# Patient Record
Sex: FEMALE | Race: WHITE | NOT HISPANIC OR LATINO | Employment: UNEMPLOYED | URBAN - METROPOLITAN AREA
[De-identification: names, ages, dates, MRNs, and addresses within clinical notes are randomized per-mention and may not be internally consistent; named-entity substitution may affect disease eponyms.]

---

## 2024-01-01 ENCOUNTER — APPOINTMENT (OUTPATIENT)
Dept: RADIOLOGY | Facility: HOSPITAL | Age: 44
DRG: 871 | End: 2024-01-01
Payer: COMMERCIAL

## 2024-01-01 ENCOUNTER — APPOINTMENT (OUTPATIENT)
Dept: PRIMARY CARE | Facility: CLINIC | Age: 44
End: 2024-01-01
Payer: COMMERCIAL

## 2024-01-01 ENCOUNTER — APPOINTMENT (OUTPATIENT)
Dept: CARDIOLOGY | Facility: HOSPITAL | Age: 44
DRG: 871 | End: 2024-01-01
Payer: COMMERCIAL

## 2024-01-01 ENCOUNTER — HOSPITAL ENCOUNTER (INPATIENT)
Facility: HOSPITAL | Age: 44
LOS: 4 days | DRG: 871 | End: 2024-04-13
Attending: EMERGENCY MEDICINE | Admitting: INTERNAL MEDICINE
Payer: COMMERCIAL

## 2024-01-01 VITALS
OXYGEN SATURATION: 97 % | WEIGHT: 170.42 LBS | DIASTOLIC BLOOD PRESSURE: 66 MMHG | HEIGHT: 65 IN | SYSTOLIC BLOOD PRESSURE: 129 MMHG | TEMPERATURE: 95.9 F | BODY MASS INDEX: 28.39 KG/M2

## 2024-01-01 DIAGNOSIS — E16.2 HYPOGLYCEMIA: Primary | ICD-10-CM

## 2024-01-01 DIAGNOSIS — K92.2 GASTROINTESTINAL HEMORRHAGE, UNSPECIFIED GASTROINTESTINAL HEMORRHAGE TYPE: ICD-10-CM

## 2024-01-01 DIAGNOSIS — J96.01 ACUTE HYPOXIC RESPIRATORY FAILURE (MULTI): ICD-10-CM

## 2024-01-01 DIAGNOSIS — K72.90 DECOMPENSATION OF CIRRHOSIS OF LIVER (MULTI): ICD-10-CM

## 2024-01-01 DIAGNOSIS — K74.60 DECOMPENSATION OF CIRRHOSIS OF LIVER (MULTI): ICD-10-CM

## 2024-01-01 DIAGNOSIS — E03.9 HYPOTHYROIDISM, UNSPECIFIED TYPE: ICD-10-CM

## 2024-01-01 DIAGNOSIS — N17.9 ACUTE KIDNEY INJURY (CMS-HCC): ICD-10-CM

## 2024-01-01 LAB
ABO GROUP (TYPE) IN BLOOD: NORMAL
ABO GROUP (TYPE) IN BLOOD: NORMAL
ACANTHOCYTES BLD QL SMEAR: ABNORMAL
ALBUMIN FLD-MCNC: <0.5 G/DL
ALBUMIN SERPL BCP-MCNC: 1.5 G/DL (ref 3.4–5)
ALBUMIN SERPL BCP-MCNC: 1.7 G/DL (ref 3.4–5)
ALBUMIN SERPL BCP-MCNC: 2.2 G/DL (ref 3.4–5)
ALBUMIN SERPL BCP-MCNC: 2.4 G/DL (ref 3.4–5)
ALBUMIN SERPL BCP-MCNC: 2.4 G/DL (ref 3.4–5)
ALBUMIN SERPL BCP-MCNC: 2.5 G/DL (ref 3.4–5)
ALBUMIN SERPL BCP-MCNC: 2.6 G/DL (ref 3.4–5)
ALBUMIN SERPL BCP-MCNC: 2.7 G/DL (ref 3.4–5)
ALBUMIN SERPL BCP-MCNC: 2.7 G/DL (ref 3.4–5)
ALBUMIN SERPL BCP-MCNC: 3.1 G/DL (ref 3.4–5)
ALP SERPL-CCNC: 124 U/L (ref 33–110)
ALP SERPL-CCNC: 130 U/L (ref 33–110)
ALP SERPL-CCNC: 72 U/L (ref 33–110)
ALP SERPL-CCNC: 81 U/L (ref 33–110)
ALP SERPL-CCNC: 87 U/L (ref 33–110)
ALT SERPL W P-5'-P-CCNC: 13 U/L (ref 7–45)
ALT SERPL W P-5'-P-CCNC: 14 U/L (ref 7–45)
ALT SERPL W P-5'-P-CCNC: 16 U/L (ref 7–45)
ALT SERPL W P-5'-P-CCNC: 19 U/L (ref 7–45)
ALT SERPL W P-5'-P-CCNC: 21 U/L (ref 7–45)
AMMONIA PLAS-SCNC: 115 UMOL/L (ref 16–53)
AMPHETAMINES UR QL SCN: ABNORMAL
ANION GAP BLDA CALCULATED.4IONS-SCNC: 14 MMO/L (ref 10–25)
ANION GAP BLDA CALCULATED.4IONS-SCNC: 14 MMO/L (ref 10–25)
ANION GAP BLDA CALCULATED.4IONS-SCNC: 15 MMO/L (ref 10–25)
ANION GAP BLDA CALCULATED.4IONS-SCNC: 16 MMO/L (ref 10–25)
ANION GAP BLDA CALCULATED.4IONS-SCNC: 16 MMO/L (ref 10–25)
ANION GAP BLDA CALCULATED.4IONS-SCNC: 17 MMO/L (ref 10–25)
ANION GAP BLDA CALCULATED.4IONS-SCNC: 17 MMO/L (ref 10–25)
ANION GAP BLDA CALCULATED.4IONS-SCNC: 18 MMO/L (ref 10–25)
ANION GAP BLDA CALCULATED.4IONS-SCNC: 18 MMO/L (ref 10–25)
ANION GAP BLDA CALCULATED.4IONS-SCNC: 19 MMO/L (ref 10–25)
ANION GAP BLDA CALCULATED.4IONS-SCNC: 19 MMO/L (ref 10–25)
ANION GAP BLDA CALCULATED.4IONS-SCNC: 20 MMO/L (ref 10–25)
ANION GAP BLDA CALCULATED.4IONS-SCNC: ABNORMAL MMOL/L
ANION GAP BLDV CALCULATED.4IONS-SCNC: 18 MMOL/L (ref 10–25)
ANION GAP BLDV CALCULATED.4IONS-SCNC: 19 MMOL/L (ref 10–25)
ANION GAP SERPL CALC-SCNC: 15 MMOL/L (ref 10–20)
ANION GAP SERPL CALC-SCNC: 15 MMOL/L (ref 10–20)
ANION GAP SERPL CALC-SCNC: 16 MMOL/L (ref 10–20)
ANION GAP SERPL CALC-SCNC: 17 MMOL/L (ref 10–20)
ANION GAP SERPL CALC-SCNC: 18 MMOL/L (ref 10–20)
ANION GAP SERPL CALC-SCNC: 20 MMOL/L (ref 10–20)
ANION GAP SERPL CALC-SCNC: 20 MMOL/L (ref 10–20)
ANTIBODY SCREEN: NORMAL
APAP SERPL-MCNC: <10 UG/ML
APPEARANCE UR: ABNORMAL
APTT PPP: 101 SECONDS (ref 27–38)
APTT PPP: 43 SECONDS (ref 27–38)
APTT PPP: 61 SECONDS (ref 27–38)
AST SERPL W P-5'-P-CCNC: 44 U/L (ref 9–39)
AST SERPL W P-5'-P-CCNC: 48 U/L (ref 9–39)
AST SERPL W P-5'-P-CCNC: 51 U/L (ref 9–39)
AST SERPL W P-5'-P-CCNC: 57 U/L (ref 9–39)
AST SERPL W P-5'-P-CCNC: 65 U/L (ref 9–39)
ATRIAL RATE: 104 BPM
ATRIAL RATE: 85 BPM
BACTERIA BLD CULT: NORMAL
BACTERIA BLD CULT: NORMAL
BACTERIA FLD CULT: NORMAL
BACTERIA SPEC RESP CULT: ABNORMAL
BACTERIA SPEC RESP CULT: ABNORMAL
BACTERIA UR CULT: NO GROWTH
BARBITURATES UR QL SCN: ABNORMAL
BASE EXCESS BLDA CALC-SCNC: -11.8 MMOL/L (ref -2–3)
BASE EXCESS BLDA CALC-SCNC: -13.7 MMOL/L (ref -2–3)
BASE EXCESS BLDA CALC-SCNC: -13.7 MMOL/L (ref -2–3)
BASE EXCESS BLDA CALC-SCNC: -13.9 MMOL/L (ref -2–3)
BASE EXCESS BLDA CALC-SCNC: -14 MMOL/L (ref -2–3)
BASE EXCESS BLDA CALC-SCNC: -14.2 MMOL/L (ref -2–3)
BASE EXCESS BLDA CALC-SCNC: -15.2 MMOL/L (ref -2–3)
BASE EXCESS BLDA CALC-SCNC: -16.4 MMOL/L (ref -2–3)
BASE EXCESS BLDA CALC-SCNC: -6.7 MMOL/L (ref -2–3)
BASE EXCESS BLDA CALC-SCNC: -7.6 MMOL/L (ref -2–3)
BASE EXCESS BLDA CALC-SCNC: -7.8 MMOL/L (ref -2–3)
BASE EXCESS BLDA CALC-SCNC: -8.4 MMOL/L (ref -2–3)
BASE EXCESS BLDA CALC-SCNC: -8.8 MMOL/L (ref -2–3)
BASE EXCESS BLDA CALC-SCNC: -9.6 MMOL/L (ref -2–3)
BASE EXCESS BLDV CALC-SCNC: -12.4 MMOL/L (ref -2–3)
BASE EXCESS BLDV CALC-SCNC: -13.9 MMOL/L (ref -2–3)
BASOPHILS # BLD AUTO: 0.01 X10*3/UL (ref 0–0.1)
BASOPHILS # BLD MANUAL: 0 X10*3/UL (ref 0–0.1)
BASOPHILS NFR BLD AUTO: 0.1 %
BASOPHILS NFR BLD MANUAL: 0 %
BASOPHILS NFR FLD MANUAL: 0 %
BENZODIAZ UR QL SCN: ABNORMAL
BILIRUB DIRECT SERPL-MCNC: 0.8 MG/DL (ref 0–0.3)
BILIRUB DIRECT SERPL-MCNC: 1.3 MG/DL (ref 0–0.3)
BILIRUB DIRECT SERPL-MCNC: 1.4 MG/DL (ref 0–0.3)
BILIRUB DIRECT SERPL-MCNC: 2.3 MG/DL (ref 0–0.3)
BILIRUB SERPL-MCNC: 1.4 MG/DL (ref 0–1.2)
BILIRUB SERPL-MCNC: 2.2 MG/DL (ref 0–1.2)
BILIRUB SERPL-MCNC: 2.4 MG/DL (ref 0–1.2)
BILIRUB SERPL-MCNC: 2.6 MG/DL (ref 0–1.2)
BILIRUB SERPL-MCNC: 3.6 MG/DL (ref 0–1.2)
BILIRUB UR STRIP.AUTO-MCNC: NEGATIVE MG/DL
BLASTS NFR FLD MANUAL: 0 %
BLOOD EXPIRATION DATE: NORMAL
BNP SERPL-MCNC: 476 PG/ML (ref 0–99)
BODY TEMPERATURE: 37 DEGREES CELSIUS
BUN SERPL-MCNC: 12 MG/DL (ref 6–23)
BUN SERPL-MCNC: 12 MG/DL (ref 6–23)
BUN SERPL-MCNC: 15 MG/DL (ref 6–23)
BUN SERPL-MCNC: 17 MG/DL (ref 6–23)
BUN SERPL-MCNC: 18 MG/DL (ref 6–23)
BUN SERPL-MCNC: 5 MG/DL (ref 6–23)
BUN SERPL-MCNC: 5 MG/DL (ref 6–23)
BUN SERPL-MCNC: 7 MG/DL (ref 6–23)
BUN SERPL-MCNC: 8 MG/DL (ref 6–23)
BUN SERPL-MCNC: 9 MG/DL (ref 6–23)
BUN SERPL-MCNC: 9 MG/DL (ref 6–23)
BURR CELLS BLD QL SMEAR: ABNORMAL
BURR CELLS BLD QL SMEAR: NORMAL
BZE UR QL SCN: ABNORMAL
CA-I BLD-SCNC: 0.96 MMOL/L (ref 1.1–1.33)
CA-I BLD-SCNC: 1 MMOL/L (ref 1.1–1.33)
CA-I BLD-SCNC: 1.05 MMOL/L (ref 1.1–1.33)
CA-I BLD-SCNC: 1.05 MMOL/L (ref 1.1–1.33)
CA-I BLDA-SCNC: 1.01 MMOL/L (ref 1.1–1.33)
CA-I BLDA-SCNC: 1.03 MMOL/L (ref 1.1–1.33)
CA-I BLDA-SCNC: 1.05 MMOL/L (ref 1.1–1.33)
CA-I BLDA-SCNC: 1.06 MMOL/L (ref 1.1–1.33)
CA-I BLDA-SCNC: 1.06 MMOL/L (ref 1.1–1.33)
CA-I BLDA-SCNC: 1.07 MMOL/L (ref 1.1–1.33)
CA-I BLDA-SCNC: 1.08 MMOL/L (ref 1.1–1.33)
CA-I BLDA-SCNC: 1.09 MMOL/L (ref 1.1–1.33)
CA-I BLDA-SCNC: 1.09 MMOL/L (ref 1.1–1.33)
CA-I BLDA-SCNC: 1.11 MMOL/L (ref 1.1–1.33)
CA-I BLDA-SCNC: 1.14 MMOL/L (ref 1.1–1.33)
CA-I BLDA-SCNC: 1.17 MMOL/L (ref 1.1–1.33)
CA-I BLDV-SCNC: 1.18 MMOL/L (ref 1.1–1.33)
CA-I BLDV-SCNC: 1.21 MMOL/L (ref 1.1–1.33)
CA-I DIAL FLD-SCNC: 1.02 MMOL/L
CALCIUM SERPL-MCNC: 7.2 MG/DL (ref 8.6–10.6)
CALCIUM SERPL-MCNC: 7.3 MG/DL (ref 8.6–10.6)
CALCIUM SERPL-MCNC: 7.3 MG/DL (ref 8.6–10.6)
CALCIUM SERPL-MCNC: 7.4 MG/DL (ref 8.6–10.6)
CALCIUM SERPL-MCNC: 7.5 MG/DL (ref 8.6–10.6)
CALCIUM SERPL-MCNC: 7.6 MG/DL (ref 8.6–10.6)
CALCIUM SERPL-MCNC: 7.8 MG/DL (ref 8.6–10.6)
CALCIUM SERPL-MCNC: 7.8 MG/DL (ref 8.6–10.6)
CALCIUM SERPL-MCNC: 8 MG/DL (ref 8.6–10.6)
CANNABINOIDS UR QL SCN: ABNORMAL
CAOX CRY #/AREA UR COMP ASSIST: ABNORMAL /HPF
CHLORIDE BLDA-SCNC: 100 MMOL/L (ref 98–107)
CHLORIDE BLDA-SCNC: 102 MMOL/L (ref 98–107)
CHLORIDE BLDA-SCNC: 97 MMOL/L (ref 98–107)
CHLORIDE BLDA-SCNC: 97 MMOL/L (ref 98–107)
CHLORIDE BLDA-SCNC: 98 MMOL/L (ref 98–107)
CHLORIDE BLDA-SCNC: 99 MMOL/L (ref 98–107)
CHLORIDE BLDA-SCNC: ABNORMAL MMOL/L
CHLORIDE BLDV-SCNC: 101 MMOL/L (ref 98–107)
CHLORIDE BLDV-SCNC: 101 MMOL/L (ref 98–107)
CHLORIDE SERPL-SCNC: 101 MMOL/L (ref 98–107)
CHLORIDE SERPL-SCNC: 96 MMOL/L (ref 98–107)
CHLORIDE SERPL-SCNC: 96 MMOL/L (ref 98–107)
CHLORIDE SERPL-SCNC: 97 MMOL/L (ref 98–107)
CHLORIDE SERPL-SCNC: 98 MMOL/L (ref 98–107)
CHLORIDE SERPL-SCNC: 98 MMOL/L (ref 98–107)
CHLORIDE SERPL-SCNC: 99 MMOL/L (ref 98–107)
CHLORIDE UR-SCNC: 21 MMOL/L
CHLORIDE/CREATININE (MMOL/G) IN URINE: 12 MMOL/G CREAT (ref 38–318)
CK SERPL-CCNC: 86 U/L (ref 0–215)
CLARITY FLD: ABNORMAL
CO2 SERPL-SCNC: 11 MMOL/L (ref 21–32)
CO2 SERPL-SCNC: 14 MMOL/L (ref 21–32)
CO2 SERPL-SCNC: 16 MMOL/L (ref 21–32)
CO2 SERPL-SCNC: 17 MMOL/L (ref 21–32)
CO2 SERPL-SCNC: 18 MMOL/L (ref 21–32)
CO2 SERPL-SCNC: 18 MMOL/L (ref 21–32)
CO2 SERPL-SCNC: 19 MMOL/L (ref 21–32)
CO2 SERPL-SCNC: 21 MMOL/L (ref 21–32)
CO2 SERPL-SCNC: 22 MMOL/L (ref 21–32)
COLOR FLD: ABNORMAL
COLOR UR: YELLOW
CREAT SERPL-MCNC: 1.28 MG/DL (ref 0.5–1.05)
CREAT SERPL-MCNC: 1.42 MG/DL (ref 0.5–1.05)
CREAT SERPL-MCNC: 1.64 MG/DL (ref 0.5–1.05)
CREAT SERPL-MCNC: 2.15 MG/DL (ref 0.5–1.05)
CREAT SERPL-MCNC: 2.23 MG/DL (ref 0.5–1.05)
CREAT SERPL-MCNC: 2.26 MG/DL (ref 0.5–1.05)
CREAT SERPL-MCNC: 2.8 MG/DL (ref 0.5–1.05)
CREAT SERPL-MCNC: 2.87 MG/DL (ref 0.5–1.05)
CREAT SERPL-MCNC: 3.62 MG/DL (ref 0.5–1.05)
CREAT SERPL-MCNC: 4.61 MG/DL (ref 0.5–1.05)
CREAT SERPL-MCNC: 4.8 MG/DL (ref 0.5–1.05)
CREAT UR-MCNC: 178.7 MG/DL (ref 20–320)
CREAT UR-MCNC: 178.7 MG/DL (ref 20–320)
DACRYOCYTES BLD QL SMEAR: ABNORMAL
DACRYOCYTES BLD QL SMEAR: ABNORMAL
DISPENSE STATUS: NORMAL
EGFRCR SERPLBLD CKD-EPI 2021: 11 ML/MIN/1.73M*2
EGFRCR SERPLBLD CKD-EPI 2021: 11 ML/MIN/1.73M*2
EGFRCR SERPLBLD CKD-EPI 2021: 15 ML/MIN/1.73M*2
EGFRCR SERPLBLD CKD-EPI 2021: 20 ML/MIN/1.73M*2
EGFRCR SERPLBLD CKD-EPI 2021: 21 ML/MIN/1.73M*2
EGFRCR SERPLBLD CKD-EPI 2021: 27 ML/MIN/1.73M*2
EGFRCR SERPLBLD CKD-EPI 2021: 27 ML/MIN/1.73M*2
EGFRCR SERPLBLD CKD-EPI 2021: 28 ML/MIN/1.73M*2
EGFRCR SERPLBLD CKD-EPI 2021: 39 ML/MIN/1.73M*2
EGFRCR SERPLBLD CKD-EPI 2021: 47 ML/MIN/1.73M*2
EGFRCR SERPLBLD CKD-EPI 2021: 53 ML/MIN/1.73M*2
EOSINOPHIL # BLD AUTO: 0.03 X10*3/UL (ref 0–0.7)
EOSINOPHIL # BLD MANUAL: 0 X10*3/UL (ref 0–0.7)
EOSINOPHIL # BLD MANUAL: 0.22 X10*3/UL (ref 0–0.7)
EOSINOPHIL NFR BLD AUTO: 0.3 %
EOSINOPHIL NFR BLD MANUAL: 0 %
EOSINOPHIL NFR BLD MANUAL: 1 %
EOSINOPHIL NFR FLD MANUAL: 0 %
ERYTHROCYTE [DISTWIDTH] IN BLOOD BY AUTOMATED COUNT: 18.7 % (ref 11.5–14.5)
ERYTHROCYTE [DISTWIDTH] IN BLOOD BY AUTOMATED COUNT: 18.8 % (ref 11.5–14.5)
ERYTHROCYTE [DISTWIDTH] IN BLOOD BY AUTOMATED COUNT: 19.1 % (ref 11.5–14.5)
ERYTHROCYTE [DISTWIDTH] IN BLOOD BY AUTOMATED COUNT: 19.2 % (ref 11.5–14.5)
ERYTHROCYTE [DISTWIDTH] IN BLOOD BY AUTOMATED COUNT: 19.5 % (ref 11.5–14.5)
ERYTHROCYTE [DISTWIDTH] IN BLOOD BY AUTOMATED COUNT: 19.5 % (ref 11.5–14.5)
ERYTHROCYTE [DISTWIDTH] IN BLOOD BY AUTOMATED COUNT: 19.9 % (ref 11.5–14.5)
ERYTHROCYTE [DISTWIDTH] IN BLOOD BY AUTOMATED COUNT: 20.2 % (ref 11.5–14.5)
ERYTHROCYTE [DISTWIDTH] IN BLOOD BY AUTOMATED COUNT: 20.9 % (ref 11.5–14.5)
ERYTHROCYTE [DISTWIDTH] IN BLOOD BY AUTOMATED COUNT: 21.1 % (ref 11.5–14.5)
ERYTHROCYTE [DISTWIDTH] IN BLOOD BY AUTOMATED COUNT: 21.1 % (ref 11.5–14.5)
ERYTHROCYTE [DISTWIDTH] IN BLOOD BY AUTOMATED COUNT: 22.3 % (ref 11.5–14.5)
EST. AVERAGE GLUCOSE BLD GHB EST-MCNC: 91 MG/DL
ETHANOL SERPL-MCNC: <10 MG/DL
FACT VIII ACT/NOR PPP: 178 % (ref 55–180)
FENTANYL+NORFENTANYL UR QL SCN: ABNORMAL
FERRITIN SERPL-MCNC: 2016 NG/ML (ref 8–150)
FIBRINOGEN PPP-MCNC: 55 MG/DL (ref 200–400)
FIBRINOGEN PPP-MCNC: 56 MG/DL (ref 200–400)
FIBRINOGEN PPP-MCNC: 67 MG/DL (ref 200–400)
FOLATE SERPL-MCNC: 15.7 NG/ML
GLUCOSE BLD MANUAL STRIP-MCNC: 126 MG/DL (ref 74–99)
GLUCOSE BLD MANUAL STRIP-MCNC: 141 MG/DL (ref 74–99)
GLUCOSE BLD MANUAL STRIP-MCNC: 155 MG/DL (ref 74–99)
GLUCOSE BLD MANUAL STRIP-MCNC: 157 MG/DL (ref 74–99)
GLUCOSE BLD MANUAL STRIP-MCNC: 159 MG/DL (ref 74–99)
GLUCOSE BLD MANUAL STRIP-MCNC: 164 MG/DL (ref 74–99)
GLUCOSE BLD MANUAL STRIP-MCNC: 164 MG/DL (ref 74–99)
GLUCOSE BLD MANUAL STRIP-MCNC: 165 MG/DL (ref 74–99)
GLUCOSE BLD MANUAL STRIP-MCNC: 166 MG/DL (ref 74–99)
GLUCOSE BLD MANUAL STRIP-MCNC: 167 MG/DL (ref 74–99)
GLUCOSE BLD MANUAL STRIP-MCNC: 170 MG/DL (ref 74–99)
GLUCOSE BLD MANUAL STRIP-MCNC: 182 MG/DL (ref 74–99)
GLUCOSE BLD MANUAL STRIP-MCNC: 186 MG/DL (ref 74–99)
GLUCOSE BLD MANUAL STRIP-MCNC: 189 MG/DL (ref 74–99)
GLUCOSE BLD MANUAL STRIP-MCNC: 193 MG/DL (ref 74–99)
GLUCOSE BLD MANUAL STRIP-MCNC: 193 MG/DL (ref 74–99)
GLUCOSE BLD MANUAL STRIP-MCNC: 195 MG/DL (ref 74–99)
GLUCOSE BLD MANUAL STRIP-MCNC: 210 MG/DL (ref 74–99)
GLUCOSE BLD MANUAL STRIP-MCNC: 215 MG/DL (ref 74–99)
GLUCOSE BLD MANUAL STRIP-MCNC: 222 MG/DL (ref 74–99)
GLUCOSE BLD MANUAL STRIP-MCNC: 229 MG/DL (ref 74–99)
GLUCOSE BLD MANUAL STRIP-MCNC: 253 MG/DL (ref 74–99)
GLUCOSE BLD MANUAL STRIP-MCNC: 258 MG/DL (ref 74–99)
GLUCOSE BLD MANUAL STRIP-MCNC: 261 MG/DL (ref 74–99)
GLUCOSE BLD MANUAL STRIP-MCNC: 269 MG/DL (ref 74–99)
GLUCOSE BLD MANUAL STRIP-MCNC: 293 MG/DL (ref 74–99)
GLUCOSE BLD MANUAL STRIP-MCNC: 30 MG/DL (ref 74–99)
GLUCOSE BLD MANUAL STRIP-MCNC: 303 MG/DL (ref 74–99)
GLUCOSE BLD MANUAL STRIP-MCNC: 303 MG/DL (ref 74–99)
GLUCOSE BLD MANUAL STRIP-MCNC: 333 MG/DL (ref 74–99)
GLUCOSE BLD MANUAL STRIP-MCNC: 86 MG/DL (ref 74–99)
GLUCOSE BLDA-MCNC: 112 MG/DL (ref 74–99)
GLUCOSE BLDA-MCNC: 137 MG/DL (ref 74–99)
GLUCOSE BLDA-MCNC: 149 MG/DL (ref 74–99)
GLUCOSE BLDA-MCNC: 149 MG/DL (ref 74–99)
GLUCOSE BLDA-MCNC: 153 MG/DL (ref 74–99)
GLUCOSE BLDA-MCNC: 162 MG/DL (ref 74–99)
GLUCOSE BLDA-MCNC: 174 MG/DL (ref 74–99)
GLUCOSE BLDA-MCNC: 195 MG/DL (ref 74–99)
GLUCOSE BLDA-MCNC: 196 MG/DL (ref 74–99)
GLUCOSE BLDA-MCNC: 239 MG/DL (ref 74–99)
GLUCOSE BLDA-MCNC: 250 MG/DL (ref 74–99)
GLUCOSE BLDA-MCNC: 268 MG/DL (ref 74–99)
GLUCOSE BLDA-MCNC: 271 MG/DL (ref 74–99)
GLUCOSE BLDA-MCNC: 286 MG/DL (ref 74–99)
GLUCOSE BLDA-MCNC: 289 MG/DL (ref 74–99)
GLUCOSE BLDA-MCNC: 296 MG/DL (ref 74–99)
GLUCOSE BLDV-MCNC: 170 MG/DL (ref 74–99)
GLUCOSE BLDV-MCNC: 40 MG/DL (ref 74–99)
GLUCOSE FLD-MCNC: 125 MG/DL
GLUCOSE SERPL-MCNC: 106 MG/DL (ref 74–99)
GLUCOSE SERPL-MCNC: 108 MG/DL (ref 74–99)
GLUCOSE SERPL-MCNC: 158 MG/DL (ref 74–99)
GLUCOSE SERPL-MCNC: 163 MG/DL (ref 74–99)
GLUCOSE SERPL-MCNC: 175 MG/DL (ref 74–99)
GLUCOSE SERPL-MCNC: 194 MG/DL (ref 74–99)
GLUCOSE SERPL-MCNC: 238 MG/DL (ref 74–99)
GLUCOSE SERPL-MCNC: 240 MG/DL (ref 74–99)
GLUCOSE SERPL-MCNC: 39 MG/DL (ref 74–99)
GLUCOSE UR STRIP.AUTO-MCNC: NORMAL MG/DL
GRAM STN SPEC: ABNORMAL
GRAM STN SPEC: NORMAL
GRAM STN SPEC: NORMAL
HAPTOGLOB SERPL-MCNC: <10 MG/DL (ref 37–246)
HBA1C MFR BLD: 4.8 %
HCO3 BLDA-SCNC: 10.2 MMOL/L (ref 22–26)
HCO3 BLDA-SCNC: 10.9 MMOL/L (ref 22–26)
HCO3 BLDA-SCNC: 12.1 MMOL/L (ref 22–26)
HCO3 BLDA-SCNC: 12.4 MMOL/L (ref 22–26)
HCO3 BLDA-SCNC: 12.4 MMOL/L (ref 22–26)
HCO3 BLDA-SCNC: 12.8 MMOL/L (ref 22–26)
HCO3 BLDA-SCNC: 12.9 MMOL/L (ref 22–26)
HCO3 BLDA-SCNC: 14.7 MMOL/L (ref 22–26)
HCO3 BLDA-SCNC: 15.5 MMOL/L (ref 22–26)
HCO3 BLDA-SCNC: 16.5 MMOL/L (ref 22–26)
HCO3 BLDA-SCNC: 16.8 MMOL/L (ref 22–26)
HCO3 BLDA-SCNC: 17.4 MMOL/L (ref 22–26)
HCO3 BLDA-SCNC: 17.4 MMOL/L (ref 22–26)
HCO3 BLDA-SCNC: 18.1 MMOL/L (ref 22–26)
HCO3 BLDA-SCNC: 19.5 MMOL/L (ref 22–26)
HCO3 BLDA-SCNC: 19.9 MMOL/L (ref 22–26)
HCO3 BLDV-SCNC: 12.8 MMOL/L (ref 22–26)
HCO3 BLDV-SCNC: 12.8 MMOL/L (ref 22–26)
HCT VFR BLD AUTO: 17.2 % (ref 36–46)
HCT VFR BLD AUTO: 18.7 % (ref 36–46)
HCT VFR BLD AUTO: 19.1 % (ref 36–46)
HCT VFR BLD AUTO: 19.7 % (ref 36–46)
HCT VFR BLD AUTO: 20 % (ref 36–46)
HCT VFR BLD AUTO: 20.2 % (ref 36–46)
HCT VFR BLD AUTO: 21.4 % (ref 36–46)
HCT VFR BLD AUTO: 22 % (ref 36–46)
HCT VFR BLD AUTO: 23.2 % (ref 36–46)
HCT VFR BLD AUTO: 23.5 % (ref 36–46)
HCT VFR BLD AUTO: 23.5 % (ref 36–46)
HCT VFR BLD AUTO: 25.4 % (ref 36–46)
HCT VFR BLD EST: 20 % (ref 36–46)
HCT VFR BLD EST: 21 % (ref 36–46)
HCT VFR BLD EST: 22 % (ref 36–46)
HCT VFR BLD EST: 23 % (ref 36–46)
HCT VFR BLD EST: 24 % (ref 36–46)
HCT VFR BLD EST: 26 % (ref 36–46)
HCT VFR BLD EST: 28 % (ref 36–46)
HCT VFR BLD EST: 28 % (ref 36–46)
HCT VFR BLD EST: 31 % (ref 36–46)
HGB BLD-MCNC: 6.3 G/DL (ref 12–16)
HGB BLD-MCNC: 6.7 G/DL (ref 12–16)
HGB BLD-MCNC: 6.8 G/DL (ref 12–16)
HGB BLD-MCNC: 7.1 G/DL (ref 12–16)
HGB BLD-MCNC: 7.2 G/DL (ref 12–16)
HGB BLD-MCNC: 7.5 G/DL (ref 12–16)
HGB BLD-MCNC: 7.6 G/DL (ref 12–16)
HGB BLD-MCNC: 7.6 G/DL (ref 12–16)
HGB BLD-MCNC: 7.7 G/DL (ref 12–16)
HGB BLD-MCNC: 7.8 G/DL (ref 12–16)
HGB BLD-MCNC: 8.4 G/DL (ref 12–16)
HGB BLD-MCNC: 9.3 G/DL (ref 12–16)
HGB BLDA-MCNC: 10.2 G/DL (ref 12–16)
HGB BLDA-MCNC: 7 G/DL (ref 12–16)
HGB BLDA-MCNC: 7.1 G/DL (ref 12–16)
HGB BLDA-MCNC: 7.4 G/DL (ref 12–16)
HGB BLDA-MCNC: 7.5 G/DL (ref 12–16)
HGB BLDA-MCNC: 7.6 G/DL (ref 12–16)
HGB BLDA-MCNC: 7.6 G/DL (ref 12–16)
HGB BLDA-MCNC: 7.7 G/DL (ref 12–16)
HGB BLDA-MCNC: 8.1 G/DL (ref 12–16)
HGB BLDA-MCNC: 8.8 G/DL (ref 12–16)
HGB BLDA-MCNC: 9.2 G/DL (ref 12–16)
HGB BLDA-MCNC: 9.2 G/DL (ref 12–16)
HGB BLDV-MCNC: 6.6 G/DL (ref 12–16)
HGB BLDV-MCNC: 7 G/DL (ref 12–16)
HOLD SPECIMEN: NORMAL
HYALINE CASTS #/AREA URNS AUTO: ABNORMAL /LPF
HYPOCHROMIA BLD QL SMEAR: ABNORMAL
IMM GRANULOCYTES # BLD AUTO: 0.05 X10*3/UL (ref 0–0.7)
IMM GRANULOCYTES # BLD AUTO: 0.08 X10*3/UL (ref 0–0.7)
IMM GRANULOCYTES # BLD AUTO: 0.13 X10*3/UL (ref 0–0.7)
IMM GRANULOCYTES # BLD AUTO: 0.16 X10*3/UL (ref 0–0.7)
IMM GRANULOCYTES # BLD AUTO: 0.16 X10*3/UL (ref 0–0.7)
IMM GRANULOCYTES # BLD AUTO: 0.17 X10*3/UL (ref 0–0.7)
IMM GRANULOCYTES # BLD AUTO: 0.21 X10*3/UL (ref 0–0.7)
IMM GRANULOCYTES NFR BLD AUTO: 0.3 % (ref 0–0.9)
IMM GRANULOCYTES NFR BLD AUTO: 0.6 % (ref 0–0.9)
IMM GRANULOCYTES NFR BLD AUTO: 0.7 % (ref 0–0.9)
IMM GRANULOCYTES NFR BLD AUTO: 0.7 % (ref 0–0.9)
IMM GRANULOCYTES NFR BLD AUTO: 0.8 % (ref 0–0.9)
IMM GRANULOCYTES NFR BLD AUTO: 0.9 % (ref 0–0.9)
IMM GRANULOCYTES NFR BLD AUTO: 1.3 % (ref 0–0.9)
IMMATURE GRANULOCYTES IN FLUID: 0 %
INHALED O2 CONCENTRATION: 100 %
INHALED O2 CONCENTRATION: 21 %
INHALED O2 CONCENTRATION: 60 %
INHALED O2 CONCENTRATION: 80 %
INR PPP: 1.7 (ref 0.9–1.1)
INR PPP: 1.9 (ref 0.9–1.1)
INR PPP: 2 (ref 0.9–1.1)
INR PPP: 3.3 (ref 0.9–1.1)
IRON SATN MFR SERPL: ABNORMAL %
IRON SERPL-MCNC: 58 UG/DL (ref 35–150)
KETONES UR STRIP.AUTO-MCNC: NEGATIVE MG/DL
LACTATE BLDA-SCNC: 2.2 MMOL/L (ref 0.4–2)
LACTATE BLDA-SCNC: 3.5 MMOL/L (ref 0.4–2)
LACTATE BLDA-SCNC: 4 MMOL/L (ref 0.4–2)
LACTATE BLDA-SCNC: 4.2 MMOL/L (ref 0.4–2)
LACTATE BLDA-SCNC: 4.2 MMOL/L (ref 0.4–2)
LACTATE BLDA-SCNC: 4.3 MMOL/L (ref 0.4–2)
LACTATE BLDA-SCNC: 4.3 MMOL/L (ref 0.4–2)
LACTATE BLDA-SCNC: 4.4 MMOL/L (ref 0.4–2)
LACTATE BLDA-SCNC: 4.4 MMOL/L (ref 0.4–2)
LACTATE BLDA-SCNC: 4.6 MMOL/L (ref 0.4–2)
LACTATE BLDA-SCNC: 4.8 MMOL/L (ref 0.4–2)
LACTATE BLDA-SCNC: 5.5 MMOL/L (ref 0.4–2)
LACTATE BLDA-SCNC: 5.6 MMOL/L (ref 0.4–2)
LACTATE BLDA-SCNC: 5.6 MMOL/L (ref 0.4–2)
LACTATE BLDA-SCNC: 6.4 MMOL/L (ref 0.4–2)
LACTATE BLDA-SCNC: 6.7 MMOL/L (ref 0.4–2)
LACTATE BLDV-SCNC: 1.8 MMOL/L (ref 0.4–2)
LACTATE BLDV-SCNC: 2 MMOL/L (ref 0.4–2)
LACTATE SERPL-SCNC: 2 MMOL/L (ref 0.4–2)
LACTATE SERPL-SCNC: 5.5 MMOL/L (ref 0.4–2)
LACTATE SERPL-SCNC: 5.5 MMOL/L (ref 0.4–2)
LDH SERPL L TO P-CCNC: 387 U/L (ref 84–246)
LEGIONELLA AG UR QL: NEGATIVE
LEUKOCYTE ESTERASE UR QL STRIP.AUTO: ABNORMAL
LIPASE SERPL-CCNC: 18 U/L (ref 9–82)
LYMPHOCYTES # BLD AUTO: 1.38 X10*3/UL (ref 1.2–4.8)
LYMPHOCYTES # BLD MANUAL: 0 X10*3/UL (ref 1.2–4.8)
LYMPHOCYTES # BLD MANUAL: 0.21 X10*3/UL (ref 1.2–4.8)
LYMPHOCYTES # BLD MANUAL: 0.41 X10*3/UL (ref 1.2–4.8)
LYMPHOCYTES # BLD MANUAL: 0.63 X10*3/UL (ref 1.2–4.8)
LYMPHOCYTES # BLD MANUAL: 2.75 X10*3/UL (ref 1.2–4.8)
LYMPHOCYTES # BLD MANUAL: 3.23 X10*3/UL (ref 1.2–4.8)
LYMPHOCYTES NFR BLD AUTO: 12.8 %
LYMPHOCYTES NFR BLD MANUAL: 0 %
LYMPHOCYTES NFR BLD MANUAL: 1.6 %
LYMPHOCYTES NFR BLD MANUAL: 1.6 %
LYMPHOCYTES NFR BLD MANUAL: 15 %
LYMPHOCYTES NFR BLD MANUAL: 17 %
LYMPHOCYTES NFR BLD MANUAL: 4.2 %
LYMPHOCYTES NFR FLD MANUAL: 7 %
MAGNESIUM SERPL-MCNC: 1.8 MG/DL (ref 1.6–2.4)
MAGNESIUM SERPL-MCNC: 1.82 MG/DL (ref 1.6–2.4)
MAGNESIUM SERPL-MCNC: 1.85 MG/DL (ref 1.6–2.4)
MAGNESIUM SERPL-MCNC: 2.01 MG/DL (ref 1.6–2.4)
MAGNESIUM SERPL-MCNC: 2.29 MG/DL (ref 1.6–2.4)
MAGNESIUM SERPL-MCNC: 2.32 MG/DL (ref 1.6–2.4)
MAGNESIUM SERPL-MCNC: 2.38 MG/DL (ref 1.6–2.4)
MAGNESIUM SERPL-MCNC: 2.4 MG/DL (ref 1.6–2.4)
MAGNESIUM SERPL-MCNC: 2.47 MG/DL (ref 1.6–2.4)
MCH RBC QN AUTO: 29.2 PG (ref 26–34)
MCH RBC QN AUTO: 30.2 PG (ref 26–34)
MCH RBC QN AUTO: 31 PG (ref 26–34)
MCH RBC QN AUTO: 31 PG (ref 26–34)
MCH RBC QN AUTO: 31.4 PG (ref 26–34)
MCH RBC QN AUTO: 31.5 PG (ref 26–34)
MCH RBC QN AUTO: 32.1 PG (ref 26–34)
MCH RBC QN AUTO: 32.1 PG (ref 26–34)
MCH RBC QN AUTO: 32.3 PG (ref 26–34)
MCH RBC QN AUTO: 33 PG (ref 26–34)
MCHC RBC AUTO-ENTMCNC: 32.3 G/DL (ref 32–36)
MCHC RBC AUTO-ENTMCNC: 32.3 G/DL (ref 32–36)
MCHC RBC AUTO-ENTMCNC: 35 G/DL (ref 32–36)
MCHC RBC AUTO-ENTMCNC: 35.6 G/DL (ref 32–36)
MCHC RBC AUTO-ENTMCNC: 35.8 G/DL (ref 32–36)
MCHC RBC AUTO-ENTMCNC: 36 G/DL (ref 32–36)
MCHC RBC AUTO-ENTMCNC: 36 G/DL (ref 32–36)
MCHC RBC AUTO-ENTMCNC: 36.2 G/DL (ref 32–36)
MCHC RBC AUTO-ENTMCNC: 36.4 G/DL (ref 32–36)
MCHC RBC AUTO-ENTMCNC: 36.6 G/DL (ref 32–36)
MCHC RBC AUTO-ENTMCNC: 36.6 G/DL (ref 32–36)
MCHC RBC AUTO-ENTMCNC: 37.1 G/DL (ref 32–36)
MCV RBC AUTO: 83 FL (ref 80–100)
MCV RBC AUTO: 84 FL (ref 80–100)
MCV RBC AUTO: 86 FL (ref 80–100)
MCV RBC AUTO: 86 FL (ref 80–100)
MCV RBC AUTO: 87 FL (ref 80–100)
MCV RBC AUTO: 88 FL (ref 80–100)
MCV RBC AUTO: 90 FL (ref 80–100)
MCV RBC AUTO: 90 FL (ref 80–100)
MCV RBC AUTO: 98 FL (ref 80–100)
MCV RBC AUTO: 98 FL (ref 80–100)
METAMYELOCYTES # BLD MANUAL: 0.43 X10*3/UL
METAMYELOCYTES # BLD MANUAL: 0.97 X10*3/UL
METAMYELOCYTES NFR BLD MANUAL: 2 %
METAMYELOCYTES NFR BLD MANUAL: 6 %
METHADONE UR QL SCN: ABNORMAL
MONOCYTES # BLD AUTO: 0.98 X10*3/UL (ref 0.1–1)
MONOCYTES # BLD MANUAL: 0.2 X10*3/UL (ref 0.1–1)
MONOCYTES # BLD MANUAL: 0.63 X10*3/UL (ref 0.1–1)
MONOCYTES # BLD MANUAL: 0.72 X10*3/UL (ref 0.1–1)
MONOCYTES # BLD MANUAL: 0.86 X10*3/UL (ref 0.1–1)
MONOCYTES # BLD MANUAL: 1.78 X10*3/UL (ref 0.1–1)
MONOCYTES # BLD MANUAL: 11.18 X10*3/UL (ref 0.1–1)
MONOCYTES NFR BLD AUTO: 9.1 %
MONOCYTES NFR BLD MANUAL: 0.8 %
MONOCYTES NFR BLD MANUAL: 11 %
MONOCYTES NFR BLD MANUAL: 2.5 %
MONOCYTES NFR BLD MANUAL: 4.2 %
MONOCYTES NFR BLD MANUAL: 52 %
MONOCYTES NFR BLD MANUAL: 6.6 %
MONOS+MACROS NFR FLD MANUAL: 17 %
MRSA DNA SPEC QL NAA+PROBE: DETECTED
MUCOUS THREADS #/AREA URNS AUTO: ABNORMAL /LPF
MYELOCYTES # BLD MANUAL: 0.32 X10*3/UL
MYELOCYTES # BLD MANUAL: 0.79 X10*3/UL
MYELOCYTES NFR BLD MANUAL: 2 %
MYELOCYTES NFR BLD MANUAL: 3.1 %
NEUTROPHILS # BLD AUTO: 8.26 X10*3/UL (ref 1.2–7.7)
NEUTROPHILS # BLD MANUAL: 10.36 X10*3/UL (ref 1.2–7.7)
NEUTROPHILS # BLD MANUAL: 24.19 X10*3/UL (ref 1.2–7.7)
NEUTROPHILS NFR BLD AUTO: 77 %
NEUTROPHILS NFR FLD MANUAL: 76 %
NEUTS BAND # BLD MANUAL: 5.25 X10*3/UL (ref 0–0.7)
NEUTS BAND # BLD MANUAL: 7.61 X10*3/UL (ref 0–0.7)
NEUTS BAND NFR BLD MANUAL: 20.5 %
NEUTS BAND NFR BLD MANUAL: 47 %
NEUTS SEG # BLD MANUAL: 11.93 X10*3/UL (ref 1.2–7)
NEUTS SEG # BLD MANUAL: 13.74 X10*3/UL (ref 1.2–7)
NEUTS SEG # BLD MANUAL: 18.94 X10*3/UL (ref 1.2–7)
NEUTS SEG # BLD MANUAL: 2.75 X10*3/UL (ref 1.2–7)
NEUTS SEG # BLD MANUAL: 27.89 X10*3/UL (ref 1.2–7)
NEUTS SEG # BLD MANUAL: 6.45 X10*3/UL (ref 1.2–7)
NEUTS SEG NFR BLD MANUAL: 17 %
NEUTS SEG NFR BLD MANUAL: 30 %
NEUTS SEG NFR BLD MANUAL: 74 %
NEUTS SEG NFR BLD MANUAL: 91.6 %
NEUTS SEG NFR BLD MANUAL: 91.8 %
NEUTS SEG NFR BLD MANUAL: 97.5 %
NITRITE UR QL STRIP.AUTO: NEGATIVE
NRBC BLD-RTO: 0 /100 WBCS (ref 0–0)
NRBC BLD-RTO: 0.1 /100 WBCS (ref 0–0)
NRBC BLD-RTO: 0.2 /100 WBCS (ref 0–0)
NRBC BLD-RTO: 0.3 /100 WBCS (ref 0–0)
OPIATES UR QL SCN: ABNORMAL
OTHER CELLS NFR FLD MANUAL: 0 %
OXYCODONE+OXYMORPHONE UR QL SCN: ABNORMAL
OXYHGB MFR BLDA: 80.1 % (ref 94–98)
OXYHGB MFR BLDA: 87.9 % (ref 94–98)
OXYHGB MFR BLDA: 90.3 % (ref 94–98)
OXYHGB MFR BLDA: 90.3 % (ref 94–98)
OXYHGB MFR BLDA: 90.8 % (ref 94–98)
OXYHGB MFR BLDA: 93.3 % (ref 94–98)
OXYHGB MFR BLDA: 94.4 % (ref 94–98)
OXYHGB MFR BLDA: 94.6 % (ref 94–98)
OXYHGB MFR BLDA: 96 % (ref 94–98)
OXYHGB MFR BLDA: 96.4 % (ref 94–98)
OXYHGB MFR BLDA: 97.2 % (ref 94–98)
OXYHGB MFR BLDA: 97.5 % (ref 94–98)
OXYHGB MFR BLDA: 97.8 % (ref 94–98)
OXYHGB MFR BLDA: 97.9 % (ref 94–98)
OXYHGB MFR BLDA: 98.2 % (ref 94–98)
OXYHGB MFR BLDA: 98.8 % (ref 94–98)
OXYHGB MFR BLDV: 68.5 % (ref 45–75)
OXYHGB MFR BLDV: 85.2 % (ref 45–75)
P AXIS: 52 DEGREES
P AXIS: 66 DEGREES
P OFFSET: 205 MS
P OFFSET: 209 MS
P ONSET: 159 MS
P ONSET: 161 MS
PATH REVIEW-CBC DIFFERENTIAL: NORMAL
PATH REVIEW-CELL CT,FLUID: NORMAL
PCO2 BLDA: 26 MM HG (ref 38–42)
PCO2 BLDA: 26 MM HG (ref 38–42)
PCO2 BLDA: 27 MM HG (ref 38–42)
PCO2 BLDA: 29 MM HG (ref 38–42)
PCO2 BLDA: 29 MM HG (ref 38–42)
PCO2 BLDA: 30 MM HG (ref 38–42)
PCO2 BLDA: 31 MM HG (ref 38–42)
PCO2 BLDA: 32 MM HG (ref 38–42)
PCO2 BLDA: 33 MM HG (ref 38–42)
PCO2 BLDA: 35 MM HG (ref 38–42)
PCO2 BLDA: 50 MM HG (ref 38–42)
PCO2 BLDA: 52 MM HG (ref 38–42)
PCO2 BLDV: 26 MM HG (ref 41–51)
PCO2 BLDV: 32 MM HG (ref 41–51)
PCP UR QL SCN: ABNORMAL
PH BLDA: 7.19 PH (ref 7.38–7.42)
PH BLDA: 7.2 PH (ref 7.38–7.42)
PH BLDA: 7.21 PH (ref 7.38–7.42)
PH BLDA: 7.21 PH (ref 7.38–7.42)
PH BLDA: 7.23 PH (ref 7.38–7.42)
PH BLDA: 7.23 PH (ref 7.38–7.42)
PH BLDA: 7.24 PH (ref 7.38–7.42)
PH BLDA: 7.26 PH (ref 7.38–7.42)
PH BLDA: 7.32 PH (ref 7.38–7.42)
PH BLDA: 7.32 PH (ref 7.38–7.42)
PH BLDA: 7.33 PH (ref 7.38–7.42)
PH BLDA: 7.33 PH (ref 7.38–7.42)
PH BLDA: 7.36 PH (ref 7.38–7.42)
PH BLDA: 7.37 PH (ref 7.38–7.42)
PH BLDV: 7.21 PH (ref 7.33–7.43)
PH BLDV: 7.3 PH (ref 7.33–7.43)
PH UR STRIP.AUTO: 5.5 [PH]
PHOSPHATE SERPL-MCNC: 2.7 MG/DL (ref 2.5–4.9)
PHOSPHATE SERPL-MCNC: 2.8 MG/DL (ref 2.5–4.9)
PHOSPHATE SERPL-MCNC: 2.9 MG/DL (ref 2.5–4.9)
PHOSPHATE SERPL-MCNC: 3 MG/DL (ref 2.5–4.9)
PHOSPHATE SERPL-MCNC: 3.2 MG/DL (ref 2.5–4.9)
PHOSPHATE SERPL-MCNC: 3.3 MG/DL (ref 2.5–4.9)
PHOSPHATE SERPL-MCNC: 3.3 MG/DL (ref 2.5–4.9)
PHOSPHATE SERPL-MCNC: 3.4 MG/DL (ref 2.5–4.9)
PHOSPHATE SERPL-MCNC: 5.1 MG/DL (ref 2.5–4.9)
PHOSPHATE SERPL-MCNC: 6.8 MG/DL (ref 2.5–4.9)
PHOSPHATE SERPL-MCNC: 7.2 MG/DL (ref 2.5–4.9)
PLASMA CELLS NFR FLD MANUAL: 0 %
PLATELET # BLD AUTO: 20 X10*3/UL (ref 150–450)
PLATELET # BLD AUTO: 28 X10*3/UL (ref 150–450)
PLATELET # BLD AUTO: 37 X10*3/UL (ref 150–450)
PLATELET # BLD AUTO: 47 X10*3/UL (ref 150–450)
PLATELET # BLD AUTO: 59 X10*3/UL (ref 150–450)
PLATELET # BLD AUTO: 71 X10*3/UL (ref 150–450)
PLATELET # BLD AUTO: 74 X10*3/UL (ref 150–450)
PLATELET # BLD AUTO: 77 X10*3/UL (ref 150–450)
PLATELET # BLD AUTO: 77 X10*3/UL (ref 150–450)
PLATELET # BLD AUTO: 89 X10*3/UL (ref 150–450)
PLATELET # BLD AUTO: 90 X10*3/UL (ref 150–450)
PLATELET # BLD AUTO: 90 X10*3/UL (ref 150–450)
PO2 BLDA: 100 MM HG (ref 85–95)
PO2 BLDA: 118 MM HG (ref 85–95)
PO2 BLDA: 128 MM HG (ref 85–95)
PO2 BLDA: 134 MM HG (ref 85–95)
PO2 BLDA: 189 MM HG (ref 85–95)
PO2 BLDA: 54 MM HG (ref 85–95)
PO2 BLDA: 63 MM HG (ref 85–95)
PO2 BLDA: 66 MM HG (ref 85–95)
PO2 BLDA: 67 MM HG (ref 85–95)
PO2 BLDA: 69 MM HG (ref 85–95)
PO2 BLDA: 73 MM HG (ref 85–95)
PO2 BLDA: 76 MM HG (ref 85–95)
PO2 BLDA: 78 MM HG (ref 85–95)
PO2 BLDA: 84 MM HG (ref 85–95)
PO2 BLDA: 90 MM HG (ref 85–95)
PO2 BLDA: 91 MM HG (ref 85–95)
PO2 BLDV: 47 MM HG (ref 35–45)
PO2 BLDV: 63 MM HG (ref 35–45)
POLYCHROMASIA BLD QL SMEAR: ABNORMAL
POLYCHROMASIA BLD QL SMEAR: ABNORMAL
POTASSIUM BLDA-SCNC: 3.6 MMOL/L (ref 3.5–5.3)
POTASSIUM BLDA-SCNC: 3.8 MMOL/L (ref 3.5–5.3)
POTASSIUM BLDA-SCNC: 3.9 MMOL/L (ref 3.5–5.3)
POTASSIUM BLDA-SCNC: 4.1 MMOL/L (ref 3.5–5.3)
POTASSIUM BLDA-SCNC: 4.2 MMOL/L (ref 3.5–5.3)
POTASSIUM BLDA-SCNC: 4.2 MMOL/L (ref 3.5–5.3)
POTASSIUM BLDA-SCNC: 4.3 MMOL/L (ref 3.5–5.3)
POTASSIUM BLDA-SCNC: 4.4 MMOL/L (ref 3.5–5.3)
POTASSIUM BLDA-SCNC: 4.4 MMOL/L (ref 3.5–5.3)
POTASSIUM BLDA-SCNC: 4.5 MMOL/L (ref 3.5–5.3)
POTASSIUM BLDA-SCNC: 4.7 MMOL/L (ref 3.5–5.3)
POTASSIUM BLDA-SCNC: 5.2 MMOL/L (ref 3.5–5.3)
POTASSIUM BLDA-SCNC: 5.4 MMOL/L (ref 3.5–5.3)
POTASSIUM BLDV-SCNC: 4.3 MMOL/L (ref 3.5–5.3)
POTASSIUM BLDV-SCNC: 4.9 MMOL/L (ref 3.5–5.3)
POTASSIUM SERPL-SCNC: 3.7 MMOL/L (ref 3.5–5.3)
POTASSIUM SERPL-SCNC: 3.7 MMOL/L (ref 3.5–5.3)
POTASSIUM SERPL-SCNC: 4 MMOL/L (ref 3.5–5.3)
POTASSIUM SERPL-SCNC: 4.2 MMOL/L (ref 3.5–5.3)
POTASSIUM SERPL-SCNC: 4.2 MMOL/L (ref 3.5–5.3)
POTASSIUM SERPL-SCNC: 4.3 MMOL/L (ref 3.5–5.3)
POTASSIUM SERPL-SCNC: 4.3 MMOL/L (ref 3.5–5.3)
POTASSIUM SERPL-SCNC: 4.5 MMOL/L (ref 3.5–5.3)
POTASSIUM SERPL-SCNC: 4.6 MMOL/L (ref 3.5–5.3)
POTASSIUM SERPL-SCNC: 4.7 MMOL/L (ref 3.5–5.3)
POTASSIUM SERPL-SCNC: 5 MMOL/L (ref 3.5–5.3)
POTASSIUM UR-SCNC: 65 MMOL/L
POTASSIUM/CREAT UR-RTO: 36 MMOL/G CREAT
PR INTERVAL: 124 MS
PR INTERVAL: 130 MS
PROCALCITONIN SERPL-MCNC: 1.36 NG/ML
PRODUCT BLOOD TYPE: 5100
PRODUCT BLOOD TYPE: 5100
PRODUCT BLOOD TYPE: 6200
PRODUCT CODE: NORMAL
PROT FLD-MCNC: 0.6 G/DL
PROT SERPL-MCNC: 5.4 G/DL (ref 6.4–8.2)
PROT SERPL-MCNC: 5.4 G/DL (ref 6.4–8.2)
PROT SERPL-MCNC: 5.5 G/DL (ref 6.4–8.2)
PROT SERPL-MCNC: 5.8 G/DL (ref 6.4–8.2)
PROT SERPL-MCNC: 5.8 G/DL (ref 6.4–8.2)
PROT UR STRIP.AUTO-MCNC: ABNORMAL MG/DL
PROTHROMBIN TIME: 19.7 SECONDS (ref 9.8–12.8)
PROTHROMBIN TIME: 21.2 SECONDS (ref 9.8–12.8)
PROTHROMBIN TIME: 22.5 SECONDS (ref 9.8–12.8)
PROTHROMBIN TIME: 38.1 SECONDS (ref 9.8–12.8)
Q ONSET: 223 MS
Q ONSET: 224 MS
QRS COUNT: 14 BEATS
QRS COUNT: 17 BEATS
QRS DURATION: 74 MS
QRS DURATION: 82 MS
QT INTERVAL: 314 MS
QT INTERVAL: 416 MS
QTC CALCULATION(BAZETT): 412 MS
QTC CALCULATION(BAZETT): 495 MS
QTC FREDERICIA: 377 MS
QTC FREDERICIA: 467 MS
R AXIS: -16 DEGREES
R AXIS: -17 DEGREES
RBC # BLD AUTO: 1.91 X10*6/UL (ref 4–5.2)
RBC # BLD AUTO: 2.12 X10*6/UL (ref 4–5.2)
RBC # BLD AUTO: 2.16 X10*6/UL (ref 4–5.2)
RBC # BLD AUTO: 2.32 X10*6/UL (ref 4–5.2)
RBC # BLD AUTO: 2.32 X10*6/UL (ref 4–5.2)
RBC # BLD AUTO: 2.35 X10*6/UL (ref 4–5.2)
RBC # BLD AUTO: 2.41 X10*6/UL (ref 4–5.2)
RBC # BLD AUTO: 2.41 X10*6/UL (ref 4–5.2)
RBC # BLD AUTO: 2.43 X10*6/UL (ref 4–5.2)
RBC # BLD AUTO: 2.64 X10*6/UL (ref 4–5.2)
RBC # BLD AUTO: 2.67 X10*6/UL (ref 4–5.2)
RBC # BLD AUTO: 2.96 X10*6/UL (ref 4–5.2)
RBC # FLD AUTO: ABNORMAL /UL
RBC # UR STRIP.AUTO: ABNORMAL /UL
RBC #/AREA URNS AUTO: ABNORMAL /HPF
RBC MORPH BLD: ABNORMAL
RBC MORPH BLD: NORMAL
RH FACTOR (ANTIGEN D): NORMAL
RH FACTOR (ANTIGEN D): NORMAL
S PNEUM AG UR QL: NEGATIVE
SALICYLATES SERPL-MCNC: <3 MG/DL
SAO2 % BLDA: 100 % (ref 94–100)
SAO2 % BLDA: 100 % (ref 94–100)
SAO2 % BLDA: 81 % (ref 94–100)
SAO2 % BLDA: 89 % (ref 94–100)
SAO2 % BLDA: 92 % (ref 94–100)
SAO2 % BLDA: 92 % (ref 94–100)
SAO2 % BLDA: 93 % (ref 94–100)
SAO2 % BLDA: 95 % (ref 94–100)
SAO2 % BLDA: 96 % (ref 94–100)
SAO2 % BLDA: 97 % (ref 94–100)
SAO2 % BLDA: 97 % (ref 94–100)
SAO2 % BLDA: 98 % (ref 94–100)
SAO2 % BLDA: 99 % (ref 94–100)
SAO2 % BLDV: 70 % (ref 45–75)
SAO2 % BLDV: 86 % (ref 45–75)
SODIUM BLDA-SCNC: 123 MMOL/L (ref 136–145)
SODIUM BLDA-SCNC: 124 MMOL/L (ref 136–145)
SODIUM BLDA-SCNC: 125 MMOL/L (ref 136–145)
SODIUM BLDA-SCNC: 126 MMOL/L (ref 136–145)
SODIUM BLDA-SCNC: 127 MMOL/L (ref 136–145)
SODIUM BLDA-SCNC: 127 MMOL/L (ref 136–145)
SODIUM BLDA-SCNC: 128 MMOL/L (ref 136–145)
SODIUM BLDA-SCNC: 129 MMOL/L (ref 136–145)
SODIUM BLDA-SCNC: 129 MMOL/L (ref 136–145)
SODIUM BLDV-SCNC: 127 MMOL/L (ref 136–145)
SODIUM BLDV-SCNC: 128 MMOL/L (ref 136–145)
SODIUM SERPL-SCNC: 125 MMOL/L (ref 136–145)
SODIUM SERPL-SCNC: 127 MMOL/L (ref 136–145)
SODIUM SERPL-SCNC: 128 MMOL/L (ref 136–145)
SODIUM SERPL-SCNC: 129 MMOL/L (ref 136–145)
SODIUM SERPL-SCNC: 129 MMOL/L (ref 136–145)
SODIUM SERPL-SCNC: 130 MMOL/L (ref 136–145)
SODIUM SERPL-SCNC: 132 MMOL/L (ref 136–145)
SODIUM UR-SCNC: 18 MMOL/L
SODIUM/CREAT UR-RTO: 10 MMOL/G CREAT
SP GR UR STRIP.AUTO: 1.02
SQUAMOUS #/AREA URNS AUTO: ABNORMAL /HPF
T AXIS: -15 DEGREES
T AXIS: 203 DEGREES
T OFFSET: 381 MS
T OFFSET: 431 MS
T3FREE SERPL-MCNC: 0.3 PG/ML (ref 2.3–4.2)
T4 FREE SERPL-MCNC: 0.32 NG/DL (ref 0.78–1.48)
T4 FREE SERPL-MCNC: 1.31 NG/DL (ref 0.78–1.48)
TARGETS BLD QL SMEAR: ABNORMAL
TARGETS BLD QL SMEAR: NORMAL
THYROPEROXIDASE AB SERPL-ACNC: <28 IU/ML
TIBC SERPL-MCNC: ABNORMAL UG/DL
TOBRAMYCIN SERPL-MCNC: 1.4 UG/ML
TOTAL CELLS COUNTED BLD: 100
TOTAL CELLS COUNTED BLD: 100
TOTAL CELLS COUNTED BLD: 119
TOTAL CELLS COUNTED BLD: 122
TOTAL CELLS COUNTED BLD: 122
TOTAL CELLS COUNTED BLD: 127
TOTAL CELLS COUNTED FLD: 100
TSH SERPL-ACNC: 17.27 MIU/L (ref 0.44–3.98)
TSH SERPL-ACNC: 6.31 MIU/L (ref 0.44–3.98)
UIBC SERPL-MCNC: <55 UG/DL (ref 110–370)
UNIT ABO: NORMAL
UNIT NUMBER: NORMAL
UNIT RH: NORMAL
UNIT VOLUME: 350
UNIT VOLUME: 350
UNIT VOLUME: 90
UREA/CREAT UR-SRTO: 0.5 G/G CREAT
UROBILINOGEN UR STRIP.AUTO-MCNC: NORMAL MG/DL
UUN UR-MCNC: 86 MG/DL
VANCOMYCIN SERPL-MCNC: 9.9 UG/ML (ref 5–20)
VANCOMYCIN TROUGH SERPL-MCNC: 20.1 UG/ML (ref 5–20)
VENTRICULAR RATE: 104 BPM
VENTRICULAR RATE: 85 BPM
VIT B12 SERPL-MCNC: 1370 PG/ML (ref 211–911)
WBC # BLD AUTO: 10.7 X10*3/UL (ref 4.4–11.3)
WBC # BLD AUTO: 13 X10*3/UL (ref 4.4–11.3)
WBC # BLD AUTO: 15 X10*3/UL (ref 4.4–11.3)
WBC # BLD AUTO: 16.1 X10*3/UL (ref 4.4–11.3)
WBC # BLD AUTO: 16.2 X10*3/UL (ref 4.4–11.3)
WBC # BLD AUTO: 16.2 X10*3/UL (ref 4.4–11.3)
WBC # BLD AUTO: 17.5 X10*3/UL (ref 4.4–11.3)
WBC # BLD AUTO: 21.5 X10*3/UL (ref 4.4–11.3)
WBC # BLD AUTO: 25.4 X10*3/UL (ref 4.4–11.3)
WBC # BLD AUTO: 25.6 X10*3/UL (ref 4.4–11.3)
WBC # BLD AUTO: 28.6 X10*3/UL (ref 4.4–11.3)
WBC # BLD AUTO: 32.1 X10*3/UL (ref 4.4–11.3)
WBC # FLD AUTO: ABNORMAL /UL
WBC #/AREA URNS AUTO: ABNORMAL /HPF
WBC CLUMPS #/AREA URNS AUTO: ABNORMAL /HPF
XM INTEP: NORMAL
XM INTEP: NORMAL

## 2024-01-01 PROCEDURE — 80069 RENAL FUNCTION PANEL: CPT | Mod: CCI | Performed by: STUDENT IN AN ORGANIZED HEALTH CARE EDUCATION/TRAINING PROGRAM

## 2024-01-01 PROCEDURE — 82042 OTHER SOURCE ALBUMIN QUAN EA: CPT

## 2024-01-01 PROCEDURE — 84132 ASSAY OF SERUM POTASSIUM: CPT | Performed by: STUDENT IN AN ORGANIZED HEALTH CARE EDUCATION/TRAINING PROGRAM

## 2024-01-01 PROCEDURE — 2500000001 HC RX 250 WO HCPCS SELF ADMINISTERED DRUGS (ALT 637 FOR MEDICARE OP): Performed by: STUDENT IN AN ORGANIZED HEALTH CARE EDUCATION/TRAINING PROGRAM

## 2024-01-01 PROCEDURE — 83010 ASSAY OF HAPTOGLOBIN QUANT: CPT

## 2024-01-01 PROCEDURE — 3E0G76Z INTRODUCTION OF NUTRITIONAL SUBSTANCE INTO UPPER GI, VIA NATURAL OR ARTIFICIAL OPENING: ICD-10-PCS | Performed by: INTERNAL MEDICINE

## 2024-01-01 PROCEDURE — 2500000002 HC RX 250 W HCPCS SELF ADMINISTERED DRUGS (ALT 637 FOR MEDICARE OP, ALT 636 FOR OP/ED): Performed by: STUDENT IN AN ORGANIZED HEALTH CARE EDUCATION/TRAINING PROGRAM

## 2024-01-01 PROCEDURE — 83540 ASSAY OF IRON: CPT | Performed by: STUDENT IN AN ORGANIZED HEALTH CARE EDUCATION/TRAINING PROGRAM

## 2024-01-01 PROCEDURE — 37799 UNLISTED PX VASCULAR SURGERY: CPT | Performed by: STUDENT IN AN ORGANIZED HEALTH CARE EDUCATION/TRAINING PROGRAM

## 2024-01-01 PROCEDURE — 71045 X-RAY EXAM CHEST 1 VIEW: CPT | Performed by: RADIOLOGY

## 2024-01-01 PROCEDURE — 99291 CRITICAL CARE FIRST HOUR: CPT | Mod: 25 | Performed by: EMERGENCY MEDICINE

## 2024-01-01 PROCEDURE — 99291 CRITICAL CARE FIRST HOUR: CPT

## 2024-01-01 PROCEDURE — 2500000004 HC RX 250 GENERAL PHARMACY W/ HCPCS (ALT 636 FOR OP/ED): Performed by: STUDENT IN AN ORGANIZED HEALTH CARE EDUCATION/TRAINING PROGRAM

## 2024-01-01 PROCEDURE — 80053 COMPREHEN METABOLIC PANEL: CPT | Performed by: STUDENT IN AN ORGANIZED HEALTH CARE EDUCATION/TRAINING PROGRAM

## 2024-01-01 PROCEDURE — 85027 COMPLETE CBC AUTOMATED: CPT | Performed by: STUDENT IN AN ORGANIZED HEALTH CARE EDUCATION/TRAINING PROGRAM

## 2024-01-01 PROCEDURE — 36556 INSERT NON-TUNNEL CV CATH: CPT | Performed by: INTERNAL MEDICINE

## 2024-01-01 PROCEDURE — 2500000004 HC RX 250 GENERAL PHARMACY W/ HCPCS (ALT 636 FOR OP/ED): Mod: SE | Performed by: STUDENT IN AN ORGANIZED HEALTH CARE EDUCATION/TRAINING PROGRAM

## 2024-01-01 PROCEDURE — 74018 RADEX ABDOMEN 1 VIEW: CPT | Performed by: RADIOLOGY

## 2024-01-01 PROCEDURE — 82248 BILIRUBIN DIRECT: CPT | Performed by: STUDENT IN AN ORGANIZED HEALTH CARE EDUCATION/TRAINING PROGRAM

## 2024-01-01 PROCEDURE — 74018 RADEX ABDOMEN 1 VIEW: CPT

## 2024-01-01 PROCEDURE — 82947 ASSAY GLUCOSE BLOOD QUANT: CPT

## 2024-01-01 PROCEDURE — 85027 COMPLETE CBC AUTOMATED: CPT

## 2024-01-01 PROCEDURE — C9113 INJ PANTOPRAZOLE SODIUM, VIA: HCPCS

## 2024-01-01 PROCEDURE — 71045 X-RAY EXAM CHEST 1 VIEW: CPT

## 2024-01-01 PROCEDURE — 82330 ASSAY OF CALCIUM: CPT | Performed by: STUDENT IN AN ORGANIZED HEALTH CARE EDUCATION/TRAINING PROGRAM

## 2024-01-01 PROCEDURE — 5A1D90Z PERFORMANCE OF URINARY FILTRATION, CONTINUOUS, GREATER THAN 18 HOURS PER DAY: ICD-10-PCS | Performed by: INTERNAL MEDICINE

## 2024-01-01 PROCEDURE — 49083 ABD PARACENTESIS W/IMAGING: CPT

## 2024-01-01 PROCEDURE — 94003 VENT MGMT INPAT SUBQ DAY: CPT

## 2024-01-01 PROCEDURE — 3E033XZ INTRODUCTION OF VASOPRESSOR INTO PERIPHERAL VEIN, PERCUTANEOUS APPROACH: ICD-10-PCS | Performed by: INTERNAL MEDICINE

## 2024-01-01 PROCEDURE — 0W9G3ZZ DRAINAGE OF PERITONEAL CAVITY, PERCUTANEOUS APPROACH: ICD-10-PCS

## 2024-01-01 PROCEDURE — 86376 MICROSOMAL ANTIBODY EACH: CPT | Performed by: STUDENT IN AN ORGANIZED HEALTH CARE EDUCATION/TRAINING PROGRAM

## 2024-01-01 PROCEDURE — P9047 ALBUMIN (HUMAN), 25%, 50ML: HCPCS | Mod: JZ

## 2024-01-01 PROCEDURE — 84100 ASSAY OF PHOSPHORUS: CPT | Performed by: STUDENT IN AN ORGANIZED HEALTH CARE EDUCATION/TRAINING PROGRAM

## 2024-01-01 PROCEDURE — 90937 HEMODIALYSIS REPEATED EVAL: CPT

## 2024-01-01 PROCEDURE — 80143 DRUG ASSAY ACETAMINOPHEN: CPT | Performed by: STUDENT IN AN ORGANIZED HEALTH CARE EDUCATION/TRAINING PROGRAM

## 2024-01-01 PROCEDURE — 2020000001 HC ICU ROOM DAILY

## 2024-01-01 PROCEDURE — 31500 INSERT EMERGENCY AIRWAY: CPT | Performed by: EMERGENCY MEDICINE

## 2024-01-01 PROCEDURE — 2500000004 HC RX 250 GENERAL PHARMACY W/ HCPCS (ALT 636 FOR OP/ED)

## 2024-01-01 PROCEDURE — 70450 CT HEAD/BRAIN W/O DYE: CPT | Performed by: RADIOLOGY

## 2024-01-01 PROCEDURE — P9047 ALBUMIN (HUMAN), 25%, 50ML: HCPCS | Mod: JZ,SE | Performed by: EMERGENCY MEDICINE

## 2024-01-01 PROCEDURE — 2500000004 HC RX 250 GENERAL PHARMACY W/ HCPCS (ALT 636 FOR OP/ED): Performed by: INTERNAL MEDICINE

## 2024-01-01 PROCEDURE — 99233 SBSQ HOSP IP/OBS HIGH 50: CPT

## 2024-01-01 PROCEDURE — 85007 BL SMEAR W/DIFF WBC COUNT: CPT

## 2024-01-01 PROCEDURE — 36556 INSERT NON-TUNNEL CV CATH: CPT | Performed by: EMERGENCY MEDICINE

## 2024-01-01 PROCEDURE — 84439 ASSAY OF FREE THYROXINE: CPT | Performed by: STUDENT IN AN ORGANIZED HEALTH CARE EDUCATION/TRAINING PROGRAM

## 2024-01-01 PROCEDURE — 74176 CT ABD & PELVIS W/O CONTRAST: CPT | Performed by: RADIOLOGY

## 2024-01-01 PROCEDURE — 99223 1ST HOSP IP/OBS HIGH 75: CPT

## 2024-01-01 PROCEDURE — C9113 INJ PANTOPRAZOLE SODIUM, VIA: HCPCS | Mod: SE | Performed by: STUDENT IN AN ORGANIZED HEALTH CARE EDUCATION/TRAINING PROGRAM

## 2024-01-01 PROCEDURE — 49083 ABD PARACENTESIS W/IMAGING: CPT | Performed by: EMERGENCY MEDICINE

## 2024-01-01 PROCEDURE — 83880 ASSAY OF NATRIURETIC PEPTIDE: CPT | Performed by: STUDENT IN AN ORGANIZED HEALTH CARE EDUCATION/TRAINING PROGRAM

## 2024-01-01 PROCEDURE — 83735 ASSAY OF MAGNESIUM: CPT | Performed by: STUDENT IN AN ORGANIZED HEALTH CARE EDUCATION/TRAINING PROGRAM

## 2024-01-01 PROCEDURE — 84132 ASSAY OF SERUM POTASSIUM: CPT

## 2024-01-01 PROCEDURE — 85007 BL SMEAR W/DIFF WBC COUNT: CPT | Performed by: STUDENT IN AN ORGANIZED HEALTH CARE EDUCATION/TRAINING PROGRAM

## 2024-01-01 PROCEDURE — 2500000005 HC RX 250 GENERAL PHARMACY W/O HCPCS: Performed by: STUDENT IN AN ORGANIZED HEALTH CARE EDUCATION/TRAINING PROGRAM

## 2024-01-01 PROCEDURE — 2500000005 HC RX 250 GENERAL PHARMACY W/O HCPCS: Mod: SE

## 2024-01-01 PROCEDURE — 87449 NOS EACH ORGANISM AG IA: CPT | Performed by: STUDENT IN AN ORGANIZED HEALTH CARE EDUCATION/TRAINING PROGRAM

## 2024-01-01 PROCEDURE — 36415 COLL VENOUS BLD VENIPUNCTURE: CPT | Performed by: STUDENT IN AN ORGANIZED HEALTH CARE EDUCATION/TRAINING PROGRAM

## 2024-01-01 PROCEDURE — 82436 ASSAY OF URINE CHLORIDE: CPT | Performed by: STUDENT IN AN ORGANIZED HEALTH CARE EDUCATION/TRAINING PROGRAM

## 2024-01-01 PROCEDURE — 74176 CT ABD & PELVIS W/O CONTRAST: CPT

## 2024-01-01 PROCEDURE — 99285 EMERGENCY DEPT VISIT HI MDM: CPT | Mod: 25

## 2024-01-01 PROCEDURE — 82565 ASSAY OF CREATININE: CPT | Performed by: STUDENT IN AN ORGANIZED HEALTH CARE EDUCATION/TRAINING PROGRAM

## 2024-01-01 PROCEDURE — 80047 BASIC METABLC PNL IONIZED CA: CPT | Performed by: STUDENT IN AN ORGANIZED HEALTH CARE EDUCATION/TRAINING PROGRAM

## 2024-01-01 PROCEDURE — 83690 ASSAY OF LIPASE: CPT | Performed by: STUDENT IN AN ORGANIZED HEALTH CARE EDUCATION/TRAINING PROGRAM

## 2024-01-01 PROCEDURE — 0BH17EZ INSERTION OF ENDOTRACHEAL AIRWAY INTO TRACHEA, VIA NATURAL OR ARTIFICIAL OPENING: ICD-10-PCS | Performed by: STUDENT IN AN ORGANIZED HEALTH CARE EDUCATION/TRAINING PROGRAM

## 2024-01-01 PROCEDURE — 02HV33Z INSERTION OF INFUSION DEVICE INTO SUPERIOR VENA CAVA, PERCUTANEOUS APPROACH: ICD-10-PCS | Performed by: RADIOLOGY

## 2024-01-01 PROCEDURE — 36556 INSERT NON-TUNNEL CV CATH: CPT | Mod: GC | Performed by: INTERNAL MEDICINE

## 2024-01-01 PROCEDURE — 36430 TRANSFUSION BLD/BLD COMPNT: CPT

## 2024-01-01 PROCEDURE — 85240 CLOT FACTOR VIII AHG 1 STAGE: CPT

## 2024-01-01 PROCEDURE — 84443 ASSAY THYROID STIM HORMONE: CPT | Performed by: STUDENT IN AN ORGANIZED HEALTH CARE EDUCATION/TRAINING PROGRAM

## 2024-01-01 PROCEDURE — 93010 ELECTROCARDIOGRAM REPORT: CPT | Performed by: INTERNAL MEDICINE

## 2024-01-01 PROCEDURE — 99254 IP/OBS CNSLTJ NEW/EST MOD 60: CPT | Performed by: INTERNAL MEDICINE

## 2024-01-01 PROCEDURE — 96374 THER/PROPH/DIAG INJ IV PUSH: CPT | Mod: 59

## 2024-01-01 PROCEDURE — 80202 ASSAY OF VANCOMYCIN: CPT

## 2024-01-01 PROCEDURE — 2500000001 HC RX 250 WO HCPCS SELF ADMINISTERED DRUGS (ALT 637 FOR MEDICARE OP)

## 2024-01-01 PROCEDURE — 87641 MR-STAPH DNA AMP PROBE: CPT | Performed by: STUDENT IN AN ORGANIZED HEALTH CARE EDUCATION/TRAINING PROGRAM

## 2024-01-01 PROCEDURE — 94640 AIRWAY INHALATION TREATMENT: CPT

## 2024-01-01 PROCEDURE — 51702 INSERT TEMP BLADDER CATH: CPT

## 2024-01-01 PROCEDURE — 94645 CONT INHLJ TX EACH ADDL HOUR: CPT

## 2024-01-01 PROCEDURE — 87040 BLOOD CULTURE FOR BACTERIA: CPT | Performed by: STUDENT IN AN ORGANIZED HEALTH CARE EDUCATION/TRAINING PROGRAM

## 2024-01-01 PROCEDURE — 81001 URINALYSIS AUTO W/SCOPE: CPT | Performed by: STUDENT IN AN ORGANIZED HEALTH CARE EDUCATION/TRAINING PROGRAM

## 2024-01-01 PROCEDURE — 85384 FIBRINOGEN ACTIVITY: CPT | Performed by: STUDENT IN AN ORGANIZED HEALTH CARE EDUCATION/TRAINING PROGRAM

## 2024-01-01 PROCEDURE — 84520 ASSAY OF UREA NITROGEN: CPT | Performed by: STUDENT IN AN ORGANIZED HEALTH CARE EDUCATION/TRAINING PROGRAM

## 2024-01-01 PROCEDURE — P9016 RBC LEUKOCYTES REDUCED: HCPCS

## 2024-01-01 PROCEDURE — 2500000005 HC RX 250 GENERAL PHARMACY W/O HCPCS: Mod: SE | Performed by: EMERGENCY MEDICINE

## 2024-01-01 PROCEDURE — 83036 HEMOGLOBIN GLYCOSYLATED A1C: CPT | Performed by: STUDENT IN AN ORGANIZED HEALTH CARE EDUCATION/TRAINING PROGRAM

## 2024-01-01 PROCEDURE — 36620 INSERTION CATHETER ARTERY: CPT

## 2024-01-01 PROCEDURE — 84481 FREE ASSAY (FT-3): CPT | Performed by: STUDENT IN AN ORGANIZED HEALTH CARE EDUCATION/TRAINING PROGRAM

## 2024-01-01 PROCEDURE — 93975 VASCULAR STUDY: CPT

## 2024-01-01 PROCEDURE — 85610 PROTHROMBIN TIME: CPT | Performed by: STUDENT IN AN ORGANIZED HEALTH CARE EDUCATION/TRAINING PROGRAM

## 2024-01-01 PROCEDURE — 84157 ASSAY OF PROTEIN OTHER: CPT | Performed by: STUDENT IN AN ORGANIZED HEALTH CARE EDUCATION/TRAINING PROGRAM

## 2024-01-01 PROCEDURE — 94002 VENT MGMT INPAT INIT DAY: CPT | Mod: CCI

## 2024-01-01 PROCEDURE — 85610 PROTHROMBIN TIME: CPT

## 2024-01-01 PROCEDURE — 82607 VITAMIN B-12: CPT | Performed by: STUDENT IN AN ORGANIZED HEALTH CARE EDUCATION/TRAINING PROGRAM

## 2024-01-01 PROCEDURE — 88104 CYTOPATH FL NONGYN SMEARS: CPT | Performed by: PATHOLOGY

## 2024-01-01 PROCEDURE — 31500 INSERT EMERGENCY AIRWAY: CPT | Performed by: STUDENT IN AN ORGANIZED HEALTH CARE EDUCATION/TRAINING PROGRAM

## 2024-01-01 PROCEDURE — 85384 FIBRINOGEN ACTIVITY: CPT

## 2024-01-01 PROCEDURE — 93005 ELECTROCARDIOGRAM TRACING: CPT

## 2024-01-01 PROCEDURE — 82945 GLUCOSE OTHER FLUID: CPT | Performed by: STUDENT IN AN ORGANIZED HEALTH CARE EDUCATION/TRAINING PROGRAM

## 2024-01-01 PROCEDURE — 82746 ASSAY OF FOLIC ACID SERUM: CPT | Performed by: STUDENT IN AN ORGANIZED HEALTH CARE EDUCATION/TRAINING PROGRAM

## 2024-01-01 PROCEDURE — 83605 ASSAY OF LACTIC ACID: CPT | Performed by: STUDENT IN AN ORGANIZED HEALTH CARE EDUCATION/TRAINING PROGRAM

## 2024-01-01 PROCEDURE — 87086 URINE CULTURE/COLONY COUNT: CPT | Performed by: STUDENT IN AN ORGANIZED HEALTH CARE EDUCATION/TRAINING PROGRAM

## 2024-01-01 PROCEDURE — 82570 ASSAY OF URINE CREATININE: CPT | Performed by: STUDENT IN AN ORGANIZED HEALTH CARE EDUCATION/TRAINING PROGRAM

## 2024-01-01 PROCEDURE — 82550 ASSAY OF CK (CPK): CPT | Performed by: STUDENT IN AN ORGANIZED HEALTH CARE EDUCATION/TRAINING PROGRAM

## 2024-01-01 PROCEDURE — 86901 BLOOD TYPING SEROLOGIC RH(D): CPT | Performed by: STUDENT IN AN ORGANIZED HEALTH CARE EDUCATION/TRAINING PROGRAM

## 2024-01-01 PROCEDURE — 80202 ASSAY OF VANCOMYCIN: CPT | Performed by: STUDENT IN AN ORGANIZED HEALTH CARE EDUCATION/TRAINING PROGRAM

## 2024-01-01 PROCEDURE — 94644 CONT INHLJ TX 1ST HOUR: CPT

## 2024-01-01 PROCEDURE — 82140 ASSAY OF AMMONIA: CPT | Performed by: STUDENT IN AN ORGANIZED HEALTH CARE EDUCATION/TRAINING PROGRAM

## 2024-01-01 PROCEDURE — 96365 THER/PROPH/DIAG IV INF INIT: CPT | Mod: 59

## 2024-01-01 PROCEDURE — 96375 TX/PRO/DX INJ NEW DRUG ADDON: CPT | Mod: 59

## 2024-01-01 PROCEDURE — 37799 UNLISTED PX VASCULAR SURGERY: CPT

## 2024-01-01 PROCEDURE — 85060 BLOOD SMEAR INTERPRETATION: CPT | Performed by: PATHOLOGY

## 2024-01-01 PROCEDURE — 87205 SMEAR GRAM STAIN: CPT | Performed by: STUDENT IN AN ORGANIZED HEALTH CARE EDUCATION/TRAINING PROGRAM

## 2024-01-01 PROCEDURE — 82728 ASSAY OF FERRITIN: CPT | Performed by: STUDENT IN AN ORGANIZED HEALTH CARE EDUCATION/TRAINING PROGRAM

## 2024-01-01 PROCEDURE — 99233 SBSQ HOSP IP/OBS HIGH 50: CPT | Performed by: INTERNAL MEDICINE

## 2024-01-01 PROCEDURE — 2500000004 HC RX 250 GENERAL PHARMACY W/ HCPCS (ALT 636 FOR OP/ED): Performed by: EMERGENCY MEDICINE

## 2024-01-01 PROCEDURE — C9113 INJ PANTOPRAZOLE SODIUM, VIA: HCPCS | Performed by: STUDENT IN AN ORGANIZED HEALTH CARE EDUCATION/TRAINING PROGRAM

## 2024-01-01 PROCEDURE — 36556 INSERT NON-TUNNEL CV CATH: CPT | Performed by: STUDENT IN AN ORGANIZED HEALTH CARE EDUCATION/TRAINING PROGRAM

## 2024-01-01 PROCEDURE — 76705 ECHO EXAM OF ABDOMEN: CPT | Performed by: RADIOLOGY

## 2024-01-01 PROCEDURE — 2500000004 HC RX 250 GENERAL PHARMACY W/ HCPCS (ALT 636 FOR OP/ED): Mod: SE | Performed by: EMERGENCY MEDICINE

## 2024-01-01 PROCEDURE — 2500000002 HC RX 250 W HCPCS SELF ADMINISTERED DRUGS (ALT 637 FOR MEDICARE OP, ALT 636 FOR OP/ED)

## 2024-01-01 PROCEDURE — 71250 CT THORAX DX C-: CPT | Performed by: RADIOLOGY

## 2024-01-01 PROCEDURE — P9047 ALBUMIN (HUMAN), 25%, 50ML: HCPCS | Mod: JZ | Performed by: STUDENT IN AN ORGANIZED HEALTH CARE EDUCATION/TRAINING PROGRAM

## 2024-01-01 PROCEDURE — 36620 INSERTION CATHETER ARTERY: CPT | Performed by: EMERGENCY MEDICINE

## 2024-01-01 PROCEDURE — 2500000005 HC RX 250 GENERAL PHARMACY W/O HCPCS: Mod: SE | Performed by: STUDENT IN AN ORGANIZED HEALTH CARE EDUCATION/TRAINING PROGRAM

## 2024-01-01 PROCEDURE — 89051 BODY FLUID CELL COUNT: CPT | Performed by: STUDENT IN AN ORGANIZED HEALTH CARE EDUCATION/TRAINING PROGRAM

## 2024-01-01 PROCEDURE — 2500000004 HC RX 250 GENERAL PHARMACY W/ HCPCS (ALT 636 FOR OP/ED): Mod: SE

## 2024-01-01 PROCEDURE — A4217 STERILE WATER/SALINE, 500 ML: HCPCS | Performed by: STUDENT IN AN ORGANIZED HEALTH CARE EDUCATION/TRAINING PROGRAM

## 2024-01-01 PROCEDURE — 87899 AGENT NOS ASSAY W/OPTIC: CPT | Performed by: STUDENT IN AN ORGANIZED HEALTH CARE EDUCATION/TRAINING PROGRAM

## 2024-01-01 PROCEDURE — 76937 US GUIDE VASCULAR ACCESS: CPT | Performed by: EMERGENCY MEDICINE

## 2024-01-01 PROCEDURE — 70450 CT HEAD/BRAIN W/O DYE: CPT

## 2024-01-01 PROCEDURE — 5A1945Z RESPIRATORY VENTILATION, 24-96 CONSECUTIVE HOURS: ICD-10-PCS | Performed by: STUDENT IN AN ORGANIZED HEALTH CARE EDUCATION/TRAINING PROGRAM

## 2024-01-01 PROCEDURE — 80200 ASSAY OF TOBRAMYCIN: CPT | Performed by: STUDENT IN AN ORGANIZED HEALTH CARE EDUCATION/TRAINING PROGRAM

## 2024-01-01 PROCEDURE — 86920 COMPATIBILITY TEST SPIN: CPT

## 2024-01-01 PROCEDURE — 85025 COMPLETE CBC W/AUTO DIFF WBC: CPT | Performed by: STUDENT IN AN ORGANIZED HEALTH CARE EDUCATION/TRAINING PROGRAM

## 2024-01-01 PROCEDURE — 80307 DRUG TEST PRSMV CHEM ANLYZR: CPT | Performed by: STUDENT IN AN ORGANIZED HEALTH CARE EDUCATION/TRAINING PROGRAM

## 2024-01-01 PROCEDURE — 99291 CRITICAL CARE FIRST HOUR: CPT | Performed by: EMERGENCY MEDICINE

## 2024-01-01 PROCEDURE — 96361 HYDRATE IV INFUSION ADD-ON: CPT | Mod: 59

## 2024-01-01 PROCEDURE — 87070 CULTURE OTHR SPECIMN AEROBIC: CPT | Performed by: STUDENT IN AN ORGANIZED HEALTH CARE EDUCATION/TRAINING PROGRAM

## 2024-01-01 PROCEDURE — 84145 PROCALCITONIN (PCT): CPT | Performed by: STUDENT IN AN ORGANIZED HEALTH CARE EDUCATION/TRAINING PROGRAM

## 2024-01-01 PROCEDURE — 93975 VASCULAR STUDY: CPT | Performed by: RADIOLOGY

## 2024-01-01 PROCEDURE — 83615 LACTATE (LD) (LDH) ENZYME: CPT

## 2024-01-01 PROCEDURE — P9012 CRYOPRECIPITATE EACH UNIT: HCPCS

## 2024-01-01 PROCEDURE — 84132 ASSAY OF SERUM POTASSIUM: CPT | Performed by: HOSPITALIST

## 2024-01-01 PROCEDURE — 99223 1ST HOSP IP/OBS HIGH 75: CPT | Performed by: INTERNAL MEDICINE

## 2024-01-01 RX ORDER — PANTOPRAZOLE SODIUM 40 MG/10ML
40 INJECTION, POWDER, LYOPHILIZED, FOR SOLUTION INTRAVENOUS 2 TIMES DAILY
Status: CANCELLED | OUTPATIENT
Start: 2024-01-01

## 2024-01-01 RX ORDER — INDOMETHACIN 25 MG/1
CAPSULE ORAL
Status: COMPLETED
Start: 2024-01-01 | End: 2024-01-01

## 2024-01-01 RX ORDER — ATROPINE SULFATE 0.1 MG/ML
INJECTION INTRAVENOUS
Status: DISPENSED
Start: 2024-01-01 | End: 2024-01-01

## 2024-01-01 RX ORDER — NOREPINEPHRINE BITARTRATE/D5W 8 MG/250ML
PLASTIC BAG, INJECTION (ML) INTRAVENOUS
Status: COMPLETED
Start: 2024-01-01 | End: 2024-01-01

## 2024-01-01 RX ORDER — MIDAZOLAM HYDROCHLORIDE 1 MG/ML
2 INJECTION INTRAMUSCULAR; INTRAVENOUS ONCE
Status: COMPLETED | OUTPATIENT
Start: 2024-01-01 | End: 2024-01-01

## 2024-01-01 RX ORDER — ALBUMIN HUMAN 250 G/1000ML
SOLUTION INTRAVENOUS
Status: COMPLETED
Start: 2024-01-01 | End: 2024-01-01

## 2024-01-01 RX ORDER — VANCOMYCIN HYDROCHLORIDE 500 MG/100ML
500 INJECTION, SOLUTION INTRAVENOUS EVERY 12 HOURS
Status: DISCONTINUED | OUTPATIENT
Start: 2024-01-01 | End: 2024-01-01

## 2024-01-01 RX ORDER — FENTANYL CITRATE-0.9 % NACL/PF 10 MCG/ML
25-200 PLASTIC BAG, INJECTION (ML) INTRAVENOUS CONTINUOUS
Status: DISCONTINUED | OUTPATIENT
Start: 2024-01-01 | End: 2024-01-01

## 2024-01-01 RX ORDER — PANTOPRAZOLE SODIUM 40 MG/10ML
40 INJECTION, POWDER, LYOPHILIZED, FOR SOLUTION INTRAVENOUS 2 TIMES DAILY
Status: DISCONTINUED | OUTPATIENT
Start: 2024-01-01 | End: 2024-01-01

## 2024-01-01 RX ORDER — LORAZEPAM 2 MG/ML
0.5 INJECTION INTRAMUSCULAR EVERY 4 HOURS PRN
Status: DISCONTINUED | OUTPATIENT
Start: 2024-01-01 | End: 2024-01-01 | Stop reason: HOSPADM

## 2024-01-01 RX ORDER — AZITHROMYCIN 500 MG/1
500 TABLET, FILM COATED ORAL
Qty: 2 TABLET | Refills: 0 | Status: DISCONTINUED | OUTPATIENT
Start: 2024-01-01 | End: 2024-01-01

## 2024-01-01 RX ORDER — ROCURONIUM BROMIDE 10 MG/ML
INJECTION, SOLUTION INTRAVENOUS
Status: COMPLETED
Start: 2024-01-01 | End: 2024-01-01

## 2024-01-01 RX ORDER — VANCOMYCIN HYDROCHLORIDE 1 G/20ML
INJECTION, POWDER, LYOPHILIZED, FOR SOLUTION INTRAVENOUS DAILY PRN
Status: DISCONTINUED | OUTPATIENT
Start: 2024-01-01 | End: 2024-01-01

## 2024-01-01 RX ORDER — VANCOMYCIN HYDROCHLORIDE 750 MG/150ML
750 INJECTION, SOLUTION INTRAVENOUS EVERY 12 HOURS
Status: DISCONTINUED | OUTPATIENT
Start: 2024-01-01 | End: 2024-01-01

## 2024-01-01 RX ORDER — POLYETHYLENE GLYCOL 3350 17 G/17G
34 POWDER, FOR SOLUTION ORAL EVERY 4 HOURS
Status: DISCONTINUED | OUTPATIENT
Start: 2024-01-01 | End: 2024-01-01 | Stop reason: SDUPTHER

## 2024-01-01 RX ORDER — KETOROLAC TROMETHAMINE 30 MG/ML
15 INJECTION, SOLUTION INTRAMUSCULAR; INTRAVENOUS EVERY 6 HOURS PRN
Status: DISCONTINUED | OUTPATIENT
Start: 2024-01-01 | End: 2024-01-01 | Stop reason: HOSPADM

## 2024-01-01 RX ORDER — POLYETHYLENE GLYCOL 3350 17 G/17G
POWDER, FOR SOLUTION ORAL
Status: COMPLETED
Start: 2024-01-01 | End: 2024-01-01

## 2024-01-01 RX ORDER — MIDODRINE HYDROCHLORIDE 5 MG/1
5 TABLET ORAL ONCE
Status: DISCONTINUED | OUTPATIENT
Start: 2024-01-01 | End: 2024-01-01

## 2024-01-01 RX ORDER — LEVOTHYROXINE SODIUM 88 UG/1
88 TABLET ORAL DAILY
Status: DISCONTINUED | OUTPATIENT
Start: 2024-01-01 | End: 2024-01-01

## 2024-01-01 RX ORDER — ESCITALOPRAM OXALATE 5 MG/1
5 TABLET ORAL DAILY
COMMUNITY

## 2024-01-01 RX ORDER — INSULIN LISPRO 100 [IU]/ML
0-10 INJECTION, SOLUTION INTRAVENOUS; SUBCUTANEOUS
Status: DISCONTINUED | OUTPATIENT
Start: 2024-01-01 | End: 2024-01-01

## 2024-01-01 RX ORDER — FOLIC ACID 1 MG/1
1 TABLET ORAL DAILY
Status: DISCONTINUED | OUTPATIENT
Start: 2024-01-01 | End: 2024-01-01

## 2024-01-01 RX ORDER — MIDAZOLAM HYDROCHLORIDE 1 MG/ML
INJECTION INTRAMUSCULAR; INTRAVENOUS
Status: COMPLETED
Start: 2024-01-01 | End: 2024-01-01

## 2024-01-01 RX ORDER — PANTOPRAZOLE SODIUM 40 MG/10ML
40 INJECTION, POWDER, LYOPHILIZED, FOR SOLUTION INTRAVENOUS DAILY
Status: DISCONTINUED | OUTPATIENT
Start: 2024-04-15 | End: 2024-01-01

## 2024-01-01 RX ORDER — DEXTROSE 50 % IN WATER (D50W) INTRAVENOUS SYRINGE
25
Status: DISCONTINUED | OUTPATIENT
Start: 2024-01-01 | End: 2024-01-01

## 2024-01-01 RX ORDER — BISACODYL 10 MG/1
10 SUPPOSITORY RECTAL ONCE
Status: DISCONTINUED | OUTPATIENT
Start: 2024-01-01 | End: 2024-01-01

## 2024-01-01 RX ORDER — FENTANYL CITRATE 50 UG/ML
50 INJECTION, SOLUTION INTRAMUSCULAR; INTRAVENOUS EVERY 2 HOUR PRN
Status: DISCONTINUED | OUTPATIENT
Start: 2024-01-01 | End: 2024-01-01 | Stop reason: HOSPADM

## 2024-01-01 RX ORDER — LACTULOSE 10 G/15ML
20 SOLUTION ORAL ONCE
Status: DISCONTINUED | OUTPATIENT
Start: 2024-01-01 | End: 2024-01-01

## 2024-01-01 RX ORDER — ALBUMIN HUMAN 250 G/1000ML
50 SOLUTION INTRAVENOUS ONCE
Status: COMPLETED | OUTPATIENT
Start: 2024-01-01 | End: 2024-01-01

## 2024-01-01 RX ORDER — DEXTROSE MONOHYDRATE AND SODIUM CHLORIDE 5; .9 G/100ML; G/100ML
200 INJECTION, SOLUTION INTRAVENOUS CONTINUOUS
Status: DISCONTINUED | OUTPATIENT
Start: 2024-01-01 | End: 2024-01-01

## 2024-01-01 RX ORDER — INSULIN LISPRO 100 [IU]/ML
0-5 INJECTION, SOLUTION INTRAVENOUS; SUBCUTANEOUS EVERY 4 HOURS
Status: DISCONTINUED | OUTPATIENT
Start: 2024-01-01 | End: 2024-01-01

## 2024-01-01 RX ORDER — DEXTROSE 50 % IN WATER (D50W) INTRAVENOUS SYRINGE
12.5
Status: DISCONTINUED | OUTPATIENT
Start: 2024-01-01 | End: 2024-01-01

## 2024-01-01 RX ORDER — ALBUMIN HUMAN 250 G/1000ML
1 SOLUTION INTRAVENOUS ONCE
Status: COMPLETED | OUTPATIENT
Start: 2024-01-01 | End: 2024-01-01

## 2024-01-01 RX ORDER — FENTANYL CITRATE-0.9 % NACL/PF 10 MCG/ML
25-200 PLASTIC BAG, INJECTION (ML) INTRAVENOUS CONTINUOUS
Status: DISCONTINUED | OUTPATIENT
Start: 2024-01-01 | End: 2024-01-01 | Stop reason: HOSPADM

## 2024-01-01 RX ORDER — DEXTROSE 50 % IN WATER (D50W) INTRAVENOUS SYRINGE
Status: COMPLETED
Start: 2024-01-01 | End: 2024-01-01

## 2024-01-01 RX ORDER — FOLIC ACID 1 MG/1
1 TABLET ORAL DAILY
COMMUNITY

## 2024-01-01 RX ORDER — LANOLIN ALCOHOL/MO/W.PET/CERES
100 CREAM (GRAM) TOPICAL DAILY
Status: CANCELLED | OUTPATIENT
Start: 2024-01-01

## 2024-01-01 RX ORDER — PANTOPRAZOLE SODIUM 40 MG/10ML
40 INJECTION, POWDER, LYOPHILIZED, FOR SOLUTION INTRAVENOUS DAILY
Status: DISCONTINUED | OUTPATIENT
Start: 2024-01-01 | End: 2024-01-01

## 2024-01-01 RX ORDER — EPINEPHRINE HCL IN DEXTROSE 5% 4MG/250ML
0-2 PLASTIC BAG, INJECTION (ML) INTRAVENOUS CONTINUOUS
Status: DISCONTINUED | OUTPATIENT
Start: 2024-01-01 | End: 2024-01-01

## 2024-01-01 RX ORDER — FENTANYL CITRATE-0.9 % NACL/PF 10 MCG/ML
PLASTIC BAG, INJECTION (ML) INTRAVENOUS
Status: COMPLETED
Start: 2024-01-01 | End: 2024-01-01

## 2024-01-01 RX ORDER — PHENYLEPHRINE HCL IN 0.9% NACL 0.4MG/10ML
SYRINGE (ML) INTRAVENOUS
Status: COMPLETED
Start: 2024-01-01 | End: 2024-01-01

## 2024-01-01 RX ORDER — INDOMETHACIN 25 MG/1
50 CAPSULE ORAL ONCE
Status: COMPLETED | OUTPATIENT
Start: 2024-01-01 | End: 2024-01-01

## 2024-01-01 RX ORDER — ROCURONIUM BROMIDE 10 MG/ML
80 INJECTION, SOLUTION INTRAVENOUS ONCE
Status: COMPLETED | OUTPATIENT
Start: 2024-01-01 | End: 2024-01-01

## 2024-01-01 RX ORDER — TRAZODONE HYDROCHLORIDE 50 MG/1
50 TABLET ORAL NIGHTLY PRN
COMMUNITY
Start: 2024-01-01

## 2024-01-01 RX ORDER — KETAMINE HYDROCHLORIDE 10 MG/ML
INJECTION, SOLUTION INTRAMUSCULAR; INTRAVENOUS CODE/TRAUMA/SEDATION MEDICATION
Status: COMPLETED | OUTPATIENT
Start: 2024-01-01 | End: 2024-01-01

## 2024-01-01 RX ORDER — ROCURONIUM BROMIDE 10 MG/ML
INJECTION, SOLUTION INTRAVENOUS CODE/TRAUMA/SEDATION MEDICATION
Status: COMPLETED | OUTPATIENT
Start: 2024-01-01 | End: 2024-01-01

## 2024-01-01 RX ORDER — OCTREOTIDE ACETATE 50 UG/ML
50 INJECTION, SOLUTION INTRAVENOUS; SUBCUTANEOUS ONCE
Status: DISCONTINUED | OUTPATIENT
Start: 2024-01-01 | End: 2024-01-01

## 2024-01-01 RX ORDER — LACTULOSE 10 G/15ML
20 SOLUTION ORAL
Status: CANCELLED | OUTPATIENT
Start: 2024-01-01

## 2024-01-01 RX ORDER — MAGNESIUM SULFATE 1 G/100ML
1 INJECTION INTRAVENOUS ONCE
Qty: 100 ML | Refills: 0 | Status: COMPLETED | OUTPATIENT
Start: 2024-01-01 | End: 2024-01-01

## 2024-01-01 RX ORDER — CALCIUM GLUCONATE 20 MG/ML
2 INJECTION, SOLUTION INTRAVENOUS ONCE
Status: COMPLETED | OUTPATIENT
Start: 2024-01-01 | End: 2024-01-01

## 2024-01-01 RX ORDER — LACTULOSE 10 G/15ML
20 SOLUTION ORAL
Status: DISCONTINUED | OUTPATIENT
Start: 2024-01-01 | End: 2024-01-01

## 2024-01-01 RX ORDER — GLYCOPYRROLATE 0.2 MG/ML
0.2 INJECTION INTRAMUSCULAR; INTRAVENOUS EVERY 4 HOURS PRN
Status: DISCONTINUED | OUTPATIENT
Start: 2024-01-01 | End: 2024-01-01 | Stop reason: HOSPADM

## 2024-01-01 RX ORDER — PANTOPRAZOLE SODIUM 40 MG/1
40 TABLET, DELAYED RELEASE ORAL 2 TIMES DAILY
COMMUNITY

## 2024-01-01 RX ORDER — NOREPINEPHRINE BITARTRATE/D5W 8 MG/250ML
0-3 PLASTIC BAG, INJECTION (ML) INTRAVENOUS CONTINUOUS
Status: DISCONTINUED | OUTPATIENT
Start: 2024-01-01 | End: 2024-01-01

## 2024-01-01 RX ORDER — NOREPINEPHRINE BITARTRATE 1 MG/ML
INJECTION, SOLUTION INTRAVENOUS
Status: COMPLETED
Start: 2024-01-01 | End: 2024-01-01

## 2024-01-01 RX ORDER — HALOPERIDOL 5 MG/ML
1 INJECTION INTRAMUSCULAR EVERY 4 HOURS PRN
Status: DISCONTINUED | OUTPATIENT
Start: 2024-01-01 | End: 2024-01-01 | Stop reason: HOSPADM

## 2024-01-01 RX ORDER — ROCURONIUM BROMIDE 10 MG/ML
INJECTION, SOLUTION INTRAVENOUS
Status: DISPENSED
Start: 2024-01-01 | End: 2024-01-01

## 2024-01-01 RX ORDER — LEVOTHYROXINE SODIUM ANHYDROUS 200 UG/5ML
200 INJECTION, POWDER, LYOPHILIZED, FOR SOLUTION INTRAVENOUS ONCE
Status: COMPLETED | OUTPATIENT
Start: 2024-01-01 | End: 2024-01-01

## 2024-01-01 RX ORDER — ALBUMIN HUMAN 250 G/1000ML
1 SOLUTION INTRAVENOUS ONCE
Status: DISCONTINUED | OUTPATIENT
Start: 2024-01-01 | End: 2024-01-01

## 2024-01-01 RX ORDER — BISACODYL 10 MG/1
10 SUPPOSITORY RECTAL DAILY
Status: DISCONTINUED | OUTPATIENT
Start: 2024-01-01 | End: 2024-01-01

## 2024-01-01 RX ORDER — FOLIC ACID 1 MG/1
1 TABLET ORAL DAILY
Status: CANCELLED | OUTPATIENT
Start: 2024-01-01

## 2024-01-01 RX ORDER — DEXTROSE 50 % IN WATER (D50W) INTRAVENOUS SYRINGE
25 ONCE
Status: COMPLETED | OUTPATIENT
Start: 2024-01-01 | End: 2024-01-01

## 2024-01-01 RX ORDER — PANTOPRAZOLE SODIUM 40 MG/10ML
80 INJECTION, POWDER, LYOPHILIZED, FOR SOLUTION INTRAVENOUS ONCE
Status: COMPLETED | OUTPATIENT
Start: 2024-01-01 | End: 2024-01-01

## 2024-01-01 RX ORDER — DEXTROSE, SODIUM CHLORIDE, SODIUM LACTATE, POTASSIUM CHLORIDE, AND CALCIUM CHLORIDE 5; .6; .31; .03; .02 G/100ML; G/100ML; G/100ML; G/100ML; G/100ML
75 INJECTION, SOLUTION INTRAVENOUS CONTINUOUS
Status: DISCONTINUED | OUTPATIENT
Start: 2024-01-01 | End: 2024-01-01

## 2024-01-01 RX ORDER — PROPOFOL 10 MG/ML
5-50 INJECTION, EMULSION INTRAVENOUS CONTINUOUS
Status: DISCONTINUED | OUTPATIENT
Start: 2024-01-01 | End: 2024-01-01

## 2024-01-01 RX ORDER — ROCURONIUM BROMIDE 10 MG/ML
80 INJECTION, SOLUTION INTRAVENOUS ONCE
Status: DISCONTINUED | OUTPATIENT
Start: 2024-01-01 | End: 2024-01-01

## 2024-01-01 RX ORDER — VANCOMYCIN HYDROCHLORIDE 1 G/200ML
2 INJECTION, SOLUTION INTRAVENOUS ONCE
Status: COMPLETED | OUTPATIENT
Start: 2024-01-01 | End: 2024-01-01

## 2024-01-01 RX ORDER — INSULIN LISPRO 100 [IU]/ML
0-10 INJECTION, SOLUTION INTRAVENOUS; SUBCUTANEOUS EVERY 4 HOURS
Status: DISCONTINUED | OUTPATIENT
Start: 2024-01-01 | End: 2024-01-01

## 2024-01-01 RX ORDER — POLYETHYLENE GLYCOL 3350 17 G/17G
17 POWDER, FOR SOLUTION ORAL 2 TIMES DAILY
Status: DISCONTINUED | OUTPATIENT
Start: 2024-01-01 | End: 2024-01-01

## 2024-01-01 RX ORDER — LANOLIN ALCOHOL/MO/W.PET/CERES
100 CREAM (GRAM) TOPICAL 3 TIMES DAILY
COMMUNITY
Start: 2022-10-08

## 2024-01-01 RX ORDER — PHENYLEPHRINE HCL IN 0.9% NACL 0.4MG/10ML
SYRINGE (ML) INTRAVENOUS
Status: DISPENSED
Start: 2024-01-01 | End: 2024-01-01

## 2024-01-01 RX ADMIN — PANTOPRAZOLE SODIUM 40 MG: 40 INJECTION, POWDER, FOR SOLUTION INTRAVENOUS at 20:03

## 2024-01-01 RX ADMIN — POLYETHYLENE GLYCOL 3350 17 G: 17 POWDER, FOR SOLUTION ORAL at 08:39

## 2024-01-01 RX ADMIN — POLYETHYLENE GLYCOL 3350 34 G: 17 POWDER, FOR SOLUTION ORAL at 22:02

## 2024-01-01 RX ADMIN — EPINEPHRINE 0.12 MCG/KG/MIN: 1 INJECTION INTRAMUSCULAR; INTRAVENOUS; SUBCUTANEOUS at 15:34

## 2024-01-01 RX ADMIN — MICAFUNGIN SODIUM 100 MG: 100 INJECTION, POWDER, LYOPHILIZED, FOR SOLUTION INTRAVENOUS at 00:06

## 2024-01-01 RX ADMIN — HYDROCORTISONE SODIUM SUCCINATE 50 MG: 100 INJECTION, POWDER, FOR SOLUTION INTRAMUSCULAR; INTRAVENOUS at 10:51

## 2024-01-01 RX ADMIN — OCTREOTIDE ACETATE 50 MCG/HR: 500 INJECTION, SOLUTION INTRAVENOUS; SUBCUTANEOUS at 05:39

## 2024-01-01 RX ADMIN — HYDROCORTISONE SODIUM SUCCINATE 50 MG: 100 INJECTION, POWDER, FOR SOLUTION INTRAMUSCULAR; INTRAVENOUS at 05:07

## 2024-01-01 RX ADMIN — NOREPINEPHRINE BITARTRATE 0.28 MCG/KG/MIN: 1 INJECTION, SOLUTION, CONCENTRATE INTRAVENOUS at 11:27

## 2024-01-01 RX ADMIN — VASOPRESSIN 0.01 UNITS/MIN: 0.2 INJECTION INTRAVENOUS at 15:22

## 2024-01-01 RX ADMIN — NOREPINEPHRINE BITARTRATE 0.3 MCG/KG/MIN: 8 INJECTION, SOLUTION INTRAVENOUS at 18:47

## 2024-01-01 RX ADMIN — CALCIUM CHLORIDE, MAGNESIUM CHLORIDE, DEXTROSE MONOHYDRATE, LACTIC ACID, SODIUM CHLORIDE, SODIUM BICARBONATE AND POTASSIUM CHLORIDE 2100 ML/HR: 3.68; 3.05; 22; 5.4; 6.46; 3.09; .314 INJECTION INTRAVENOUS at 18:12

## 2024-01-01 RX ADMIN — MIDAZOLAM HYDROCHLORIDE 2 MG: 1 INJECTION, SOLUTION INTRAMUSCULAR; INTRAVENOUS at 23:50

## 2024-01-01 RX ADMIN — HYDROCORTISONE SODIUM SUCCINATE 50 MG: 100 INJECTION, POWDER, FOR SOLUTION INTRAMUSCULAR; INTRAVENOUS at 22:33

## 2024-01-01 RX ADMIN — PANTOPRAZOLE SODIUM 40 MG: 40 INJECTION, POWDER, FOR SOLUTION INTRAVENOUS at 20:49

## 2024-01-01 RX ADMIN — LACTULOSE 20 G: 20 SOLUTION ORAL at 20:49

## 2024-01-01 RX ADMIN — PHYTONADIONE 10 MG: 10 INJECTION, EMULSION INTRAMUSCULAR; INTRAVENOUS; SUBCUTANEOUS at 19:30

## 2024-01-01 RX ADMIN — POLYETHYLENE GLYCOL 3350 17 G: 17 POWDER, FOR SOLUTION ORAL at 20:17

## 2024-01-01 RX ADMIN — OCTREOTIDE ACETATE 50 MCG/HR: 500 INJECTION, SOLUTION INTRAVENOUS; SUBCUTANEOUS at 21:18

## 2024-01-01 RX ADMIN — INSULIN HUMAN 2 UNITS/HR: 1 INJECTION, SOLUTION INTRAVENOUS at 17:00

## 2024-01-01 RX ADMIN — RIFAXIMIN 550 MG: 550 TABLET ORAL at 20:17

## 2024-01-01 RX ADMIN — NOREPINEPHRINE BITARTRATE 0.25 MCG/KG/MIN: 8 INJECTION, SOLUTION INTRAVENOUS at 14:00

## 2024-01-01 RX ADMIN — INSULIN LISPRO 2 UNITS: 100 INJECTION, SOLUTION INTRAVENOUS; SUBCUTANEOUS at 13:11

## 2024-01-01 RX ADMIN — INSULIN LISPRO 6 UNITS: 100 INJECTION, SOLUTION INTRAVENOUS; SUBCUTANEOUS at 11:28

## 2024-01-01 RX ADMIN — LACTULOSE 20 G: 20 SOLUTION ORAL at 14:55

## 2024-01-01 RX ADMIN — LACTULOSE 20 G: 20 SOLUTION ORAL at 02:48

## 2024-01-01 RX ADMIN — THIAMINE HYDROCHLORIDE 500 MG: 100 INJECTION, SOLUTION INTRAMUSCULAR; INTRAVENOUS at 22:00

## 2024-01-01 RX ADMIN — LACTULOSE 20 G: 20 SOLUTION ORAL at 11:26

## 2024-01-01 RX ADMIN — VANCOMYCIN HYDROCHLORIDE 500 MG: 500 INJECTION, SOLUTION INTRAVENOUS at 08:39

## 2024-01-01 RX ADMIN — PROPOFOL 25 MCG/KG/MIN: 10 INJECTION, EMULSION INTRAVENOUS at 03:00

## 2024-01-01 RX ADMIN — PIPERACILLIN SODIUM AND TAZOBACTAM SODIUM 3.38 G: 3; .375 INJECTION, SOLUTION INTRAVENOUS at 12:21

## 2024-01-01 RX ADMIN — PANTOPRAZOLE SODIUM 80 MG: 40 INJECTION, POWDER, FOR SOLUTION INTRAVENOUS at 12:02

## 2024-01-01 RX ADMIN — CALCIUM CHLORIDE, MAGNESIUM CHLORIDE, DEXTROSE MONOHYDRATE, LACTIC ACID, SODIUM CHLORIDE, SODIUM BICARBONATE AND POTASSIUM CHLORIDE 2100 ML/HR: 3.68; 3.05; 22; 5.4; 6.46; 3.09; .314 INJECTION INTRAVENOUS at 19:39

## 2024-01-01 RX ADMIN — HYDROCORTISONE SODIUM SUCCINATE 50 MG: 100 INJECTION, POWDER, FOR SOLUTION INTRAMUSCULAR; INTRAVENOUS at 11:14

## 2024-01-01 RX ADMIN — LACTULOSE 20 G: 20 SOLUTION ORAL at 19:30

## 2024-01-01 RX ADMIN — TOBRAMYCIN SULFATE 460 MG: 40 INJECTION, SOLUTION INTRAMUSCULAR; INTRAVENOUS at 10:46

## 2024-01-01 RX ADMIN — Medication 2 G: at 02:00

## 2024-01-01 RX ADMIN — CALCIUM GLUCONATE 2 G: 20 INJECTION, SOLUTION INTRAVENOUS at 10:09

## 2024-01-01 RX ADMIN — INSULIN LISPRO 4 UNITS: 100 INJECTION, SOLUTION INTRAVENOUS; SUBCUTANEOUS at 08:20

## 2024-01-01 RX ADMIN — PANTOPRAZOLE SODIUM 40 MG: 40 INJECTION, POWDER, FOR SOLUTION INTRAVENOUS at 20:17

## 2024-01-01 RX ADMIN — VASOPRESSIN 0.03 UNITS/MIN: 0.2 INJECTION INTRAVENOUS at 19:00

## 2024-01-01 RX ADMIN — FOLIC ACID 1 MG: 1 TABLET ORAL at 08:43

## 2024-01-01 RX ADMIN — Medication 100 PERCENT: at 13:25

## 2024-01-01 RX ADMIN — ALBUMIN HUMAN 75 G: 0.25 SOLUTION INTRAVENOUS at 12:42

## 2024-01-01 RX ADMIN — BISACODYL 10 MG: 10 SUPPOSITORY RECTAL at 20:57

## 2024-01-01 RX ADMIN — INSULIN LISPRO 2 UNITS: 100 INJECTION, SOLUTION INTRAVENOUS; SUBCUTANEOUS at 03:27

## 2024-01-01 RX ADMIN — LACTULOSE 20 G: 20 SOLUTION ORAL at 22:28

## 2024-01-01 RX ADMIN — VANCOMYCIN HYDROCHLORIDE 750 MG: 750 INJECTION, SOLUTION INTRAVENOUS at 08:43

## 2024-01-01 RX ADMIN — EPOPROSTENOL 0.01 MCG/KG/MIN: 1.5 INJECTION, POWDER, LYOPHILIZED, FOR SOLUTION INTRAVENOUS at 09:29

## 2024-01-01 RX ADMIN — SODIUM BICARBONATE 50 MEQ: 84 INJECTION, SOLUTION INTRAVENOUS at 18:48

## 2024-01-01 RX ADMIN — FOLIC ACID 1 MG: 1 TABLET ORAL at 19:27

## 2024-01-01 RX ADMIN — PIPERACILLIN SODIUM AND TAZOBACTAM SODIUM 3.38 G: 3; .375 INJECTION, SOLUTION INTRAVENOUS at 23:00

## 2024-01-01 RX ADMIN — MIDAZOLAM HYDROCHLORIDE 2 MG: 1 INJECTION, SOLUTION INTRAMUSCULAR; INTRAVENOUS at 20:17

## 2024-01-01 RX ADMIN — HYDROCORTISONE SODIUM SUCCINATE 100 MG: 100 INJECTION, POWDER, FOR SOLUTION INTRAMUSCULAR; INTRAVENOUS at 15:20

## 2024-01-01 RX ADMIN — VANCOMYCIN HYDROCHLORIDE 1500 MG: 5 INJECTION, POWDER, LYOPHILIZED, FOR SOLUTION INTRAVENOUS at 22:00

## 2024-01-01 RX ADMIN — CALCIUM CHLORIDE, MAGNESIUM CHLORIDE, DEXTROSE MONOHYDRATE, LACTIC ACID, SODIUM CHLORIDE, SODIUM BICARBONATE AND POTASSIUM CHLORIDE 2100 ML/HR: 3.68; 3.05; 22; 5.4; 6.46; 3.09; .314 INJECTION INTRAVENOUS at 10:35

## 2024-01-01 RX ADMIN — CALCIUM CHLORIDE, MAGNESIUM CHLORIDE, DEXTROSE MONOHYDRATE, LACTIC ACID, SODIUM CHLORIDE, SODIUM BICARBONATE AND POTASSIUM CHLORIDE 2100 ML/HR: 3.68; 3.05; 22; 5.4; 6.46; 3.09; .314 INJECTION INTRAVENOUS at 10:32

## 2024-01-01 RX ADMIN — DEXTROSE 50 % IN WATER (D50W) INTRAVENOUS SYRINGE 25 G: at 11:10

## 2024-01-01 RX ADMIN — CALCIUM CHLORIDE, MAGNESIUM CHLORIDE, DEXTROSE MONOHYDRATE, LACTIC ACID, SODIUM CHLORIDE, SODIUM BICARBONATE AND POTASSIUM CHLORIDE 2100 ML/HR: 3.68; 3.05; 22; 5.4; 6.46; 3.09; .314 INJECTION INTRAVENOUS at 01:33

## 2024-01-01 RX ADMIN — ANGIOTENSIN II 40 NG/KG/MIN: 2.5 INJECTION INTRAVENOUS at 13:00

## 2024-01-01 RX ADMIN — HYDROCORTISONE SODIUM SUCCINATE 50 MG: 100 INJECTION, POWDER, FOR SOLUTION INTRAMUSCULAR; INTRAVENOUS at 22:29

## 2024-01-01 RX ADMIN — INSULIN LISPRO 2 UNITS: 100 INJECTION, SOLUTION INTRAVENOUS; SUBCUTANEOUS at 08:40

## 2024-01-01 RX ADMIN — MICAFUNGIN SODIUM 100 MG: 100 INJECTION, POWDER, LYOPHILIZED, FOR SOLUTION INTRAVENOUS at 00:00

## 2024-01-01 RX ADMIN — THIAMINE HYDROCHLORIDE 500 MG: 100 INJECTION, SOLUTION INTRAMUSCULAR; INTRAVENOUS at 21:19

## 2024-01-01 RX ADMIN — CALCIUM CHLORIDE, MAGNESIUM CHLORIDE, DEXTROSE MONOHYDRATE, LACTIC ACID, SODIUM CHLORIDE, SODIUM BICARBONATE AND POTASSIUM CHLORIDE 2100 ML/HR: 3.68; 3.05; 22; 5.4; 6.46; 3.09; .314 INJECTION INTRAVENOUS at 10:34

## 2024-01-01 RX ADMIN — SODIUM BICARBONATE 125 ML/HR: 84 INJECTION, SOLUTION INTRAVENOUS at 09:30

## 2024-01-01 RX ADMIN — SODIUM BICARBONATE 50 MEQ: 84 INJECTION, SOLUTION INTRAVENOUS at 09:01

## 2024-01-01 RX ADMIN — MAGNESIUM SULFATE HEPTAHYDRATE 1 G: 1 INJECTION, SOLUTION INTRAVENOUS at 10:00

## 2024-01-01 RX ADMIN — THIAMINE HYDROCHLORIDE 500 MG: 100 INJECTION, SOLUTION INTRAMUSCULAR; INTRAVENOUS at 14:56

## 2024-01-01 RX ADMIN — VANCOMYCIN HYDROCHLORIDE 500 MG: 500 INJECTION, SOLUTION INTRAVENOUS at 20:17

## 2024-01-01 RX ADMIN — VASOPRESSIN 0.03 UNITS/MIN: 0.2 INJECTION INTRAVENOUS at 17:30

## 2024-01-01 RX ADMIN — LEVOTHYROXINE SODIUM 88 MCG: 0.09 TABLET ORAL at 04:18

## 2024-01-01 RX ADMIN — Medication 0.4 MG: at 13:28

## 2024-01-01 RX ADMIN — VASOPRESSIN 0.03 UNITS/MIN: 0.2 INJECTION INTRAVENOUS at 10:51

## 2024-01-01 RX ADMIN — PANTOPRAZOLE SODIUM 40 MG: 40 INJECTION, POWDER, FOR SOLUTION INTRAVENOUS at 08:43

## 2024-01-01 RX ADMIN — LACTULOSE 20 G: 20 SOLUTION ORAL at 15:51

## 2024-01-01 RX ADMIN — SODIUM CHLORIDE, POTASSIUM CHLORIDE, SODIUM LACTATE AND CALCIUM CHLORIDE 1000 ML: 600; 310; 30; 20 INJECTION, SOLUTION INTRAVENOUS at 11:11

## 2024-01-01 RX ADMIN — PIPERACILLIN SODIUM AND TAZOBACTAM SODIUM 2.25 G: 2; .25 INJECTION, SOLUTION INTRAVENOUS at 11:30

## 2024-01-01 RX ADMIN — NOREPINEPHRINE BITARTRATE 0.31 MCG/KG/MIN: 8 INJECTION, SOLUTION INTRAVENOUS at 22:00

## 2024-01-01 RX ADMIN — PROPOFOL 15 MCG/KG/MIN: 10 INJECTION, EMULSION INTRAVENOUS at 08:00

## 2024-01-01 RX ADMIN — LACTULOSE 20 G: 20 SOLUTION ORAL at 03:25

## 2024-01-01 RX ADMIN — LACTULOSE 20 G: 20 SOLUTION ORAL at 08:43

## 2024-01-01 RX ADMIN — LACTULOSE 20 G: 20 SOLUTION ORAL at 00:52

## 2024-01-01 RX ADMIN — ROCURONIUM 80 MG: 50 INJECTION, SOLUTION INTRAVENOUS at 13:11

## 2024-01-01 RX ADMIN — THIAMINE HYDROCHLORIDE 500 MG: 100 INJECTION, SOLUTION INTRAMUSCULAR; INTRAVENOUS at 05:39

## 2024-01-01 RX ADMIN — THIAMINE HYDROCHLORIDE 500 MG: 100 INJECTION, SOLUTION INTRAMUSCULAR; INTRAVENOUS at 15:02

## 2024-01-01 RX ADMIN — ALBUMIN HUMAN 75 G: 250 SOLUTION INTRAVENOUS at 10:05

## 2024-01-01 RX ADMIN — HYDROCORTISONE SODIUM SUCCINATE 50 MG: 100 INJECTION, POWDER, FOR SOLUTION INTRAMUSCULAR; INTRAVENOUS at 04:19

## 2024-01-01 RX ADMIN — LACTULOSE 20 G: 20 SOLUTION ORAL at 23:36

## 2024-01-01 RX ADMIN — LACTULOSE 20 G: 20 SOLUTION ORAL at 12:21

## 2024-01-01 RX ADMIN — MIDAZOLAM HYDROCHLORIDE 2 MG: 1 INJECTION INTRAMUSCULAR; INTRAVENOUS at 23:50

## 2024-01-01 RX ADMIN — EPOPROSTENOL 0.01 MCG/KG/MIN: 1.5 INJECTION, POWDER, LYOPHILIZED, FOR SOLUTION INTRAVENOUS at 05:34

## 2024-01-01 RX ADMIN — DEXTROSE AND SODIUM CHLORIDE 200 ML/HR: 5; 900 INJECTION, SOLUTION INTRAVENOUS at 11:11

## 2024-01-01 RX ADMIN — CALCIUM CHLORIDE, MAGNESIUM CHLORIDE, DEXTROSE MONOHYDRATE, LACTIC ACID, SODIUM CHLORIDE, SODIUM BICARBONATE AND POTASSIUM CHLORIDE 2100 ML/HR: 3.68; 3.05; 22; 5.4; 6.46; 3.09; .314 INJECTION INTRAVENOUS at 18:05

## 2024-01-01 RX ADMIN — DEXTROSE MONOHYDRATE 25 G: 25 INJECTION, SOLUTION INTRAVENOUS at 11:10

## 2024-01-01 RX ADMIN — NOREPINEPHRINE BITARTRATE 0.27 MCG/KG/MIN: 8 INJECTION, SOLUTION INTRAVENOUS at 05:00

## 2024-01-01 RX ADMIN — PANTOPRAZOLE SODIUM 40 MG: 40 INJECTION, POWDER, FOR SOLUTION INTRAVENOUS at 08:21

## 2024-01-01 RX ADMIN — PROPOFOL 10 MCG/KG/MIN: 10 INJECTION, EMULSION INTRAVENOUS at 13:00

## 2024-01-01 RX ADMIN — BISACODYL 10 MG: 10 SUPPOSITORY RECTAL at 11:18

## 2024-01-01 RX ADMIN — PIPERACILLIN SODIUM AND TAZOBACTAM SODIUM 2.25 G: 2; .25 INJECTION, SOLUTION INTRAVENOUS at 18:17

## 2024-01-01 RX ADMIN — NOREPINEPHRINE BITARTRATE 0.3 MCG/KG/MIN: 8 INJECTION, SOLUTION INTRAVENOUS at 13:30

## 2024-01-01 RX ADMIN — EPOPROSTENOL 0.01 MCG/KG/MIN: 1.5 INJECTION, POWDER, LYOPHILIZED, FOR SOLUTION INTRAVENOUS at 13:47

## 2024-01-01 RX ADMIN — RIFAXIMIN 550 MG: 550 TABLET ORAL at 20:03

## 2024-01-01 RX ADMIN — Medication 75 MCG/HR: at 05:00

## 2024-01-01 RX ADMIN — EPINEPHRINE 0.02 MCG/KG/MIN: 1 INJECTION INTRAMUSCULAR; INTRAVENOUS; SUBCUTANEOUS at 17:30

## 2024-01-01 RX ADMIN — RIFAXIMIN 550 MG: 550 TABLET ORAL at 08:43

## 2024-01-01 RX ADMIN — PROPOFOL 15 MCG/KG/MIN: 10 INJECTION, EMULSION INTRAVENOUS at 18:00

## 2024-01-01 RX ADMIN — MEROPENEM 2 G: 1 INJECTION, POWDER, FOR SOLUTION INTRAVENOUS at 09:40

## 2024-01-01 RX ADMIN — LEVOTHYROXINE SODIUM 88 MCG: 0.09 TABLET ORAL at 05:11

## 2024-01-01 RX ADMIN — INSULIN LISPRO 2 UNITS: 100 INJECTION, SOLUTION INTRAVENOUS; SUBCUTANEOUS at 16:25

## 2024-01-01 RX ADMIN — FOLIC ACID 1 MG: 1 TABLET ORAL at 08:20

## 2024-01-01 RX ADMIN — PIPERACILLIN SODIUM AND TAZOBACTAM SODIUM 2.25 G: 2; .25 INJECTION, SOLUTION INTRAVENOUS at 05:07

## 2024-01-01 RX ADMIN — LACTULOSE 20 G: 20 SOLUTION ORAL at 22:33

## 2024-01-01 RX ADMIN — ROCURONIUM BROMIDE 80 MG: 10 INJECTION INTRAVENOUS at 13:26

## 2024-01-01 RX ADMIN — NOREPINEPHRINE BITARTRATE 0.1 MCG/KG/MIN: 8 INJECTION, SOLUTION INTRAVENOUS at 05:39

## 2024-01-01 RX ADMIN — RIFAXIMIN 550 MG: 550 TABLET ORAL at 20:49

## 2024-01-01 RX ADMIN — VASOPRESSIN 0.03 UNITS/MIN: 0.2 INJECTION INTRAVENOUS at 14:00

## 2024-01-01 RX ADMIN — LACTULOSE 20 G: 20 SOLUTION ORAL at 20:03

## 2024-01-01 RX ADMIN — AZITHROMYCIN 500 MG: 500 INJECTION, POWDER, LYOPHILIZED, FOR SOLUTION INTRAVENOUS at 23:21

## 2024-01-01 RX ADMIN — NOREPINEPHRINE BITARTRATE 0.28 MCG/KG/MIN: 8 INJECTION, SOLUTION INTRAVENOUS at 10:51

## 2024-01-01 RX ADMIN — NOREPINEPHRINE BITARTRATE 0.34 MCG/KG/MIN: 1 INJECTION, SOLUTION, CONCENTRATE INTRAVENOUS at 04:00

## 2024-01-01 RX ADMIN — LACTULOSE 20 G: 20 SOLUTION ORAL at 18:08

## 2024-01-01 RX ADMIN — LACTULOSE 20 G: 20 SOLUTION ORAL at 05:07

## 2024-01-01 RX ADMIN — LEVOTHYROXINE SODIUM ANHYDROUS 200 MCG: 200 INJECTION, POWDER, LYOPHILIZED, FOR SOLUTION INTRAVENOUS at 15:22

## 2024-01-01 RX ADMIN — PROPOFOL 25 MCG/KG/MIN: 10 INJECTION, EMULSION INTRAVENOUS at 20:00

## 2024-01-01 RX ADMIN — VANCOMYCIN HYDROCHLORIDE 2 G: 1 INJECTION, SOLUTION INTRAVENOUS at 12:03

## 2024-01-01 RX ADMIN — MIDAZOLAM HYDROCHLORIDE 2 MG: 1 INJECTION, SOLUTION INTRAMUSCULAR; INTRAVENOUS at 13:11

## 2024-01-01 RX ADMIN — Medication 75 MCG/HR: at 16:00

## 2024-01-01 RX ADMIN — Medication 50 MCG/HR: at 07:42

## 2024-01-01 RX ADMIN — INSULIN LISPRO 2 UNITS: 100 INJECTION, SOLUTION INTRAVENOUS; SUBCUTANEOUS at 12:21

## 2024-01-01 RX ADMIN — VASOPRESSIN 0.03 UNITS/MIN: 0.2 INJECTION INTRAVENOUS at 05:39

## 2024-01-01 RX ADMIN — Medication 25 MCG/HR: at 04:00

## 2024-01-01 RX ADMIN — ALBUMIN HUMAN 50 G: 0.25 SOLUTION INTRAVENOUS at 12:47

## 2024-01-01 RX ADMIN — LACTULOSE 20 G: 20 SOLUTION ORAL at 18:09

## 2024-01-01 RX ADMIN — MEROPENEM 2 G: 1 INJECTION, POWDER, FOR SOLUTION INTRAVENOUS at 21:22

## 2024-01-01 RX ADMIN — THIAMINE HYDROCHLORIDE 500 MG: 100 INJECTION, SOLUTION INTRAMUSCULAR; INTRAVENOUS at 05:00

## 2024-01-01 RX ADMIN — AZITHROMYCIN DIHYDRATE 500 MG: 500 TABLET, FILM COATED ORAL at 21:31

## 2024-01-01 RX ADMIN — HYDROCORTISONE SODIUM SUCCINATE 50 MG: 100 INJECTION, POWDER, FOR SOLUTION INTRAMUSCULAR; INTRAVENOUS at 23:01

## 2024-01-01 RX ADMIN — PIPERACILLIN SODIUM AND TAZOBACTAM SODIUM 2.25 G: 2; .25 INJECTION, SOLUTION INTRAVENOUS at 22:33

## 2024-01-01 RX ADMIN — THIAMINE HYDROCHLORIDE 500 MG: 100 INJECTION, SOLUTION INTRAMUSCULAR; INTRAVENOUS at 15:22

## 2024-01-01 RX ADMIN — VASOPRESSIN 0.03 UNITS/MIN: 0.2 INJECTION INTRAVENOUS at 07:19

## 2024-01-01 RX ADMIN — ROCURONIUM BROMIDE 80 MG: 10 INJECTION, SOLUTION INTRAVENOUS at 13:26

## 2024-01-01 RX ADMIN — NOREPINEPHRINE BITARTRATE 0.25 MCG/KG/MIN: 8 INJECTION, SOLUTION INTRAVENOUS at 18:16

## 2024-01-01 RX ADMIN — HYDROCORTISONE SODIUM SUCCINATE 50 MG: 100 INJECTION, POWDER, FOR SOLUTION INTRAMUSCULAR; INTRAVENOUS at 05:10

## 2024-01-01 RX ADMIN — HYDROCORTISONE SODIUM SUCCINATE 50 MG: 100 INJECTION, POWDER, FOR SOLUTION INTRAMUSCULAR; INTRAVENOUS at 11:26

## 2024-01-01 RX ADMIN — HYDROCORTISONE SODIUM SUCCINATE 50 MG: 100 INJECTION, POWDER, FOR SOLUTION INTRAMUSCULAR; INTRAVENOUS at 17:09

## 2024-01-01 RX ADMIN — HYDROCORTISONE SODIUM SUCCINATE 50 MG: 100 INJECTION, POWDER, FOR SOLUTION INTRAMUSCULAR; INTRAVENOUS at 17:00

## 2024-01-01 RX ADMIN — HYDROCORTISONE SODIUM SUCCINATE 50 MG: 100 INJECTION, POWDER, FOR SOLUTION INTRAMUSCULAR; INTRAVENOUS at 18:17

## 2024-01-01 RX ADMIN — LACTULOSE 20 G: 20 SOLUTION ORAL at 10:51

## 2024-01-01 RX ADMIN — PROPOFOL 15 MCG/KG/MIN: 10 INJECTION, EMULSION INTRAVENOUS at 00:00

## 2024-01-01 RX ADMIN — PIPERACILLIN SODIUM AND TAZOBACTAM SODIUM 3.38 G: 3; .375 INJECTION, SOLUTION INTRAVENOUS at 05:00

## 2024-01-01 RX ADMIN — FOLIC ACID 1 MG: 1 TABLET ORAL at 08:38

## 2024-01-01 RX ADMIN — ALBUMIN HUMAN 75 G: 0.25 SOLUTION INTRAVENOUS at 10:05

## 2024-01-01 RX ADMIN — PANTOPRAZOLE SODIUM 40 MG: 40 INJECTION, POWDER, FOR SOLUTION INTRAVENOUS at 20:07

## 2024-01-01 RX ADMIN — BISACODYL 10 MG: 10 SUPPOSITORY RECTAL at 08:38

## 2024-01-01 RX ADMIN — PANTOPRAZOLE SODIUM 40 MG: 40 INJECTION, POWDER, FOR SOLUTION INTRAVENOUS at 09:42

## 2024-01-01 RX ADMIN — Medication 100 MG: at 13:28

## 2024-01-01 RX ADMIN — INDOMETHACIN 50 MEQ: 25 CAPSULE ORAL at 09:01

## 2024-01-01 RX ADMIN — LACTULOSE 20 G: 20 SOLUTION ORAL at 06:33

## 2024-01-01 RX ADMIN — HYDROCORTISONE SODIUM SUCCINATE 50 MG: 100 INJECTION, POWDER, FOR SOLUTION INTRAMUSCULAR; INTRAVENOUS at 23:50

## 2024-01-01 RX ADMIN — TOBRAMYCIN SULFATE 216 MG: 40 INJECTION, SOLUTION INTRAMUSCULAR; INTRAVENOUS at 21:00

## 2024-01-01 RX ADMIN — PROPOFOL 25 MCG/KG/MIN: 10 INJECTION, EMULSION INTRAVENOUS at 10:00

## 2024-01-01 RX ADMIN — KETAMINE HYDROCHLORIDE 100 MG: 10 INJECTION, SOLUTION INTRAMUSCULAR; INTRAVENOUS at 13:09

## 2024-01-01 RX ADMIN — LACTULOSE 20 G: 20 SOLUTION ORAL at 05:11

## 2024-01-01 RX ADMIN — ANGIOTENSIN II 20 NG/KG/MIN: 2.5 INJECTION INTRAVENOUS at 09:45

## 2024-01-01 RX ADMIN — EPINEPHRINE 0.06 MCG/KG/MIN: 1 INJECTION INTRAMUSCULAR; INTRAVENOUS; SUBCUTANEOUS at 04:00

## 2024-01-01 RX ADMIN — THIAMINE HYDROCHLORIDE 500 MG: 100 INJECTION, SOLUTION INTRAMUSCULAR; INTRAVENOUS at 06:00

## 2024-01-01 RX ADMIN — CALCIUM CHLORIDE, MAGNESIUM CHLORIDE, DEXTROSE MONOHYDRATE, LACTIC ACID, SODIUM CHLORIDE, SODIUM BICARBONATE AND POTASSIUM CHLORIDE 2100 ML/HR: 3.68; 3.05; 22; 5.4; 6.46; 3.09; .314 INJECTION INTRAVENOUS at 14:30

## 2024-01-01 RX ADMIN — RIFAXIMIN 550 MG: 550 TABLET ORAL at 08:20

## 2024-01-01 RX ADMIN — VANCOMYCIN HYDROCHLORIDE 750 MG: 750 INJECTION, SOLUTION INTRAVENOUS at 19:00

## 2024-01-01 RX ADMIN — RIFAXIMIN 550 MG: 550 TABLET ORAL at 08:38

## 2024-01-01 RX ADMIN — PHYTONADIONE 10 MG: 10 INJECTION, EMULSION INTRAMUSCULAR; INTRAVENOUS; SUBCUTANEOUS at 11:00

## 2024-01-01 RX ADMIN — LACTULOSE 20 G: 20 SOLUTION ORAL at 08:23

## 2024-01-01 RX ADMIN — MEROPENEM 2000 MG: 1 INJECTION, POWDER, FOR SOLUTION INTRAVENOUS at 16:13

## 2024-01-01 RX ADMIN — NOREPINEPHRINE BITARTRATE: 1 INJECTION INTRAVENOUS at 15:00

## 2024-01-01 RX ADMIN — INSULIN LISPRO 2 UNITS: 100 INJECTION, SOLUTION INTRAVENOUS; SUBCUTANEOUS at 00:15

## 2024-01-01 RX ADMIN — NOREPINEPHRINE BITARTRATE 0.36 MCG/KG/MIN: 1 INJECTION, SOLUTION, CONCENTRATE INTRAVENOUS at 00:05

## 2024-01-01 RX ADMIN — OCTREOTIDE ACETATE 50 MCG/HR: 500 INJECTION, SOLUTION INTRAVENOUS; SUBCUTANEOUS at 16:00

## 2024-01-01 RX ADMIN — PIPERACILLIN SODIUM AND TAZOBACTAM SODIUM 3.38 G: 3; .375 INJECTION, SOLUTION INTRAVENOUS at 17:53

## 2024-01-01 RX ADMIN — PROPOFOL 15 MCG/KG/MIN: 10 INJECTION, EMULSION INTRAVENOUS at 05:40

## 2024-01-01 RX ADMIN — SODIUM CHLORIDE, POTASSIUM CHLORIDE, SODIUM LACTATE AND CALCIUM CHLORIDE 1000 ML: 600; 310; 30; 20 INJECTION, SOLUTION INTRAVENOUS at 18:47

## 2024-01-01 RX ADMIN — PROPOFOL 25 MCG/KG/MIN: 10 INJECTION, EMULSION INTRAVENOUS at 14:00

## 2024-01-01 RX ADMIN — HYDROCORTISONE SODIUM SUCCINATE 50 MG: 100 INJECTION, POWDER, FOR SOLUTION INTRAMUSCULAR; INTRAVENOUS at 17:01

## 2024-01-01 RX ADMIN — VASOPRESSIN 0.03 UNITS/MIN: 0.2 INJECTION INTRAVENOUS at 22:00

## 2024-01-01 RX ADMIN — CALCIUM CHLORIDE, MAGNESIUM CHLORIDE, DEXTROSE MONOHYDRATE, LACTIC ACID, SODIUM CHLORIDE, SODIUM BICARBONATE AND POTASSIUM CHLORIDE 2100 ML/HR: 3.68; 3.05; 22; 5.4; 6.46; 3.09; .314 INJECTION INTRAVENOUS at 18:11

## 2024-01-01 RX ADMIN — PIPERACILLIN SODIUM AND TAZOBACTAM SODIUM 4.5 G: 4; .5 INJECTION, SOLUTION INTRAVENOUS at 12:02

## 2024-01-01 RX ADMIN — OCTREOTIDE ACETATE 50 MCG/HR: 500 INJECTION, SOLUTION INTRAVENOUS; SUBCUTANEOUS at 03:00

## 2024-01-01 SDOH — SOCIAL STABILITY: SOCIAL INSECURITY: DOES ANYONE TRY TO KEEP YOU FROM HAVING/CONTACTING OTHER FRIENDS OR DOING THINGS OUTSIDE YOUR HOME?: UNABLE TO ASSESS

## 2024-01-01 SDOH — ECONOMIC STABILITY: HOUSING INSECURITY: IN THE LAST 12 MONTHS, HOW MANY PLACES HAVE YOU LIVED?: 1

## 2024-01-01 SDOH — SOCIAL STABILITY: SOCIAL INSECURITY: HAS ANYONE EVER THREATENED TO HURT YOUR FAMILY OR YOUR PETS?: UNABLE TO ASSESS

## 2024-01-01 SDOH — SOCIAL STABILITY: SOCIAL INSECURITY: HAVE YOU HAD THOUGHTS OF HARMING ANYONE ELSE?: UNABLE TO ASSESS

## 2024-01-01 SDOH — SOCIAL STABILITY: SOCIAL INSECURITY: ARE YOU OR HAVE YOU BEEN THREATENED OR ABUSED PHYSICALLY, EMOTIONALLY, OR SEXUALLY BY ANYONE?: UNABLE TO ASSESS

## 2024-01-01 SDOH — SOCIAL STABILITY: SOCIAL INSECURITY: ARE THERE ANY APPARENT SIGNS OF INJURIES/BEHAVIORS THAT COULD BE RELATED TO ABUSE/NEGLECT?: UNABLE TO ASSESS

## 2024-01-01 SDOH — HEALTH STABILITY: PHYSICAL HEALTH: ON AVERAGE, HOW MANY MINUTES DO YOU ENGAGE IN EXERCISE AT THIS LEVEL?: PATIENT UNABLE TO ANSWER

## 2024-01-01 SDOH — SOCIAL STABILITY: SOCIAL INSECURITY: ABUSE: ADULT

## 2024-01-01 SDOH — HEALTH STABILITY: PHYSICAL HEALTH
ON AVERAGE, HOW MANY DAYS PER WEEK DO YOU ENGAGE IN MODERATE TO STRENUOUS EXERCISE (LIKE A BRISK WALK)?: PATIENT UNABLE TO ANSWER

## 2024-01-01 SDOH — SOCIAL STABILITY: SOCIAL INSECURITY: DO YOU FEEL UNSAFE GOING BACK TO THE PLACE WHERE YOU ARE LIVING?: UNABLE TO ASSESS

## 2024-01-01 SDOH — SOCIAL STABILITY: SOCIAL INSECURITY: DO YOU FEEL ANYONE HAS EXPLOITED OR TAKEN ADVANTAGE OF YOU FINANCIALLY OR OF YOUR PERSONAL PROPERTY?: UNABLE TO ASSESS

## 2024-01-01 ASSESSMENT — COGNITIVE AND FUNCTIONAL STATUS - GENERAL: PATIENT BASELINE BEDBOUND: UNABLE TO ASSESS AT THIS TIME

## 2024-01-01 ASSESSMENT — ACTIVITIES OF DAILY LIVING (ADL)
LACK_OF_TRANSPORTATION: PATIENT UNABLE TO ANSWER
JUDGMENT_ADEQUATE_SAFELY_COMPLETE_DAILY_ACTIVITIES: UNABLE TO ASSESS
PATIENT'S MEMORY ADEQUATE TO SAFELY COMPLETE DAILY ACTIVITIES?: UNABLE TO ASSESS
HEARING - RIGHT EAR: UNABLE TO ASSESS
TOILETING: UNABLE TO ASSESS
BATHING: UNABLE TO ASSESS
WALKS IN HOME: UNABLE TO ASSESS
HEARING - LEFT EAR: UNABLE TO ASSESS
DRESSING YOURSELF: UNABLE TO ASSESS
GROOMING: UNABLE TO ASSESS
ADEQUATE_TO_COMPLETE_ADL: UNABLE TO ASSESS
FEEDING YOURSELF: UNABLE TO ASSESS

## 2024-01-01 ASSESSMENT — PAIN - FUNCTIONAL ASSESSMENT

## 2024-01-01 ASSESSMENT — LIFESTYLE VARIABLES
AUDIT-C TOTAL SCORE: -1
AUDIT-C TOTAL SCORE: -1
HOW OFTEN DO YOU HAVE 6 OR MORE DRINKS ON ONE OCCASION: PATIENT UNABLE TO ANSWER
HOW MANY STANDARD DRINKS CONTAINING ALCOHOL DO YOU HAVE ON A TYPICAL DAY: PATIENT UNABLE TO ANSWER
HOW OFTEN DO YOU HAVE A DRINK CONTAINING ALCOHOL: PATIENT UNABLE TO ANSWER
SKIP TO QUESTIONS 9-10: 0

## 2024-01-01 ASSESSMENT — COLUMBIA-SUICIDE SEVERITY RATING SCALE - C-SSRS
1. IN THE PAST MONTH, HAVE YOU WISHED YOU WERE DEAD OR WISHED YOU COULD GO TO SLEEP AND NOT WAKE UP?: NO
6. HAVE YOU EVER DONE ANYTHING, STARTED TO DO ANYTHING, OR PREPARED TO DO ANYTHING TO END YOUR LIFE?: NO
2. HAVE YOU ACTUALLY HAD ANY THOUGHTS OF KILLING YOURSELF?: NO

## 2024-01-01 ASSESSMENT — PATIENT HEALTH QUESTIONNAIRE - PHQ9
SUM OF ALL RESPONSES TO PHQ9 QUESTIONS 1 & 2: 0
2. FEELING DOWN, DEPRESSED OR HOPELESS: NOT AT ALL
1. LITTLE INTEREST OR PLEASURE IN DOING THINGS: NOT AT ALL

## 2024-04-09 PROBLEM — E16.2 HYPOGLYCEMIA: Status: ACTIVE | Noted: 2024-01-01

## 2024-04-09 NOTE — ED PROCEDURE NOTE
Procedure  Intubation    Performed by: Perri Watts MD  Authorized by: Jayden Rojas DO    Consent:     Consent obtained:  Emergent situation and verbal    Consent given by: brittny.    Risks discussed:  Aspiration, bleeding, death, brain injury, dental trauma, hypoxia, pneumothorax and laryngeal injury    Alternatives discussed:  Referral, observation, alternative treatment, delayed treatment and no treatment  Universal protocol:     Procedure explained and questions answered to patient or proxy's satisfaction: yes      Imaging studies available: yes      Immediately prior to procedure, a time out was called: yes      Patient identity confirmed:  Arm band  Pre-procedure details:     Indications: airway protection, altered consciousness and respiratory failure      Patient status:  Altered mental status    Look externally: large incisors      Obstruction: none      Neck mobility: normal      Pharmacologic strategy: RSI      Induction agents:  Ketamine    Paralytics:  Rocuronium  Procedure details:     Preoxygenation:  Nonrebreather mask    Number of attempts:  1  Successful intubation attempt details:     Intubation method:  Oral    Intubation technique: video assisted      Laryngoscope blade:  Mac 3    Bougie used: no      Grade view: II      Tube size (mm):  7.5    Tube type:  Cuffed    Tube visualized through cords: yes    Placement assessment:     ETT at teeth/gumline (cm):  23    Tube secured with:  ETT lynn    Breath sounds:  Equal    Placement verification: chest rise, colorimetric ETCO2, direct visualization, equal breath sounds, numeric ETCO2, tube exhalation and waveform ETCO2    Post-procedure details:     Procedure completion:  Tolerated well, no immediate complications               Perri Watts MD  Resident  04/09/24 9909

## 2024-04-09 NOTE — ED TRIAGE NOTES
Pt to ED with c/o unresponsive and lethargic since yesterday at home. Per , pt was recently discharged out of hospital for a CHF flare up. BG 50, BP 90/60 and .     Upon arrival, pt alert, moving all extremities. BG 30.

## 2024-04-09 NOTE — ED PROCEDURE NOTE
Procedure  Insert arterial line    Performed by: Lane Reeder DO  Authorized by: Jayden oRjas DO    Consent:     Consent obtained:  Written    Consent given by:  Spouse    Risks, benefits, and alternatives were discussed: yes      Risks discussed:  Bleeding, ischemia, repeat procedure, infection and pain  Universal protocol:     Procedure explained and questions answered to patient or proxy's satisfaction: yes      Patient identity confirmed:  Arm band and hospital-assigned identification number  Indications:     Indications: hemodynamic monitoring and multiple ABGs    Pre-procedure details:     Skin preparation:  Chlorhexidine  Sedation:     Sedation type: intubated.  Anesthesia:     Anesthesia method:  None  Procedure details:     Location:  L radial    Juan A's test performed: no      Needle gauge:  20 G    Placement technique:  Ultrasound guided    Number of attempts:  1    Transducer: waveform confirmed    Post-procedure details:     Post-procedure:  Secured with tape, sterile dressing applied and sutured    CMS:  Normal    Procedure completion:  Tolerated               Lane Reeder DO  Resident  04/09/24 1743

## 2024-04-09 NOTE — ED PROCEDURE NOTE
Procedure  Central Line    Performed by: Perri Watts MD  Authorized by: Jayden Rojas DO    Consent:     Consent obtained:  Written    Consent given by:  Spouse    Risks, benefits, and alternatives were discussed: yes      Risks discussed:  Bleeding, arterial puncture, incorrect placement and infection    Alternatives discussed:  No treatment, delayed treatment, alternative treatment and observation  Universal protocol:     Procedure explained and questions answered to patient or proxy's satisfaction: yes      Relevant documents present and verified: yes      Test results available: yes      Imaging studies available: yes      Site/side marked: yes      Immediately prior to procedure, a time out was called: yes      Patient identity confirmed:  Hospital-assigned identification number  Pre-procedure details:     Indication(s): central venous access, hemodynamic monitoring and insufficient peripheral access      Hand hygiene: Hand hygiene performed prior to insertion      Sterile barrier technique: All elements of maximal sterile technique followed      Skin preparation:  Chlorhexidine    Skin preparation agent: Skin preparation agent completely dried prior to procedure    Procedure details:     Location:  R internal jugular    Patient position:  Trendelenburg    Procedural supplies:  Triple lumen    Landmarks identified: yes      Ultrasound guidance: yes      Ultrasound guidance timing: real time      Sterile ultrasound techniques: Sterile gel and sterile probe covers were used      Number of attempts:  1    Successful placement: yes    Post-procedure details:     Post-procedure:  Dressing applied and line sutured    Assessment:  Blood return through all ports and free fluid flow    Procedure completion:  Tolerated well, no immediate complications               Perri Watts MD  Resident  04/09/24 1335

## 2024-04-09 NOTE — ED PROCEDURE NOTE
Procedure  Paracentesis    Performed by: Lane Reeder DO  Authorized by: Jayden Rojas DO    Consent:     Consent obtained:  Written    Consent given by:  Spouse    Risks, benefits, and alternatives were discussed: yes      Risks discussed:  Bleeding, bowel perforation, infection and pain    Alternatives discussed:  No treatment and delayed treatment  Universal protocol:     Procedure explained and questions answered to patient or proxy's satisfaction: yes      Patient identity confirmed:  Hospital-assigned identification number and arm band  Pre-procedure details:     Procedure purpose:  Diagnostic    Preparation: Patient was prepped and draped in usual sterile fashion    Anesthesia:     Anesthesia method:  None  Procedure details:     Needle gauge:  18    Ultrasound guidance: yes      Puncture site:  L lower quadrant    Fluid removed amount:  7cc    Fluid appearance:  Annabelle  Post-procedure details:     Procedure completion:  Tolerated               Lane Reeder DO  Resident  04/09/24 0215

## 2024-04-09 NOTE — H&P
History Of Present Illness  Vera Beard is a 43 y.o. female presenting with lethargy and altered mental status. In the ED, patient was found to have Hb 6.3, reports of hematemesis. Pt was also found to be hypxoic on room air, initially placed on nasal cannula. Patient w/ hx of EtOH induced cirrhosis, hx of HE, recent hospitalization for similar presentation at [facility]. Pt was intubated in the ED for altered mental status and unable to protect airway.     CT CAP in the ED showed evidence of bilateral infiltrates, consistent with multifocal pneumonia. ETT was at the level of R mainstem bronchus/faby, tube was retracted by ED team.     In the ED:   Vitals: BP 77/54, , O2 94%, RR 18  Labs:   INR 1.7, PT 19.7, aPTT 43  Lipase WNL  UA: 2+ protein, trace blood, 75 leuk esterase  U tox: positive for cannabinoids and cocaine   CBC: 10.7/6.312.7/74  VB.30 / 26 lactate 1.8  Ammonia 115  TSH 17.27 free t4 0.32  RFP: 127/4.6  101/14  18/4.80 glucose 39; Mg 2.01, Phos 6.8  Acetaminophen, salicylate, alcohol all negative  AST 48, ALT 16, , tB 1.4    Medications:   Abx: Vanc + zosyn + azithromycin (-*)  Fluids: 1000 mL LR  75g 25% albumin   Procedures: Intubation, paracentesis, RIJ CVC, L radial art line  Medications: synthroid 200mcg IV, protonix 80mg IV, thiamine 500mg IV  Levophed, vasopressin, phenylephrine, stress dose steroids    Imaging:   CT CAP:   Chest  1. Endotracheal tube in place with the tip terminating at the level  of the faby/right mainstem bronchus. Recommend retracting tube.  2. Extensive consolidation/ground-glass opacities throughout both  lungs consistent with multifocal pneumonia.  3. Small bilateral pleural effusions.  4. Small pericardial effusion.      Abdomen-Pelvis  1. Large volume ascites.  2. Severe diffuse hepatic steatosis.  3. Cholelithiasis.  4. Severe body wall anasarca.    CXR:   IMPRESSION:  Extensive multifocal dense airspace disease compatible with  multifocal pneumonia.  "CT or radiographic follow-up to resolution is  advised     Past Medical History  No past medical history on file.    Surgical History  No past surgical history on file.     Social History  She has no history on file for tobacco use, alcohol use, and drug use.    Family History  No family history on file.     Allergies  Patient has no known allergies.    Review of Systems  Unable to be assessed     Physical Exam  Constitutional:       Comments: Intubated, sedated; does not withdraw to pain   HENT:      Head: Normocephalic.      Mouth/Throat:      Comments: Broken teeth, blood  Eyes:      Comments: L conjunctival hemorrhage    Cardiovascular:      Rate and Rhythm: Normal rate and regular rhythm.      Heart sounds: Murmur (systolic) heard.   Pulmonary:      Comments: Mechanical breath sounds  Diffuse rhonchi R>L  Abdominal:      Comments: Distended, firm   Musculoskeletal:      Right lower leg: Edema present.      Left lower leg: Edema present.   Skin:     Capillary Refill: Capillary refill takes more than 3 seconds.          Last Recorded Vitals  Blood pressure 103/72, pulse 88, temperature 36 °C (96.8 °F), temperature source Temporal, resp. rate 16, height 1.651 m (5' 5\"), weight 80 kg (176 lb 5.9 oz), SpO2 95 %.    Relevant Results  Scheduled medications  azithromycin, 500 mg, intravenous, Once  hydrocortisone sodium succinate, 50 mg, intravenous, q6h  lactated Ringer's, 1,000 mL, intravenous, Once  lactulose, 20 g, oral, Once  oxygen, , inhalation, Continuous - Inhalation  piperacillin-tazobactam, 2.25 g, intravenous, q6h  thiamine, 500 mg, intravenous, q8h TEZ  vancomycin, 2,000 mg, intravenous, Once      Continuous medications  D5 % and 0.9 % sodium chloride, 200 mL/hr, Last Rate: Stopped (04/09/24 1254)  dextrose 5 % and lactated Ringer's, 75 mL/hr  norepinephrine, 0-3 mcg/kg/min, Last Rate: 0.3 mcg/kg/min (04/09/24 1330)  propofol, 5-50 mcg/kg/min, Last Rate: 10 mcg/kg/min (04/09/24 1300)  vasopressin, 0.03 " Units/min      PRN medications  PRN medications: dextrose, dextrose, fentaNYL, glucagon, glucagon, ketamine, rocuronium, vancomycin  Results for orders placed or performed during the hospital encounter of 04/09/24 (from the past 24 hour(s))   POCT GLUCOSE   Result Value Ref Range    POCT Glucose 30 (L) 74 - 99 mg/dL   Blood Gas Venous Full Panel Unsolicited   Result Value Ref Range    POCT pH, Venous 7.30 (L) 7.33 - 7.43 pH    POCT pCO2, Venous 26 (L) 41 - 51 mm Hg    POCT pO2, Venous 47 (H) 35 - 45 mm Hg    POCT SO2, Venous 70 45 - 75 %    POCT Oxy Hemoglobin, Venous 68.5 45.0 - 75.0 %    POCT Hematocrit Calculated, Venous 20.0 (L) 36.0 - 46.0 %    POCT Sodium, Venous 128 (L) 136 - 145 mmol/L    POCT Potassium, Venous 4.9 3.5 - 5.3 mmol/L    POCT Chloride, Venous 101 98 - 107 mmol/L    POCT Ionized Calicum, Venous 1.21 1.10 - 1.33 mmol/L    POCT Glucose, Venous 40 (LL) 74 - 99 mg/dL    POCT Lactate, Venous 1.8 0.4 - 2.0 mmol/L    POCT Base Excess, Venous -12.4 (L) -2.0 - 3.0 mmol/L    POCT HCO3 Calculated, Venous 12.8 (L) 22.0 - 26.0 mmol/L    POCT Hemoglobin, Venous 6.6 (L) 12.0 - 16.0 g/dL    POCT Anion Gap, Venous 19.0 10.0 - 25.0 mmol/L    Patient Temperature 37.0 degrees Celsius   CBC and Auto Differential   Result Value Ref Range    WBC 10.7 4.4 - 11.3 x10*3/uL    nRBC 0.0 0.0 - 0.0 /100 WBCs    RBC 1.91 (L) 4.00 - 5.20 x10*6/uL    Hemoglobin 6.3 (LL) 12.0 - 16.0 g/dL    Hematocrit 17.2 (L) 36.0 - 46.0 %    MCV 90 80 - 100 fL    MCH 33.0 26.0 - 34.0 pg    MCHC 36.6 (H) 32.0 - 36.0 g/dL    RDW 19.1 (H) 11.5 - 14.5 %    Platelets 74 (L) 150 - 450 x10*3/uL    Neutrophils % 77.0 40.0 - 80.0 %    Immature Granulocytes %, Automated 0.7 0.0 - 0.9 %    Lymphocytes % 12.8 13.0 - 44.0 %    Monocytes % 9.1 2.0 - 10.0 %    Eosinophils % 0.3 0.0 - 6.0 %    Basophils % 0.1 0.0 - 2.0 %    Neutrophils Absolute 8.26 (H) 1.20 - 7.70 x10*3/uL    Immature Granulocytes Absolute, Automated 0.08 0.00 - 0.70 x10*3/uL    Lymphocytes  Absolute 1.38 1.20 - 4.80 x10*3/uL    Monocytes Absolute 0.98 0.10 - 1.00 x10*3/uL    Eosinophils Absolute 0.03 0.00 - 0.70 x10*3/uL    Basophils Absolute 0.01 0.00 - 0.10 x10*3/uL   Phosphorus   Result Value Ref Range    Phosphorus 6.8 (H) 2.5 - 4.9 mg/dL   Magnesium   Result Value Ref Range    Magnesium 2.01 1.60 - 2.40 mg/dL   Comprehensive metabolic panel   Result Value Ref Range    Glucose 39 (LL) 74 - 99 mg/dL    Sodium 127 (L) 136 - 145 mmol/L    Potassium 4.6 3.5 - 5.3 mmol/L    Chloride 101 98 - 107 mmol/L    Bicarbonate 14 (L) 21 - 32 mmol/L    Anion Gap 17 10 - 20 mmol/L    Urea Nitrogen 18 6 - 23 mg/dL    Creatinine 4.80 (H) 0.50 - 1.05 mg/dL    eGFR 11 (L) >60 mL/min/1.73m*2    Calcium 7.8 (L) 8.6 - 10.6 mg/dL    Albumin 1.5 (L) 3.4 - 5.0 g/dL    Alkaline Phosphatase 130 (H) 33 - 110 U/L    Total Protein 5.8 (L) 6.4 - 8.2 g/dL    AST 48 (H) 9 - 39 U/L    Bilirubin, Total 1.4 (H) 0.0 - 1.2 mg/dL    ALT 16 7 - 45 U/L   Protime-INR   Result Value Ref Range    Protime 21.2 (H) 9.8 - 12.8 seconds    INR 1.9 (H) 0.9 - 1.1   Ammonia   Result Value Ref Range    Ammonia 115 (HH) 16 - 53 umol/L   B-Type Natriuretic Peptide   Result Value Ref Range     (H) 0 - 99 pg/mL   TSH with reflex to Free T4 if abnormal   Result Value Ref Range    Thyroid Stimulating Hormone 17.27 (H) 0.44 - 3.98 mIU/L   Acute Toxicology Panel, Blood   Result Value Ref Range    Acetaminophen <10.0 10.0 - 30.0 ug/mL    Salicylate  <3 4 - 20 mg/dL    Alcohol <10 <=10 mg/dL   Coagulation Screen   Result Value Ref Range    Protime 19.7 (H) 9.8 - 12.8 seconds    INR 1.7 (H) 0.9 - 1.1    aPTT 43 (H) 27 - 38 seconds   Lipase   Result Value Ref Range    Lipase 18 9 - 82 U/L   Thyroxine, Free   Result Value Ref Range    Thyroxine, Free 0.32 (L) 0.78 - 1.48 ng/dL   Morphology   Result Value Ref Range    RBC Morphology See Below     Target Cells Few     Gilead Cells Few    Lactate   Result Value Ref Range    Lactate 2.0 0.4 - 2.0 mmol/L   POCT  GLUCOSE   Result Value Ref Range    POCT Glucose 165 (H) 74 - 99 mg/dL   Blood Culture    Specimen: Peripheral Venipuncture; Blood culture   Result Value Ref Range    Blood Culture Loaded on Instrument - Culture in progress    Blood Culture    Specimen: Peripheral Venipuncture; Blood culture   Result Value Ref Range    Blood Culture Loaded on Instrument - Culture in progress    Type and Screen   Result Value Ref Range    ABO TYPE A     Rh TYPE POS     ANTIBODY SCREEN NEG    Urinalysis with Reflex Culture and Microscopic   Result Value Ref Range    Color, Urine Yellow Light-Yellow, Yellow, Dark-Yellow    Appearance, Urine Turbid (N) Clear    Specific Gravity, Urine 1.022 1.005 - 1.035    pH, Urine 5.5 5.0, 5.5, 6.0, 6.5, 7.0, 7.5, 8.0    Protein, Urine 100 (2+) (A) NEGATIVE, 10 (TRACE), 20 (TRACE) mg/dL    Glucose, Urine Normal Normal mg/dL    Blood, Urine 0.03 (TRACE) (A) NEGATIVE    Ketones, Urine NEGATIVE NEGATIVE mg/dL    Bilirubin, Urine NEGATIVE NEGATIVE    Urobilinogen, Urine Normal Normal mg/dL    Nitrite, Urine NEGATIVE NEGATIVE    Leukocyte Esterase, Urine 75 Krzysztof/µL (A) NEGATIVE   Body Fluid Cell Count   Result Value Ref Range    Color, Fluid Red-brown (A) Colorless, Straw, Yellow    Clarity, Fluid Turbid (A) Clear    WBC, Fluid 17,078 See Comment /uL    RBC, Fluid 2,072,000 0  /uL /uL   Body Fluid Differential   Result Value Ref Range    Neutrophils %, Manual, Fluid 76 <25 % %    Lymphocytes %, Manual, Fluid 7 <75 % %    Mono/Macrophages %, Manual, Fluid 17 <70 % %    Eosinophils %, Manual, Fluid 0 0 % %    Basophils %, Manual, Fluid 0 0 % %    Immature Granulocytes %, Manual, Fluid 0 0 % %    Blasts %, Manual, Fluid 0 0 % %    Unclassified Cells %, Manual, Fluid 0 0 % %    Plasma Cells %, Manual, Fluid 0 0 % %    Total Cells Counted, Fluid 100    Microscopic Only, Urine   Result Value Ref Range    WBC, Urine 21-50 (A) 1-5, NONE /HPF    WBC Clumps, Urine OCCASIONAL Reference range not established. /HPF     RBC, Urine NONE NONE, 1-2, 3-5 /HPF    Squamous Epithelial Cells, Urine 10-25 (FEW) Reference range not established. /HPF    Mucus, Urine 1+ Reference range not established. /LPF    Hyaline Casts, Urine 4+ (A) NONE /LPF    Calcium Oxalate Crystals, Urine 1+ NONE, 1+ /HPF   Drug Screen, Urine   Result Value Ref Range    Amphetamine Screen, Urine Presumptive Negative Presumptive Negative    Barbiturate Screen, Urine Presumptive Negative Presumptive Negative    Benzodiazepines Screen, Urine Presumptive Negative Presumptive Negative    Cannabinoid Screen, Urine Presumptive Positive (A) Presumptive Negative    Cocaine Metabolite Screen, Urine Presumptive Positive (A) Presumptive Negative    Fentanyl Screen, Urine Presumptive Negative Presumptive Negative    Opiate Screen, Urine Presumptive Negative Presumptive Negative    Oxycodone Screen, Urine Presumptive Negative Presumptive Negative    PCP Screen, Urine Presumptive Negative Presumptive Negative    Methadone Screen, Urine Presumptive Negative Presumptive Negative   Glucose, Fluid   Result Value Ref Range    Glucose, Fluid 125 Not established mg/dL   Protein, Total Fluid   Result Value Ref Range    Protein, Total Fluid 0.6 Not established g/dL   VERIFY ABO/Rh Group Test   Result Value Ref Range    ABO TYPE A     Rh TYPE POS    POCT GLUCOSE   Result Value Ref Range    POCT Glucose 193 (H) 74 - 99 mg/dL     XR chest 1 view    Result Date: 4/9/2024  Interpreted By:  Servando Sosa  and Shazia Tate STUDY: XR CHEST 1 VIEW;  4/9/2024 3:35 pm   INDICATION: Signs/Symptoms:central line placement, RIJ.   COMPARISON: Same day chest radiograph time stamped 1:34 p.m.   ACCESSION NUMBER(S): [id number]   ORDERING CLINICIAN: RYAN GUERRERO   FINDINGS: Single AP view of the chest was provided.   Interval retraction of an endotracheal tube with tip now terminating 3.2 cm above the faby, in satisfactory positioning. Interval placement of a right internal jugular approach  central venous catheter with tip overlying the superior cavoatrial junction.   CARDIOMEDIASTINAL SILHOUETTE: Similar obscuration of much of the cardiac border due to overlying pulmonary opacities.   LUNGS: Similar dense patchy alveolar opacities throughout the bilateral lungs, primarily in the right upper lobe. Probable small left pleural effusion. No pneumothorax.   ABDOMEN: Hyperdense material overlying the stomach. Mild dilation of multiple loops of bowel in the upper abdomen.   BONES: No acute osseous changes.       1. Satisfactory positioning of right internal jugular approach central venous catheter. 2. Unchanged appearance of patchy pulmonary opacities throughout the bilateral lungs, particularly in the right upper lobe, with a probable small left pleural effusion. 3. Hyperdense material overlying the stomach with multiple dilated loops of bowel in the upper abdomen. Correlate with same day CT.   I personally reviewed the image(s)/study and resident interpretation as stated by Dr. Lea Mccray MD. I agree with the findings as stated. This study was interpreted at [facility], [place], [place].   MACRO: None   Signed by: Servando Sosa 4/9/2024 4:09 PM Dictation workstation:   [id number]    CT chest abdomen pelvis wo IV contrast    Result Date: 4/9/2024  Interpreted By:  Rosales Vernon, STUDY: CT CHEST ABDOMEN PELVIS WO CONTRAST;  4/9/2024 2:13 pm   INDICATION: Signs/Symptoms:Sepsis. Presenting with altered mental status.   COMPARISON: None.   ACCESSION NUMBER(S): [id number]   ORDERING CLINICIAN: BESS SEGOVIA   TECHNIQUE: CT of the chest, abdomen and pelvis was performed. Contiguous axial images were obtained at 3 mm slice thickness through the chest, abdomen and pelvis. Coronal and sagittal reconstructions at 3 mm slice thickness were performed. No intravenous contrast was administered.   FINDINGS: Please note that the study is limited without intravenous contrast.    CHEST:   LUNG/PLEURA/LARGE AIRWAYS: Endotracheal tube in place with the tip terminating at the level of the faby/right mainstem bronchus. Mild secretions in the distal trachea.   There are extensive confluent and patchy consolidations and ground-glass opacities throughout the bilateral lungs consistent with multifocal pneumonia. Small bilateral pleural effusions, right-greater-than-left. No pneumothorax.   VESSELS: Aorta and pulmonary arteries are normal caliber.  No significant atherosclerotic changes of the thoracic aorta. Mild coronary artery calcifications are present.   HEART: The heart is normal in size. Small pericardial effusion.   MEDIASTINUM AND CHARIS: No mediastinal, hilar, or axillary lymphadenopathy. The esophagus is within normal limits.   CHEST WALL AND LOWER NECK: Severe diffuse chest wall edema is present. The visualized thyroid gland appears within normal limits.   ABDOMEN:   LIVER: Normal size and smooth contour. Severe diffuse hepatic steatosis. No focal parenchymal abnormalities are seen.   BILE DUCTS: The intrahepatic and extrahepatic bile ducts are nondilated.   GALLBLADDER: Cholelithiasis. The gallbladder is nondistended.   PANCREAS: The pancreas is unremarkable without evidence of ductal dilation or masses.   SPLEEN: The spleen is normal in size and without evidence of focal lesions.   ADRENAL GLANDS: Within normal limits.   KIDNEYS AND URETERS: Bilateral kidneys are mildly atrophic. Punctate 0.3 cm calculus at the right upper pole. No hydronephrosis.   PELVIS:   BLADDER: The bladder is decompressed with Tadeo catheter in place.   REPRODUCTIVE ORGANS: The uterus is present. No suspicious pelvic masses.   BOWEL: The stomach is unremarkable.  The small and large bowel are normal in caliber and demonstrate no wall thickening.  The appendix is not definitively visualized.   VESSELS: There is no aneurysmal dilatation of the abdominal aorta. The IVC is within normal limits. Mild atherosclerotic  changes of the abdominal aorta and its branch vessels.   PERITONEUM/RETROPERITONEUM/LYMPH NODES: There is large volume ascites. No loculated fluid collections or intraperitoneal free air. No abdominopelvic lymphadenopathy is present.   ABDOMINAL WALL: Severe diffuse abdominal wall anasarca.   BONES: No acute osseous abnormality or suspicious osseous lesions.       Chest 1. Endotracheal tube in place with the tip terminating at the level of the faby/right mainstem bronchus. Recommend retracting tube. 2. Extensive consolidation/ground-glass opacities throughout both lungs consistent with multifocal pneumonia. 3. Small bilateral pleural effusions. 4. Small pericardial effusion.   Abdomen-Pelvis 1. Large volume ascites. 2. Severe diffuse hepatic steatosis. 3. Cholelithiasis. 4. Severe body wall anasarca.   I personally reviewed the images/study and I agree with the findings as stated by resident physician Rosales Vernon MD. This study was interpreted at [place], [place].   The above information was relayed to Ordering provider Britt Watts by Rosales Vernon MD via epic haiku at 2:30 p.m. on 04/09/2024 with readback verification.   MACRO: None     Dictation workstation:   [id number]    CT head wo IV contrast    Result Date: 4/9/2024  Interpreted By:  Bob Mitchell, STUDY: CT HEAD WO IV CONTRAST;  4/9/2024 2:08 pm   INDICATION: Signs/Symptoms:AMS.   COMPARISON: None.   ACCESSION NUMBER(S): [id number]   ORDERING CLINICIAN: BRITT WATTS   TECHNIQUE: Noncontrast axial CT scan of head was performed.   FINDINGS: Parenchyma: There is no intracranial hemorrhage. The grey-white differentiation is intact. There is no mass effect or midline shift. Patchy nonspecific white matter hypodensities involving the deep white matter of the bilateral cerebral hemispheres.   CSF Spaces: The ventricles, sulci and basal cisterns are within normal limits for age.   Extra-Axial Fluid: There is no  extraaxial fluid collection.   Calvarium: The calvarium is unremarkable.   Paranasal sinuses: Visualized paranasal sinuses are clear.   Mastoids: Clear.   Orbits: Normal.   Soft tissues: Unremarkable.       No acute intracranial hemorrhage, mass effect, or CT apparent acute infarct.   Mild nonspecific patchy white matter hypodensity involving the bilateral cerebral hemispheres that may reflect age advanced chronic small vessel ischemic disease. MRI may be helpful for further characterization as clinically warranted.   MACRO: None   Signed by: Bob Mitchell 4/9/2024 2:30 PM Dictation workstation:   [id number]    XR chest 1 view    Result Date: 4/9/2024  Interpreted By:  Kosmas, Servando,  and Shazia Lea STUDY: XR CHEST 1 VIEW;  4/9/2024 1:43 pm   INDICATION: Signs/Symptoms:Intubation.   COMPARISON: Chest radiograph 04/09/2024 time stamped 11:53 a.m..   ACCESSION NUMBER(S): [id number]   ORDERING CLINICIAN: BESS SEGOVIA   FINDINGS: Single AP supine radiograph of the chest was provided.   Interval intubation with endotracheal tube tip terminating 0.6 cm above the faby.   CARDIOMEDIASTINAL SILHOUETTE: Much of the cardiomediastinal silhouette is obscured by adjacent pulmonary opacities.   LUNGS: Increasing patchy alveolar pulmonary opacities throughout the bilateral lungs, particularly worsening in the right upper lung field and adjacent to the right heart border. Probable small left pleural effusion. No pneumothorax.   ABDOMEN: No remarkable upper abdominal findings.   BONES: No acute osseous changes.       1. Interval intubation with endotracheal tube 0.6 cm above the faby, recommend slight retraction. 2. Worsening patchy alveolar opacities throughout the bilateral lungs, particularly in the right upper and middle lobes suspicious for multifocal pneumonia likely with a component of pulmonary edema.   I personally reviewed the image(s)/study and resident interpretation as stated by Dr. Ttae  MD Shazia. I agree with the findings as stated. This study was interpreted at [facility], [place], [place].   MACRO: None   Signed by: Servando Sosa 4/9/2024 2:19 PM Dictation workstation:   [id number]    XR chest 1 view    Result Date: 4/9/2024  Interpreted By:  Servando Sosa, STUDY: XR CHEST 1 VIEW;  4/9/2024 12:02 pm   INDICATION: Signs/Symptoms:Hypoxia.   COMPARISON: None.   ACCESSION NUMBER(S): [id number]   ORDERING CLINICIAN: BESS SEGOVIA   FINDINGS:         CARDIOMEDIASTINAL SILHOUETTE: Cardiomediastinal silhouette is normal in size and configuration.   LUNGS: There is extensive multifocal dense airspace disease worse in the right upper lung and the bases bilaterally.   ABDOMEN: No remarkable upper abdominal findings.   BONES: No acute osseous changes.       Extensive multifocal dense airspace disease compatible with multifocal pneumonia. CT or radiographic follow-up to resolution is advised   MACRO: None   Signed by: Servando Sosa 4/9/2024 12:49 PM Dictation workstation:   [id number]        Assessment/Plan   Principal Problem:    Sayda Beard is a 43-year-old female with past medical history of chronic pancreatitis per chart review, alcohol induced hepatic cirrhosis with prior upper GI bleed and hepatic encephalopathy who presented to the emergency department for lethargy and altered mental status.  While in the emergency department, patient was hypotensive and mental status worsened necessitating emergent intubation and initiation of vasopressors.  Currently suspect septic shock in setting of multifocal pneumonia seen on CT chest on admission, as well as some component of hemorrhagic shock given hemoglobin of 6.3 on admission.    NEURO  # Metabolic encephalopathy  #Hepatic encephalopathy  :: Recent admission at [facility] for hemorrhagic shock in the setting of hematemesis; hemoglobin was 3.2 on admission with lactate of 13; EGD during that  admission revealed grade 1 gastric varices which were injected   :: Ammonia 115 on admission  :: Possible some component of encephalopathy from metabolic derangements from sepsis  :: TSH 17.27, free T4 0.32  - VBG 7.26/27 with lactate 2.2  - Intubated in ED for AMS and airway protection  Plan:   -Rifaximin 550 mg twice daily  -Lactulose 20 g every 2 hours until bowel movements achieved; will then titrate to 3-4 bowel movements per day  -Antibiotics as under ID for sepsis  - 200mcg synthroid IV in ED   - 100mg hydrocortisone in ED     CARDIOVASCULAR  #Shock, likely septic from pneumonia and SBP, possible some component hemorrhagic   :: CT chest abdomen pelvis 4/9 showing multifocal pneumonia  :: diagnostic paracentesis in ED w/ 2,072,000 RBC, 17,078 WBC w. 76% PMNs; 4691 PMNs after correction for RBCs  - Hypotensive 70s/50s in ED, now on pressors   - Hb 6.3 on admission, previously 3.2 on admission at [facility], baseline ~11; s/p 1 unit PRBCs  Plan:   - blood cultures x2, urinalysis w/ reflex culture, ascitic fluid as above  - Vanc, Zosyn, azithromycin (4/9-*)  - Strep and legionella antigens  - Currently on levophed and vasopressin, MAP goal >65  - 75g 25% albumin, 1L LR in ED, 1 additional liter LR in MICU (2L LR total)  - Hydrocortisone 50mg q6hrs  - Repeat CBC after transfusion    PULMONARY  #Acute hypoxic respiratory failure 2/2 multifocal pneumonia  :: CT chest on admission showing bilateral patchy infiltrates consistent with multifocal pneumonia  :: Hypoxic on presentation requiring nasal cannula, subsequently intubated for AMS and airway protection   Plan:   - Abx w/ vanc, zoysn, azithromycin (4/9-*)  - resp culture  - strep and legionella antigens  - blood cultures x2   - procalcitonin  -MRSA nares    #mixed anion gap metabolic acidosis and non-gap metabolic acidosis  - anion gap when corrected for albumin 18.5, bicarb 14, delta gap 4 indicating normal gap and high gap acidosis  - Likely in setting of lactic  acidosis, renal failure, and possible vomiting prior to presentation  - ABG 7.26/29/73  Plan:   - Infection management as above  - 50mEq amp bicarb push  - ABG prn to monitor acidosis and oxygenation    GI  # Acute decompensated liver cirrhosis secondary to ethanol  #Hepatic encephalopathy  #Spontaneous bacterial peritonitis  - hx of HE, recently admitted  - unclear if compliant w/ lactulose at home  - ammonia elevated on admission  Plan:   - Abx for SBP as above  - s/p 75g 25% albumin in ED, will need albumin again 4/10 and 4/11  - Lactulose 20g q2hr until bowel movements, then titrate for 3-4BM/day  - Rifaximin 550mg BID  - Hepatology consult in AM  - 80mg IV PPI in ED; continue 40mg BID IV  - Nutrition consult in AM, OGT placed for enteral access  - RUQ ultrasound    #EtOH use disorder  - Per fibernard, sober since 3/9  Plan  - High dose thiamine 500mg IV  - Folic acid 1mg daily   -If extubated <72 hrs, recommend CIWA; currently on propofol     RENAL/  #KEITH stage 3  - creatinine 4.8 from baseline ~1 prior to last admission  - Likely multifactorial in setting of septic shock, possible HRS  Plan:   - S/p 2 L LR and 75 g albumin, 15mL output over last 2 hours post arrival to ICU  - Shwetha for accurate ins and outs  - Urine lytes  - CT scan without signs of hydronephrosis  - Strict I and o   - consider Renal consult if worsening acidosis  - PRN ABGs to assess acidosis and hyperK   - Renally dose medications  - Avoid nephrotoxins     #HAGMA and NAGMA  -2/2 Lactic acidosis and renal failure  - management under pulmonary     INFECTIOUS DISEASE  #SBP  #Multifocal pneumonia  - SBP on diagnostic paracentesis  - Pneumonia on CT scan w/ AHRF  Plan:   - Vancomycin (4/9-*)  - Zosyn 2.25g q6hrs renal dosing (4/9-*)  - Azithromycin 500mg (4/9-*)  - MRSA nares  - Ascites culture  - Respiratory culture  - Strep and legionella antigens  - Blood cultures x2     HEME/ONC  #Acute on chronic macrocytic anemia  - Baseline Hb ~11 per [facility]  records, 3.2 on recent admission there, 6.3 on admission here  - Family denies hematemesis prior to this admission  Plan:   - s/p 1 unit PRBCs  - Iron, tibc, ferritin, b12, folate   - Thyroid as below   - Transfuse Hb<7    #Thrombocytopenia  - Likely secondary Cirrhosis and septic shock  Plan:  - Transfuse Plt <10 or <50 and bleeding    #Coagulopathy   - likely secondary to cirrhosis and septic shock  Plan:   - vitamin K 10mg IV x1  - continue to monitor     MSK/RHEUM  #No active issues     ENDOCRINE  #Hypothyroidism  - Unclear if chronic hypothyroidism, no TSH in EMR  - TSH 12.27 on admission, free T4 0.32  - s/p synthroid 200mcg IV in ED and hydrocortisone 100mg IV  Plan:   - consult endocrine in AM  - Prelim recs: after treatment in ED, no further intervention; recheck TSH and T4 in AM   - Continue stress dose steroids as under CV    F: PRN, s/p 2L LR and 75 g albumin  E: PRN  N: NPO  A: RIJ CVC (4/9), L radial A line (4/9), 2 20g IV, krishnamurthy (4/9)  O2: VC/AC , RR 16, FiO2 60% PEEP 18gcG3C  Abx: Vanc, zosyn, azithro (4/9-*)  Gtt: levo 0.26, vaso 0.03, propofol 10mcg/kg/min    NOK: Meagan Murray [phone]  Code status: full code      Woodrow Cage MD

## 2024-04-09 NOTE — CONSULTS
"Vancomycin Dosing by Pharmacy- INITIAL    Vera Beard is a 43 y.o. year old female who Pharmacy has been consulted for vancomycin dosing for pneumonia. Based on the patient's indication and renal status this patient will be dosed based on a goal pre-HD level of 15-25.     Renal function is currently KEITH Cr=4.8 mg/dl.    Visit Vitals  /72   Pulse 88   Temp 36 °C (96.8 °F) (Temporal)   Resp 16        Lab Results   Component Value Date    CREATININE 4.80 (H) 04/09/2024        Patient weight is No results found for: \"PTWEIGHT\"    No results found for: \"CULTURE\"     No intake/output data recorded.  [unfilled]    Lab Results   Component Value Date    PATIENTTEMP 37.0 04/09/2024          Assessment/Plan     Will initiate dosing based off of levels. Will give 2000 mg x1 dose today and follow up with drug level in 24 hours..      Follow-up level will be ordered on 4/10/24 at 1800 unless clinically indicated sooner.  Will continue to monitor renal function daily while on vancomycin and order serum creatinine at least every 48 hours if not already ordered.  Follow for continued vancomycin needs, clinical response, and signs/symptoms of toxicity.       Jarod Osman, PharmD       "

## 2024-04-09 NOTE — ED PROVIDER NOTES
CC: Altered Mental Status and Hypoglycemia     HPI:  Patient is a 42yo female with PMHx of chronic pancreatitis, alcohol-induced liver cirrhosis c/b prior upper GI bleed and hepatic encephalopathy, presenting to the ED with AMS. Per EMS report, brittny called 911 because patient less responsive and grinding her teeth all night. EMS found patient hypoglycemic and hypotensive. They reported they could not get IV access and patient too altered to take PO. Only other history reported was that patient was just admitted in the hospitals, unclear what for initially. Rest of history gathering limited by patient's AMS.     Limitations to History: AMS    Additional History Obtained from: Documentation, EMS, and Spouse/Significant Other    Records Reviewed: Recent available ED and inpatient notes reviewed in EMR.    PMHx/PSHx:  Per HPI.   - has no past medical history on file.  - has no past surgical history on file.    Medications: Reviewed in EMR. See EMR for complete list of medications and doses.    Allergies:  Patient has no known allergies.    Social History:  - Tobacco:  has no history on file for tobacco use.   - Alcohol:  has no history on file for alcohol use.   - Illicit Drugs:  has no history on file for drug use.   ???????????????????????????????????????????????????????????????  Triage Vitals:  T 36.2 °C (97.2 °F)    BP 77/54  RR 18  O2 94 % None (Room air)    PHYSICAL EXAM:   VS: As documented in the triage note and EMR flowsheet from this visit were reviewed.  Gen: ill appearing female laying in bed with eyes closed, grinding teeth  Eyes: PERRL, opens eye to stimuli and crosses midline  HENT: NC/AT, teeth clenched and grinding, membranes appear dry  Resp: Non-labored breathing on 2L NC, no stridor, no wheezes  CV: tachycardic, RR  Abd: Soft, distended, no rebound or guarding  Ext: b/l LE edema, pulses full and equal  Skin: WWP. No systemic rashes or lesions.  MSK: normal muscle bulk, no obvious joint  swelling in extremities  Neuro:  alert and oriented to self but not time place or situation, no obvious facial droop, moves all extremities spontaneously with 5/5 strength, withdraws from painful stimuli x4 extremities  Psych: agitated, uncooperative  ???????????????????????????????????????????????????????????????    ED Labs/Imaging:   Labs Reviewed   RESPIRATORY CULTURE/SMEAR - Abnormal       Result Value    Respiratory Culture/Smear (4+) Abundant Pseudomonas aeruginosa (*)     Gram Stain (1+) Rare Polymorphonuclear leukocytes (*)     Gram Stain (3+) Moderate Gram negative bacilli (*)     Gram Stain (1+) Rare Gram positive cocci (*)    MRSA SURVEILLANCE FOR VANCOMYCIN DE-ESCALATION, PCR - Abnormal    MRSA PCR Detected (*)     Narrative:     This assay is an FDA-approved in vitro diagnostic nucleic acid amplification test for the detection of methicillin-resistant Staphylococcus aureus (MRSA) DNA directly from nasal swabs in patients at risk for nasal colonization. MRSA NxG is intended to aid in the prevention and control of MRSA infections in healthcare settings. This assay is NOT intended to diagnose, guide, or monitor treatment for MRSA infections, or provide results of susceptibility to methicillin. A negative result does not preclude MRSA nasal colonization. Test performance has not been evaluated in patients less than two years of age.   CBC WITH AUTO DIFFERENTIAL - Abnormal    WBC 10.7      nRBC 0.0      RBC 1.91 (*)     Hemoglobin 6.3 (*)     Hematocrit 17.2 (*)     MCV 90      MCH 33.0      MCHC 36.6 (*)     RDW 19.1 (*)     Platelets 74 (*)     Neutrophils % 77.0      Immature Granulocytes %, Automated 0.7      Lymphocytes % 12.8      Monocytes % 9.1      Eosinophils % 0.3      Basophils % 0.1      Neutrophils Absolute 8.26 (*)     Immature Granulocytes Absolute, Automated 0.08      Lymphocytes Absolute 1.38      Monocytes Absolute 0.98      Eosinophils Absolute 0.03      Basophils Absolute 0.01      PHOSPHORUS - Abnormal    Phosphorus 6.8 (*)    COMPREHENSIVE METABOLIC PANEL - Abnormal    Glucose 39 (*)     Sodium 127 (*)     Potassium 4.6      Chloride 101      Bicarbonate 14 (*)     Anion Gap 17      Urea Nitrogen 18      Creatinine 4.80 (*)     eGFR 11 (*)     Calcium 7.8 (*)     Albumin 1.5 (*)     Alkaline Phosphatase 130 (*)     Total Protein 5.8 (*)     AST 48 (*)     Bilirubin, Total 1.4 (*)     ALT 16     PROTIME-INR - Abnormal    Protime 21.2 (*)     INR 1.9 (*)    AMMONIA - Abnormal    Ammonia 115 (*)    B-TYPE NATRIURETIC PEPTIDE - Abnormal     (*)     Narrative:        <100 pg/mL - Heart failure unlikely  100-299 pg/mL - Intermediate probability of acute heart                  failure exacerbation. Correlate with clinical                  context and patient history.    >=300 pg/mL - Heart Failure likely. Correlate with clinical                  context and patient history.     Biotin interference may cause falsely decreased results. Patients taking a Biotin dose of up to 5 mg/day should refrain from taking Biotin for 24 hours before sample  collection. Providers may contact their local laboratory for further information.   TSH WITH REFLEX TO FREE T4 IF ABNORMAL - Abnormal    Thyroid Stimulating Hormone 17.27 (*)     Narrative:     TSH testing is performed using different testing methodology at [facility] than at other [facility]. Direct result comparisons should only be made within the same method.     URINALYSIS WITH REFLEX CULTURE AND MICROSCOPIC - Abnormal    Color, Urine Yellow      Appearance, Urine Turbid (*)     Specific Gravity, Urine 1.022      pH, Urine 5.5      Protein, Urine 100 (2+) (*)     Glucose, Urine Normal      Blood, Urine 0.03 (TRACE) (*)     Ketones, Urine NEGATIVE      Bilirubin, Urine NEGATIVE      Urobilinogen, Urine Normal      Nitrite, Urine NEGATIVE      Leukocyte Esterase, Urine 75 Krzysztof/µL (*)    DRUG SCREEN,URINE - Abnormal    Amphetamine  Screen, Urine Presumptive Negative      Barbiturate Screen, Urine Presumptive Negative      Benzodiazepines Screen, Urine Presumptive Negative      Cannabinoid Screen, Urine Presumptive Positive (*)     Cocaine Metabolite Screen, Urine Presumptive Positive (*)     Fentanyl Screen, Urine Presumptive Negative      Opiate Screen, Urine Presumptive Negative      Oxycodone Screen, Urine Presumptive Negative      PCP Screen, Urine Presumptive Negative      Methadone Screen, Urine Presumptive Negative      Narrative:     Drug screen results are presumptive and should not be used to assess   compliance with prescribed medication. Contact the performing [facility] laboratory   to add-on definitive confirmatory testing if clinically indicated.    Toxicology screening results are reported qualitatively. The concentration must   be greater than or equal to the cutoff to be reported as positive. The concentration   at which the screening test can detect an individual drug or metabolite varies.   The absence of expected drug(s) and/or drug metabolite(s) may indicate non-compliance,   inappropriate timing of specimen collection relative to drug administration, poor drug   absorption, diluted/adulterated urine, or limitations of testing. For medical purposes   only; not valid for forensic use.    Interpretive questions should be directed to the laboratory medical directors.   COAGULATION SCREEN - Abnormal    Protime 19.7 (*)     INR 1.7 (*)     aPTT 43 (*)     Narrative:     The APTT is no longer used for monitoring Unfractionated Heparin Therapy. For monitoring Heparin Therapy, use the Heparin Assay.   THYROXINE, FREE - Abnormal    Thyroxine, Free 0.32 (*)     Narrative:     Thyroxine Free testing is performed using different testing methodology at [facility] than at other [facility]. Direct result comparisons should only be made within the same method.   BLOOD GAS VENOUS FULL PANEL - Abnormal    POCT pH, Venous 7.21  (*)     POCT pCO2, Venous 32 (*)     POCT pO2, Venous 63 (*)     POCT SO2, Venous 86 (*)     POCT Oxy Hemoglobin, Venous 85.2 (*)     POCT Hematocrit Calculated, Venous 21.0 (*)     POCT Sodium, Venous 127 (*)     POCT Potassium, Venous 4.3      POCT Chloride, Venous 101      POCT Ionized Calicum, Venous 1.18      POCT Glucose, Venous 170 (*)     POCT Lactate, Venous 2.0      POCT Base Excess, Venous -13.9 (*)     POCT HCO3 Calculated, Venous 12.8 (*)     POCT Hemoglobin, Venous 7.0 (*)     POCT Anion Gap, Venous 18.0      Patient Temperature 37.0      FiO2 21     BODY FLUID CELL COUNT - Abnormal    Color, Fluid Red-brown (*)     Clarity, Fluid Turbid (*)     WBC, Fluid 17,078      RBC, Fluid 2,072,000      Narrative:     Body Fluid cell count reference ranges have not been established by [facility]. Based on published references.   MICROSCOPIC ONLY, URINE - Abnormal    WBC, Urine 21-50 (*)     WBC Clumps, Urine OCCASIONAL      RBC, Urine NONE      Squamous Epithelial Cells, Urine 10-25 (FEW)      Mucus, Urine 1+      Hyaline Casts, Urine 4+ (*)     Calcium Oxalate Crystals, Urine 1+     BLOOD GAS ARTERIAL FULL PANEL - Abnormal    POCT pH, Arterial 7.26 (*)     POCT pCO2, Arterial 27 (*)     POCT pO2, Arterial 73 (*)     POCT SO2, Arterial 95      POCT Oxy Hemoglobin, Arterial 93.3 (*)     POCT Hematocrit Calculated, Arterial 21.0 (*)     POCT Sodium, Arterial 125 (*)     POCT Potassium, Arterial 4.4      POCT Chloride, Arterial 102      POCT Ionized Calcium, Arterial 1.14      POCT Glucose, Arterial 196 (*)     POCT Lactate, Arterial 2.2 (*)     POCT Base Excess, Arterial -13.7 (*)     POCT HCO3 Calculated, Arterial 12.1 (*)     POCT Hemoglobin, Arterial 7.1 (*)     POCT Anion Gap, Arterial 15      Patient Temperature 37.0      FiO2 60     CBC - Abnormal    WBC 17.5 (*)     nRBC 0.0      RBC 2.12 (*)     Hemoglobin 6.8 (*)     Hematocrit 19.1 (*)     MCV 90      MCH 32.1      MCHC 35.6       RDW 18.7 (*)     Platelets 89 (*)    ELECTROLYTE PANEL, URINE - Abnormal    Sodium, Urine Random 18      Sodium/Creatinine Ratio 10      Potassium, Urine Random 65      Potassium/Creatinine Ratio 36      Chloride, Urine Random 21      Chloride/Creatinine Ratio 12 (*)     Creatinine, Urine Random 178.7     PROCALCITONIN - Abnormal    Procalcitonin 1.36 (*)     Narrative:     Procalcitonin (PCT) results measured serially can  aid in decision-making for antibiotic discontinuation in  patients with suspected or confirmed sepsis in conjunction  with additional clinical information. Antibiotic  discontinuation may be considered with a change in PCT of  >80% from the peak result or when PCT falls below 0.50 ng/mL.    Procalcitonin results should not be used in isolation but  should be interpreted in conjunction with additional clinical  and laboratory findings. Procalcitonin results should not be  used to guide the initiation of antibiotic therapy.    Falsely low PCT values in the presence of bacterial infection  may occur in early infection, with atypical pathogens,  localized infections, and subacute infectious endocarditis.    Falsely elevated results outside of severe bacterial  infection/sepsis may be seen in patients with renal failure  or insufficiency, severe trauma or burns, recent major  abdominal/cardiac surgery, acute multi-organ failure, rarely  in patients with medullary thyroid carcinoma and rare  neuroendocrine tumors, and non-specific interfering antibodies  (heterophile antibodies, rheumatoid factor, human anti-mouse  antibodies (HAMA), etc).    Performance of the PCT test in pediatric patients (<19yo),  pregnant women, immunocompromised patients, and patients on  immunomodulatory medications has not been evaluated.   IRON AND TIBC - Abnormal    Iron 58      UIBC <55 (*)     TIBC        % Saturation       VITAMIN B12 - Abnormal    Vitamin B12 1,370 (*)    FERRITIN - Abnormal    Ferritin 2,016 (*)    CBC  WITH AUTO DIFFERENTIAL - Abnormal    WBC 15.0 (*)     nRBC 0.0      RBC 2.96 (*)     Hemoglobin 9.3 (*)     Hematocrit 25.4 (*)     MCV 86      MCH 31.4      MCHC 36.6 (*)     RDW 19.5 (*)     Platelets 90 (*)     Immature Granulocytes %, Automated 0.9      Immature Granulocytes Absolute, Automated 0.13      Narrative:     The previously reported component Neutrophils % is no longer being reported.  The previously reported component Lymphocytes % is no longer being reported.  The previously reported component Monocytes % is no longer being reported.  The previously   reported component Eosinophils % is no longer being reported.  The previously reported component Basophils % is no longer being reported.  The previously reported component Absolute Neutrophils is no longer being reported.  The previously reported   component Absolute Lymphocytes is no longer being reported.  The previously reported component Absolute Monocytes is no longer being reported.  The previously reported component Absolute Eosinophils is no longer being reported.  The previously reported   component Absolute Basophils is no longer being reported.   COAGULATION SCREEN - Abnormal    Protime 22.5 (*)     INR 2.0 (*)     aPTT 61 (*)     Narrative:     The APTT is no longer used for monitoring Unfractionated Heparin Therapy. For monitoring Heparin Therapy, use the Heparin Assay.   HEPATIC FUNCTION PANEL - Abnormal    Albumin 1.7 (*)     Bilirubin, Total 2.2 (*)     Bilirubin, Direct 0.8 (*)     Alkaline Phosphatase 124 (*)     ALT 19      AST 57 (*)     Total Protein 5.8 (*)    TSH - Abnormal    Thyroid Stimulating Hormone 6.31 (*)     Narrative:     TSH testing is performed using different testing methodology at [facility] than at other [facility]. Direct result comparisons should only be made within the same method.     TRIIODOTHYRONINE, FREE - Abnormal    Triiodothyronine, Free 0.3 (*)    BLOOD GAS ARTERIAL FULL PANEL -  Abnormal    POCT pH, Arterial 7.23 (*)     POCT pCO2, Arterial 26 (*)     POCT pO2, Arterial 76 (*)     POCT SO2, Arterial 96      POCT Oxy Hemoglobin, Arterial 94.4      POCT Hematocrit Calculated, Arterial 28.0 (*)     POCT Sodium, Arterial 124 (*)     POCT Potassium, Arterial 5.2      POCT Chloride, Arterial 99      POCT Ionized Calcium, Arterial 1.03 (*)     POCT Glucose, Arterial 239 (*)     POCT Lactate, Arterial 3.5 (*)     POCT Base Excess, Arterial -15.2 (*)     POCT HCO3 Calculated, Arterial 10.9 (*)     POCT Hemoglobin, Arterial 9.2 (*)     POCT Anion Gap, Arterial 19      Patient Temperature 37.0      FiO2 100     PHOSPHORUS - Abnormal    Phosphorus 7.2 (*)    BASIC METABOLIC PANEL - Abnormal    Glucose 238 (*)     Sodium 125 (*)     Potassium 5.0      Chloride 99      Bicarbonate 11 (*)     Anion Gap 20      Urea Nitrogen 17      Creatinine 4.61 (*)     eGFR 11 (*)     Calcium 7.3 (*)    CBC WITH AUTO DIFFERENTIAL - Abnormal    WBC 13.0 (*)     nRBC 0.0      RBC 2.67 (*)     Hemoglobin 8.4 (*)     Hematocrit 23.2 (*)     MCV 87      MCH 31.5      MCHC 36.2 (*)     RDW 18.8 (*)     Platelets 90 (*)     Immature Granulocytes %, Automated 1.3 (*)     Immature Granulocytes Absolute, Automated 0.17      Narrative:     The previously reported component Neutrophils % is no longer being reported.  The previously reported component Lymphocytes % is no longer being reported.  The previously reported component Monocytes % is no longer being reported.  The previously   reported component Eosinophils % is no longer being reported.  The previously reported component Basophils % is no longer being reported.  The previously reported component Absolute Neutrophils is no longer being reported.  The previously reported   component Absolute Lymphocytes is no longer being reported.  The previously reported component Absolute Monocytes is no longer being reported.  The previously reported component Absolute Eosinophils is no  longer being reported.  The previously reported   component Absolute Basophils is no longer being reported.   BLOOD GAS ARTERIAL FULL PANEL - Abnormal    POCT pH, Arterial 7.20 (*)     POCT pCO2, Arterial 26 (*)     POCT pO2, Arterial 90      POCT SO2, Arterial 97      POCT Oxy Hemoglobin, Arterial 96.0      POCT Hematocrit Calculated, Arterial 28.0 (*)     POCT Sodium, Arterial 123 (*)     POCT Potassium, Arterial 5.4 (*)     POCT Chloride, Arterial 99      POCT Ionized Calcium, Arterial 1.07 (*)     POCT Glucose, Arterial 250 (*)     POCT Lactate, Arterial 4.0 (*)     POCT Base Excess, Arterial -16.4 (*)     POCT HCO3 Calculated, Arterial 10.2 (*)     POCT Hemoglobin, Arterial 9.2 (*)     POCT Anion Gap, Arterial 19      Patient Temperature 37.0      FiO2 100     CBC - Abnormal    WBC 16.2 (*)     nRBC 0.0      RBC 2.41 (*)     Hemoglobin 7.6 (*)     Hematocrit 23.5 (*)     MCV 98      MCH 31.5      MCHC 32.3      RDW 21.1 (*)     Platelets 77 (*)    BLOOD GAS ARTERIAL FULL PANEL - Abnormal    POCT pH, Arterial 7.24 (*)     POCT pCO2, Arterial 29 (*)     POCT pO2, Arterial 67 (*)     POCT SO2, Arterial 92 (*)     POCT Oxy Hemoglobin, Arterial 90.8 (*)     POCT Hematocrit Calculated, Arterial 26.0 (*)     POCT Sodium, Arterial 124 (*)     POCT Potassium, Arterial 4.5      POCT Chloride, Arterial 99      POCT Ionized Calcium, Arterial 1.06 (*)     POCT Glucose, Arterial 271 (*)     POCT Lactate, Arterial 4.4 (*)     POCT Base Excess, Arterial -13.7 (*)     POCT HCO3 Calculated, Arterial 12.4 (*)     POCT Hemoglobin, Arterial 8.8 (*)     POCT Anion Gap, Arterial 17      Patient Temperature 37.0      FiO2 80     BLOOD GAS ARTERIAL FULL PANEL - Abnormal    POCT pH, Arterial 7.21 (*)     POCT pCO2, Arterial 31 (*)     POCT pO2, Arterial 63 (*)     POCT SO2, Arterial 89 (*)     POCT Oxy Hemoglobin, Arterial 87.9 (*)     POCT Hematocrit Calculated, Arterial 22.0 (*)     POCT Sodium, Arterial 126 (*)     POCT Potassium,  Arterial 4.3      POCT Chloride, Arterial 98      POCT Ionized Calcium, Arterial 1.17      POCT Glucose, Arterial 268 (*)     POCT Lactate, Arterial 4.2 (*)     POCT Base Excess, Arterial -14.2 (*)     POCT HCO3 Calculated, Arterial 12.4 (*)     POCT Hemoglobin, Arterial 7.4 (*)     POCT Anion Gap, Arterial 20      Patient Temperature 37.0      FiO2 100     BLOOD GAS ARTERIAL FULL PANEL - Abnormal    POCT pH, Arterial 7.21 (*)     POCT pCO2, Arterial 32 (*)     POCT pO2, Arterial 54 (*)     POCT SO2, Arterial 81 (*)     POCT Oxy Hemoglobin, Arterial 80.1 (*)     POCT Hematocrit Calculated, Arterial 23.0 (*)     POCT Sodium, Arterial 124 (*)     POCT Potassium, Arterial 4.4      POCT Chloride, Arterial 98      POCT Ionized Calcium, Arterial 1.09 (*)     POCT Glucose, Arterial 289 (*)     POCT Lactate, Arterial 4.3 (*)     POCT Base Excess, Arterial -13.9 (*)     POCT HCO3 Calculated, Arterial 12.8 (*)     POCT Hemoglobin, Arterial 7.6 (*)     POCT Anion Gap, Arterial 18      Patient Temperature 37.0      FiO2 100     BLOOD GAS ARTERIAL FULL PANEL - Abnormal    POCT pH, Arterial 7.20 (*)     POCT pCO2, Arterial 33 (*)     POCT pO2, Arterial 128 (*)     POCT SO2, Arterial 100      POCT Oxy Hemoglobin, Arterial 98.8 (*)     POCT Hematocrit Calculated, Arterial 23.0 (*)     POCT Sodium, Arterial 124 (*)     POCT Potassium, Arterial 4.2      POCT Chloride, Arterial 97 (*)     POCT Ionized Calcium, Arterial 1.09 (*)     POCT Glucose, Arterial 296 (*)     POCT Lactate, Arterial 4.4 (*)     POCT Base Excess, Arterial -14.0 (*)     POCT HCO3 Calculated, Arterial 12.9 (*)     POCT Hemoglobin, Arterial 7.7 (*)     POCT Anion Gap, Arterial 18      Patient Temperature 37.0      FiO2 100     LACTATE DEHYDROGENASE - Abnormal     (*)    CBC WITH AUTO DIFFERENTIAL - Abnormal    WBC 16.2 (*)     nRBC 0.0      RBC 2.41 (*)     Hemoglobin 7.6 (*)     Hematocrit 23.5 (*)     MCV 98      MCH 31.5      MCHC 32.3      RDW 21.1 (*)      Platelets 77 (*)     Immature Granulocytes %, Automated 0.3      Immature Granulocytes Absolute, Automated 0.05     CBC - Abnormal    WBC 16.1 (*)     nRBC 0.0      RBC 2.43 (*)     Hemoglobin 7.8 (*)     Hematocrit 21.4 (*)     MCV 88      MCH 32.1      MCHC 36.4 (*)     RDW 20.2 (*)     Platelets 71 (*)    HEPATIC FUNCTION PANEL - Abnormal    Albumin 2.7 (*)     Bilirubin, Total 2.4 (*)     Bilirubin, Direct 1.3 (*)     Alkaline Phosphatase 81      ALT 13      AST 44 (*)     Total Protein 5.5 (*)    RENAL FUNCTION PANEL - Abnormal    Glucose 240 (*)     Sodium 128 (*)     Potassium 4.0      Chloride 97 (*)     Bicarbonate 17 (*)     Anion Gap 18      Urea Nitrogen 15      Creatinine 3.62 (*)     eGFR 15 (*)     Calcium 7.4 (*)     Phosphorus 5.1 (*)     Albumin 2.7 (*)    LACTATE - Abnormal    Lactate 5.5 (*)     Narrative:     Venipuncture immediately after or during the administration of Metamizole may lead to falsely low results. Testing should be performed immediately  prior to Metamizole dosing.   BLOOD GAS ARTERIAL FULL PANEL - Abnormal    POCT pH, Arterial 7.23 (*)     POCT pCO2, Arterial 35 (*)     POCT pO2, Arterial 84 (*)     POCT SO2, Arterial 98      POCT Oxy Hemoglobin, Arterial 96.4      POCT Hematocrit Calculated, Arterial 23.0 (*)     POCT Sodium, Arterial 125 (*)     POCT Potassium, Arterial 4.2      POCT Chloride, Arterial        POCT Ionized Calcium, Arterial 1.03 (*)     POCT Glucose, Arterial 286 (*)     POCT Lactate, Arterial 4.6 (*)     POCT Base Excess, Arterial -11.8 (*)     POCT HCO3 Calculated, Arterial 14.7 (*)     POCT Hemoglobin, Arterial 7.5 (*)     POCT Anion Gap, Arterial        Patient Temperature 37.0      FiO2 100     POCT GLUCOSE - Abnormal    POCT Glucose 30 (*)    POCT GLUCOSE - Abnormal    POCT Glucose 165 (*)    POCT GLUCOSE - Abnormal    POCT Glucose 193 (*)    POCT GLUCOSE - Abnormal    POCT Glucose 195 (*)    POCT GLUCOSE - Abnormal    POCT Glucose 261 (*)    POCT  GLUCOSE - Abnormal    POCT Glucose 253 (*)    POCT GLUCOSE - Abnormal    POCT Glucose 269 (*)    POCT GLUCOSE - Abnormal    POCT Glucose 293 (*)    POCT GLUCOSE - Abnormal    POCT Glucose 303 (*)    POCT GLUCOSE - Abnormal    POCT Glucose 333 (*)    POCT GLUCOSE - Abnormal    POCT Glucose 303 (*)    POCT GLUCOSE - Abnormal    POCT Glucose 258 (*)    POCT GLUCOSE - Abnormal    POCT Glucose 222 (*)    POCT GLUCOSE - Abnormal    POCT Glucose 189 (*)    BLOOD GAS VENOUS FULL PANEL UNSOLICITED - Abnormal    POCT pH, Venous 7.30 (*)     POCT pCO2, Venous 26 (*)     POCT pO2, Venous 47 (*)     POCT SO2, Venous 70      POCT Oxy Hemoglobin, Venous 68.5      POCT Hematocrit Calculated, Venous 20.0 (*)     POCT Sodium, Venous 128 (*)     POCT Potassium, Venous 4.9      POCT Chloride, Venous 101      POCT Ionized Calicum, Venous 1.21      POCT Glucose, Venous 40 (*)     POCT Lactate, Venous 1.8      POCT Base Excess, Venous -12.4 (*)     POCT HCO3 Calculated, Venous 12.8 (*)     POCT Hemoglobin, Venous 6.6 (*)     POCT Anion Gap, Venous 19.0      Patient Temperature 37.0     MANUAL DIFFERENTIAL - Abnormal    Neutrophils %, Manual 91.8      Lymphocytes %, Manual 1.6      Monocytes %, Manual 6.6      Eosinophils %, Manual 0.0      Basophils %, Manual 0.0      Seg Neutrophils Absolute, Manual 11.93 (*)     Lymphocytes Absolute, Manual 0.21 (*)     Monocytes Absolute, Manual 0.86      Eosinophils Absolute, Manual 0.00      Basophils Absolute, Manual 0.00      Total Cells Counted 122      RBC Morphology See Below      Hypochromia Mild      Flor Cells Many     MANUAL DIFFERENTIAL - Abnormal    Neutrophils %, Manual 91.6      Lymphocytes %, Manual 4.2      Monocytes %, Manual 4.2      Eosinophils %, Manual 0.0      Basophils %, Manual 0.0      Seg Neutrophils Absolute, Manual 13.74 (*)     Lymphocytes Absolute, Manual 0.63 (*)     Monocytes Absolute, Manual 0.63      Eosinophils Absolute, Manual 0.00      Basophils Absolute, Manual  0.00      Total Cells Counted 119      RBC Morphology See Below      Hypochromia Mild      Flor Cells Many     MANUAL DIFFERENTIAL - Abnormal    Neutrophils %, Manual 17.0      Bands %, Manual 47.0      Lymphocytes %, Manual 17.0      Monocytes %, Manual 11.0      Eosinophils %, Manual 0.0      Basophils %, Manual 0.0      Metamyelocytes %, Manual 6.0      Myelocytes %, Manual 2.0      Seg Neutrophils Absolute, Manual 2.75      Bands Absolute, Manual 7.61 (*)     Lymphocytes Absolute, Manual 2.75      Monocytes Absolute, Manual 1.78 (*)     Eosinophils Absolute, Manual 0.00      Basophils Absolute, Manual 0.00      Metamyelocytes Absolute, Manual 0.97      Myelocytes Absolute, Manual 0.32      Total Cells Counted 100      Neutrophils Absolute, Manual 10.36 (*)     RBC Morphology See Below      Polychromasia Mild      Teardrop Cells Few      Flor Cells Few     BLOOD CULTURE - Normal    Blood Culture No growth at 1 day     BLOOD CULTURE - Normal    Blood Culture No growth at 1 day     URINE CULTURE - Normal    Urine Culture No growth     LEGIONELLA ANTIGEN, URINE - Normal    L. pneumophila Urine Ag Negative     STREPTOCOCCUS PNEUMONIAE ANTIGEN, URINE - Normal    Streptococcus pneumoniae Ag, Urine Negative     MAGNESIUM - Normal    Magnesium 2.01     ACUTE TOXICOLOGY PANEL, BLOOD - Normal    Acetaminophen <10.0      Salicylate  <3      Alcohol <10     LACTATE - Normal    Lactate 2.0      Narrative:     Venipuncture immediately after or during the administration of Metamizole may lead to falsely low results. Testing should be performed immediately  prior to Metamizole dosing.   LIPASE - Normal    Lipase 18      Narrative:     Venipuncture immediately after or during the administration of Metamizole may lead to falsely low results. Testing should be performed immediately prior to Metamizole dosing.   FOLATE - Normal    Folate, Serum 15.7      Narrative:     Low           <3.4  Borderline 3.4-5.0  Normal         >5.0    Patients receiving more than 5 mg/day of biotin may have interference in test results. A sample should be taken no sooner than eight hours after previous dose. Contact the testing laboratory for additional information.    CREATINE KINASE - Normal    Creatine Kinase 86     MAGNESIUM - Normal    Magnesium 1.85     THYROXINE, FREE - Normal    Thyroxine, Free 1.31      Narrative:     Thyroxine Free testing is performed using different testing methodology at [facility]] than at other [facility]. Direct result comparisons should only be made within the same method.   THYROID PEROXIDASE (TPO) ANTIBODY - Normal    Thyroid Peroxidase (TPO) Antibody <28      Narrative:     Negative: <=60 U/mL  Positive:  >60 U/mL   VANCOMYCIN - Normal    Vancomycin 9.9      Narrative:     Vancomycin levels can be monitored according to area under the curve (AUC) or concentration (ug/mL). The preferred monitoring strategy is determined by the patient's renal function and indication for therapy.     For AUC monitoring, a random vancomycin level should be interpreted in the context of AUC rather than the concentration at a single point in time.    For concentration monitoring, a trough concentration drawn immediately prior to the next dose is preferred.       Therapeutic ranges using concentration-guided results:  Peak (all ages):                  30.0-40.0 ug/mL  Trough (all ages):                10.0-20.0 ug/mL              MAGNESIUM - Normal    Magnesium 1.82     STERILE FLUID CULTURE/SMEAR    Sterile Fluid Culture/Smear No growth to date      Gram Stain No polymorphonuclear leukocytes seen      Gram Stain No organisms seen     URINALYSIS WITH REFLEX CULTURE AND MICROSCOPIC    Narrative:     The following orders were created for panel order Urinalysis with Reflex Culture and Microscopic.  Procedure                               Abnormality         Status                     ---------                                -----------         ------                     Urinalysis with Reflex C...[[id number]]  Abnormal            Final result               Extra Urine Gray Tube[[id number]]                            Final result                 Please view results for these tests on the individual orders.   EXTRA URINE GRAY TUBE    Extra Tube Hold for add-ons.     TYPE AND SCREEN    ABO TYPE A      Rh TYPE POS      ANTIBODY SCREEN NEG     BODY FLUID CELL COUNT WITH DIFFERENTIAL    Narrative:     The following orders were created for panel order Body Fluid Cell Count With Differential.  Procedure                               Abnormality         Status                     ---------                               -----------         ------                     Body Fluid Cell Count[[id number]]        Abnormal            Final result               Body Fluid Differential[[id number]]                          Final result                 Please view results for these tests on the individual orders.   GLUCOSE, FLUID    Narrative:     The following orders were created for panel order Glucose, Fluid.  Procedure                               Abnormality         Status                     ---------                               -----------         ------                     Glucose, Fluid[[id number]]                                   Final result                 Please view results for these tests on the individual orders.   PROTEIN, TOTAL FLUID    Narrative:     The following orders were created for panel order Protein, Total Fluid.  Procedure                               Abnormality         Status                     ---------                               -----------         ------                     Protein, Total Fluid[[id number]]                             Final result                 Please view results for these tests on the individual orders.   GLUCOSE, FLUID    Glucose, Fluid 125      Narrative:     The performance characteristics of this  test have been validated on peritoneal/ascites, pleural, pericardial, drain, dialysate and liver/pancreatic cyst fluid by the [facility]. This test has not been approved by the FDA; however, such approval is not necessary.     PROTEIN, TOTAL FLUID    Protein, Total Fluid 0.6      Narrative:     The performance characteristics of this test have been validated on peritoneal/ascites,pleural, pericardial, drain, and liver/pancreatic cyst fluid by the [facility] laboratory. This test has not been approved by the FDA; however, such approval is not necessary.       VERAB/VERIFY ABORH    ABO TYPE A      Rh TYPE POS     UREA NITROGEN, URINE RANDOM    Urea Nitrogen, Urine Random 86      Creatinine, Urine Random 178.7      Urea Nitrogen/Creatinine Ratio 0.5     ALBUMIN, FLUID    Narrative:     The following orders were created for panel order Albumin, Fluid.  Procedure                               Abnormality         Status                     ---------                               -----------         ------                     Albumin, Fluid[[id number]]                                   Final result                 Please view results for these tests on the individual orders.   ALBUMIN, FLUID    Albumin, Fluid <0.5      Narrative:     The performance characteristics of this test have been validated on peritoneal/ascites,pleural, and pericardial fluid by the [facility] laboratory. This test has not been approved by the FDA; however, such approval is not necessary.   CALCIUM, IONIZED CRRT    POCT CRRT, Calcium Ionized 1.02     SLIDE REQUEST   BLOOD GAS VENOUS FULL PANEL   BLOOD GAS LACTIC ACID, VENOUS   BLOOD GAS ARTERIAL FULL PANEL   BLOOD GAS LACTIC ACID, VENOUS   BLOOD GAS LACTIC ACID, VENOUS   BLOOD GAS LACTIC ACID, VENOUS   BLOOD GAS LACTIC ACID, VENOUS   BLOOD GAS LACTIC ACID, VENOUS   BLOOD GAS ARTERIAL    BLOOD GAS LACTIC ACID, VENOUS   HAPTOGLOBIN   BLOOD GAS LACTIC ACID, VENOUS   CBC WITH AUTO DIFFERENTIAL   PLATELET COUNT   MAGNESIUM   MAGNESIUM   HEPATIC FUNCTION PANEL   HEPATIC FUNCTION PANEL   BLOOD UREA NITROGEN   CREATININE   ELECTROLYTE PANEL   CALCIUM, IONIZED   PHOSPHORUS   RENAL FUNCTION PANEL   RENAL FUNCTION PANEL   MAGNESIUM   MAGNESIUM   HEMOGLOBIN A1C   LACTATE   PREPARE RBC    PRODUCT CODE U4518N03      Unit Number [id number]      Unit ABO O      Unit RH POS      XM INTEP COMP      Dispense Status TR      Blood Expiration Date April 09, 2024 23:59 EDT      PRODUCT BLOOD TYPE 5100      UNIT VOLUME 350     POCT GLUCOSE METER   POCT GLUCOSE METER   POCT GLUCOSE METER   POCT GLUCOSE METER   POCT GLUCOSE METER   POCT GLUCOSE METER   POCT GLUCOSE METER   POCT GLUCOSE METER   POCT GLUCOSE METER   POCT GLUCOSE METER   MORPHOLOGY    RBC Morphology See Below      Target Cells Few      [place] Cells Few     BODY FLUID CELL DIFFERENTIAL    Neutrophils %, Manual, Fluid 76      Lymphocytes %, Manual, Fluid 7      Mono/Macrophages %, Manual, Fluid 17      Eosinophils %, Manual, Fluid 0      Basophils %, Manual, Fluid 0      Immature Granulocytes %, Manual, Fluid 0      Blasts %, Manual, Fluid 0      Unclassified Cells %, Manual, Fluid 0      Plasma Cells %, Manual, Fluid 0      Total Cells Counted, Fluid 100      Narrative:     Body Fluid cell differential reference ranges have not been established by [facility]. Based on published references.   PATH REVIEW-CELL CT,FLUID    Pathologist Review-Cell Count, Fluid        Value: Hemorrhagic specimen with mixed inflammatory cells.   PATH REVIEW-CBC DIFFERENTIAL     XR chest 1 view   Final Result   1. Redemonstrated multifocal hazy opacities within the bilateral   lungs. There is decreased aeration of the left upper lobe compared to   prior exam. These findings are again compatible with pulmonary   alveolar edema or infective/inflammatory  process.   2. Bilateral small pleural effusions.   3. Medical devices as described above.        I personally reviewed the images/study and I agree with the findings   as stated by Kennedy Garcia MD. This study was interpreted at   [facility], [place], [place].        MACRO:   None        Signed by: Marylou Hugo 4/10/2024 3:22 PM   Dictation workstation:   [id number]      XR chest 1 view   Final Result   1. Multifocal consolidative opacities with increased interstitial   markings. Findings may be due to severe edema or multifocal   infection. Aeration of the right upper lung is improved compared to   prior study.   2. Small left pleural effusion.   3. Medical devices as described                  Signed by: Clary Green 4/10/2024 8:09 AM   Dictation workstation:   [id number]      US liver with doppler   Final Result   Significantly limited examination.        1. Hepatic cirrhosis with large volume ascites. Limited evaluation of   the portal venous system.   2. Cholelithiasis without acute cholecystitis.             I personally reviewed the images/study and I agree with Dr. Omar Murphy and the findings as stated.        MACRO:   None        Signed by: Robert Lawler 4/10/2024 5:59 AM   Dictation workstation:   [id number]      XR abdomen 1 view   Preliminary Result   1.  Enteric tube with the distal tip projecting over the expected   location of the gastric antrum.   2. Nonobstructive bowel gas pattern with centralization of visualized   loops of large bowel consistent with previously observed large volume   ascites and diffuse anasarca.   3. Unchanged patchy alveolar opacities and small left-sided pleural   effusion better characterized on same day x-ray of the chest from   04/09/2024.        I personally reviewed the images/study and I agree with the findings   as stated by resident Omer Tsai. This study was interpreted   at [facility]  [place], [place].        MACRO:   None             Dictation workstation:   [id number]      XR chest 1 view   Final Result   1. Satisfactory positioning of right internal jugular approach   central venous catheter.   2. Unchanged appearance of patchy pulmonary opacities throughout the   bilateral lungs, particularly in the right upper lobe, with a   probable small left pleural effusion.   3. Hyperdense material overlying the stomach with multiple dilated   loops of bowel in the upper abdomen. Correlate with same day CT.        I personally reviewed the image(s)/study and resident interpretation   as stated by Dr. Lea Mccray MD. I agree with the findings as   stated. This study was interpreted at [place], [place].        MACRO:   None        Signed by: Servando Sosa 4/9/2024 4:09 PM   Dictation workstation:   [id number]      CT chest abdomen pelvis wo IV contrast   Final Result   Chest   1. Endotracheal tube in place with the tip terminating at the level   of the origin of the right mainstem bronchus.   2. Extensive consolidation/ground-glass opacities throughout both   lungs consistent with multifocal pneumonia.   3. Small bilateral pleural effusions.   4. Small pericardial effusion.        Abdomen-Pelvis   1. Large volume ascites.   2. Severe diffuse hepatic steatosis.   3. Cholelithiasis.   4. Severe body wall anasarca.        I personally reviewed the images/study and I agree with the findings   as stated by resident physician Rosales Vernon MD. This study was   interpreted at [place], [place].        The above information was relayed to Ordering provider Perri Watts   by Rosales Vernon MD via epic haiku at 2:30 p.m. on 04/09/2024 with   readback verification.        MACRO:   None        Signed by: Maxime Wilkes 4/10/2024 12:20 PM   Dictation workstation:   [id number]      CT  head wo IV contrast   Final Result   No acute intracranial hemorrhage, mass effect, or CT apparent acute   infarct.        Mild nonspecific patchy white matter hypodensity involving the   bilateral cerebral hemispheres that may reflect age advanced chronic   small vessel ischemic disease. MRI may be helpful for further   characterization as clinically warranted.        MACRO:   None        Signed by: Bob Mitchell 4/9/2024 2:30 PM   Dictation workstation:   [id number]      XR chest 1 view   Final Result   1. Interval intubation with endotracheal tube 0.6 cm above the   faby, recommend slight retraction.   2. Worsening patchy alveolar opacities throughout the bilateral   lungs, particularly in the right upper and middle lobes suspicious   for multifocal pneumonia likely with a component of pulmonary edema.        I personally reviewed the image(s)/study and resident interpretation   as stated by Dr. Lea Mccray MD. I agree with the findings as   stated. This study was interpreted at [facility], [place], [place].        MACRO:   None        Signed by: Servando Sosa 4/9/2024 2:19 PM   Dictation workstation:   [id number]      XR chest 1 view   Final Result   Extensive multifocal dense airspace disease compatible with   multifocal pneumonia. CT or radiographic follow-up to resolution is   advised        MACRO:   None        Signed by: Servando Sosa 4/9/2024 12:49 PM   Dictation workstation:   [id number]      Point of Care Ultrasound    (Results Pending)       ED Course & MDM   Diagnoses as of 04/10/24 2059   Hypoglycemia   Acute hypoxic respiratory failure (CMS/HCC)   Decompensation of cirrhosis of liver (CMS/HCC)   Hypothyroidism, unspecified type   Gastrointestinal hemorrhage, unspecified gastrointestinal hemorrhage type   Acute kidney injury (CMS/HCC)       Medical Decision Making:  This is a 44yo female with hx alcohol induced liver cirrhosis and recent  hospitalization with hepatic encephalopathy/upper GIB 2/2 varices, presenting to the ED with AMS, found to be hypoglycemic and hypotensive per EMS. Hypoglycemia and hypotension correctly and pt still altered, given hx and elevated ammonia, suspect component of hepatic encephalopathy. Likely also a component of septic shock in the setting of multifocal pneumonia. Peripheral vasopressors initiated after pt failed to be fluid responsive (also s/p albumin), transferred to central access when central line and arterial line placed. Pt had worsening oxygenation and mentation while in the ED, necessitating endotracheal intubation and mechanical ventilation. Paracentesis obtained to rule out SBP. Pt also anemic with hemoglobin of 6, requiring blood transfusion, no obvious active bleeding noted on rectal exam. Given IV PPI, lactulose started. Also given high dose thiamine given etoh use and hypoglycemia. Additionally pt found to be hypothyroid, and synthroid given as well as stress-dose steroids. Patient's fiance at bedside consented for all procedures and updated along the way on patient's care. All questions were answered. Patient discussed with ICU attending and admitted to MICU.     Social Determinants Limiting Care: unable to confirm fiance/ POA status, no other family listed in chart or contact information provided for NOK    Disposition:  Admit, ICU.     Patient seen and discussed with attending physician.    Perri Watts MD PGY3  Emergency Medicine      Procedures ? SmartLinks last updated 4/10/2024 8:59 PM          Perri Watts MD  Resident  04/10/24 2059

## 2024-04-10 NOTE — PROGRESS NOTES
Pharmacy Medication History Review    Vera Beard is a 43 y.o. female admitted for Hypoglycemia. Pharmacy reviewed the patient's [id number] medications and allergies for accuracy.    The list below reflects the updated PTA list. Comments regarding how patient may be taking medications differently can be found in the Admit Orders Activity  Prior to Admission Medications   Prescriptions Last Dose Informant   escitalopram (Lexapro) 5 mg tablet  Spouse/Significant Other   Sig: Take 1 tablet (5 mg) by mouth once daily.   folic acid (Folvite) 1 mg tablet  Spouse/Significant Other   Sig: Take 1 tablet (1 mg) by mouth once daily.   pantoprazole (ProtoNix) 40 mg EC tablet  Spouse/Significant Other   Sig: Take 1 tablet (40 mg) by mouth 2 times a day.   thiamine 100 mg tablet  Spouse/Significant Other   Si tablet (100 mg) by Enteral route 3 times a day.   traZODone (Desyrel) 50 mg tablet  Spouse/Significant Other   Sig: Take 1 tablet (50 mg) by mouth as needed at bedtime for sleep.      Facility-Administered Medications: None       The list below reflects the updated allergy list. Please review each documented allergy for additional clarification and justification.  Allergies  Reviewed by Ramez Monsivais RN on 2024   No Known Allergies         Patient was unable to be assessed for M2B at discharge. Pharmacy has been updated to [facility] in [place]. Patient intubated at time of interview, please reassess need for M2B closer to discharge.     Sources used to complete the med history include out patient fill history, OARRS, and phone interview with brittny Hess historian- able to confirm medications viewable in dispense history      Below are additional concerns with the patient's PTA list.  N/A    Anoop Hassan PharmD  Transitions of Care Pharmacist  [facility]  Please reach out via Secure Chat for questions, or if no response call Waze or [employer]

## 2024-04-10 NOTE — PROGRESS NOTES
SOCIAL WORK NOTE   Patient not current able to participate. Team asked to assist with NOK. SO is listed, but not appropriate decision maker. SW found daughter in chart Sada Greenfield ([phone]). Team made successful contact. Social work to follow.  JENNIFER Pham, LISW-S ([id number])

## 2024-04-10 NOTE — CONSULTS
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    Hemoglobin 8.4 (L) 12.0 - 16.0 g/dL    Hematocrit 23.2 (L) 36.0 - 46.0 %    MCV 87 80 - 100 fL    MCH 31.5 26.0 - 34.0 pg    MCHC 36.2 (H) 32.0 - 36.0 g/dL    RDW 18.8 (H) 11.5 - 14.5 %    Platelets 90 (L) 150 - 450 x10*3/uL    Immature Granulocytes %, Automated 1.3 (H) 0.0 - 0.9 %    Immature Granulocytes Absolute, Automated 0.17 0.00 - 0.70 x10*3/uL   Manual Differential   Result Value Ref Range    Neutrophils %, Manual 91.8 40.0 - 80.0 %    Lymphocytes %, Manual 1.6 13.0 - 44.0 %    Monocytes %, Manual 6.6 2.0 - 10.0 %    Eosinophils %, Manual 0.0 0.0 - 6.0 %    Basophils %, Manual 0.0 0.0 - 2.0 %    Seg Neutrophils Absolute, Manual 11.93 (H) 1.20 - 7.00 x10*3/uL    Lymphocytes Absolute, Manual 0.21 (L) 1.20 - 4.80 x10*3/uL    Monocytes Absolute, Manual 0.86 0.10 - 1.00 x10*3/uL    Eosinophils Absolute, Manual 0.00 0.00 - 0.70 x10*3/uL    Basophils Absolute, Manual 0.00 0.00 - 0.10 x10*3/uL    Total Cells Counted 122     RBC Morphology See Below     Hypochromia Mild     Flor Cells Many    POCT GLUCOSE   Result Value Ref Range    POCT Glucose 261 (H) 74 - 99 mg/dL   Blood Gas Arterial Full Panel   Result Value  Ref Range    POCT pH, Arterial 7.23 (LL) 7.38 - 7.42 pH    POCT pCO2, Arterial 26 (L) 38 - 42 mm Hg    POCT pO2, Arterial 76 (L) 85 - 95 mm Hg    POCT SO2, Arterial 96 94 - 100 %    POCT Oxy Hemoglobin, Arterial 94.4 94.0 - 98.0 %    POCT Hematocrit Calculated, Arterial 28.0 (L) 36.0 - 46.0 %    POCT Sodium, Arterial 124 (L) 136 - 145 mmol/L    POCT Potassium, Arterial 5.2 3.5 - 5.3 mmol/L    POCT Chloride, Arterial 99 98 - 107 mmol/L    POCT Ionized Calcium, Arterial 1.03 (L) 1.10 - 1.33 mmol/L    POCT Glucose, Arterial 239 (H) 74 - 99 mg/dL    POCT Lactate, Arterial 3.5 (H) 0.4 - 2.0 mmol/L    POCT Base Excess, Arterial -15.2 (L) -2.0 - 3.0 mmol/L    POCT HCO3 Calculated, Arterial 10.9 (L) 22.0 - 26.0 mmol/L    POCT Hemoglobin, Arterial 9.2 (L) 12.0 - 16.0 g/dL    POCT Anion Gap, Arterial 19 10 - 25 mmo/L    Patient Temperature 37.0 degrees Celsius    FiO2 100 %   POCT GLUCOSE   Result Value Ref Range    POCT Glucose 253 (H) 74 - 99 mg/dL   CBC and Auto Differential   Result Value Ref Range    WBC 15.0 (H) 4.4 - 11.3 x10*3/uL    nRBC 0.0 0.0 - 0.0 /100 WBCs    RBC 2.96 (L) 4.00 - 5.20 x10*6/uL    Hemoglobin 9.3 (L) 12.0 - 16.0 g/dL    Hematocrit 25.4 (L) 36.0 - 46.0 %    MCV 86 80 - 100 fL    MCH 31.4 26.0 - 34.0 pg    MCHC 36.6 (H) 32.0 - 36.0 g/dL    RDW 19.5 (H) 11.5 - 14.5 %    Platelets 90 (L) 150 - 450 x10*3/uL    Immature Granulocytes %, Automated 0.9 0.0 - 0.9 %    Immature Granulocytes Absolute, Automated 0.13 0.00 - 0.70 x10*3/uL   Coagulation Screen   Result Value Ref Range    Protime 22.5 (H) 9.8 - 12.8 seconds    INR 2.0 (H) 0.9 - 1.1    aPTT 61 (H) 27 - 38 seconds   Hepatic Function Panel   Result Value Ref Range    Albumin 1.7 (L) 3.4 - 5.0 g/dL    Bilirubin, Total 2.2 (H) 0.0 - 1.2 mg/dL    Bilirubin, Direct 0.8 (H) 0.0 - 0.3 mg/dL    Alkaline Phosphatase 124 (H) 33 - 110 U/L    ALT 19 7 - 45 U/L    AST 57 (H) 9 - 39 U/L    Total Protein 5.8 (L) 6.4 - 8.2 g/dL   Magnesium   Result Value Ref Range     Magnesium 1.85 1.60 - 2.40 mg/dL   TSH   Result Value Ref Range    Thyroid Stimulating Hormone 6.31 (H) 0.44 - 3.98 mIU/L   T4, free   Result Value Ref Range    Thyroxine, Free 1.31 0.78 - 1.48 ng/dL   T3, free   Result Value Ref Range    Triiodothyronine, Free 0.3 (L) 2.3 - 4.2 pg/mL   Phosphorus   Result Value Ref Range    Phosphorus 7.2 (H) 2.5 - 4.9 mg/dL   Basic Metabolic Panel   Result Value Ref Range    Glucose 238 (H) 74 - 99 mg/dL    Sodium 125 (L) 136 - 145 mmol/L    Potassium 5.0 3.5 - 5.3 mmol/L    Chloride 99 98 - 107 mmol/L    Bicarbonate 11 (L) 21 - 32 mmol/L    Anion Gap 20 10 - 20 mmol/L    Urea Nitrogen 17 6 - 23 mg/dL    Creatinine 4.61 (H) 0.50 - 1.05 mg/dL    eGFR 11 (L) >60 mL/min/1.73m*2    Calcium 7.3 (L) 8.6 - 10.6 mg/dL   Manual Differential   Result Value Ref Range    Neutrophils %, Manual 91.6 40.0 - 80.0 %    Lymphocytes %, Manual 4.2 13.0 - 44.0 %    Monocytes %, Manual 4.2 2.0 - 10.0 %    Eosinophils %, Manual 0.0 0.0 - 6.0 %    Basophils %, Manual 0.0 0.0 - 2.0 %    Seg Neutrophils Absolute, Manual 13.74 (H) 1.20 - 7.00 x10*3/uL    Lymphocytes Absolute, Manual 0.63 (L) 1.20 - 4.80 x10*3/uL    Monocytes Absolute, Manual 0.63 0.10 - 1.00 x10*3/uL    Eosinophils Absolute, Manual 0.00 0.00 - 0.70 x10*3/uL    Basophils Absolute, Manual 0.00 0.00 - 0.10 x10*3/uL    Total Cells Counted 119     RBC Morphology See Below     Hypochromia Mild     [place] Cells Many    POCT GLUCOSE   Result Value Ref Range    POCT Glucose 269 (H) 74 - 99 mg/dL                                                                              Imaging           XR chest 1 view    Result Date: 4/10/2024  Interpreted By:  Yessi Green [facility], STUDY: XR CHEST 1 VIEW; 4/10/2024 7:49 am   INDICATION: Signs/Symptoms:dyspnea.   COMPARISON: Radiograph dated 04/09/2024   ACCESSION NUMBER(S): [id number]   ORDERING CLINICIAN: VIKAS BARRY   FINDINGS: ET tube is terminating 3.9 cm from the faby. Enteric tube is in place with  the tip outside the field of view. Right IJ central venous catheter is projecting over right atrium.   The cardiac silhouette size is within normal limits.   Multifocal consolidation in bilateral lungs with slight improvement in the aeration of the right upper lung. Small left pleural effusion. No sizable pneumothorax.   No acute osseous change.       1. Multifocal consolidative opacities with increased interstitial markings. Findings may be due to severe edema or multifocal infection. Aeration of the right upper lung is improved compared to prior study. 2. Small left pleural effusion. 3. Medical devices as described       Signed by: Clary Green 4/10/2024 8:09 AM Dictation workstation:   [id number]    US liver with doppler    Result Date: 4/10/2024  Interpreted By:  Robert Lawler  and Katherine Nelson STUDY: US LIVER WITH DOPPLER;  4/10/2024 4:12 am   INDICATION: Signs/Symptoms:SBP, alcohol cirrhosis decompensated; with dopplers.   COMPARISON: CT cap 04/09/2024   ACCESSION NUMBER(S): [id number]   ORDERING CLINICIAN: FERNANDO GUIDRY   TECHNIQUE: Multiple images of the right upper quadrant were obtained.  Gray scale, color Doppler and spectral Doppler waveform analysis was performed.   FINDINGS: Examination is significantly limited secondary to patient body habitus and extensive ascites. Within the limitation:   LIVER: The liver is small, heterogeneous, with nodular contour measuring 13.9 cm in the craniocaudal dimension.   GALLBLADDER: Cholelithiasis is seen without gallbladder dilatation, wall thickening, or pericholecystic fluid. Sonographic Jackson's sign is negative.   BILIARY SYSTEM: No significant intrahepatic biliary ductal dilatation. The CBD was not seen.   DOPPLER EVALUATION:   HEPATIC ARTERIES: Poorly visualized.   PORTAL VEIN: Limited evaluation of the portal vein system and branches. The main portal vein is patent measures 0.8 cm with peak velocity of 19 cm/s.   HEPATIC VEIN: The right,  middle and left hepatic veins are patent and demonstrate triphasic antegrade flow. IVC appears also patent.   PANCREAS: The pancreas is poorly visualized due to overlying bowel gas.   RIGHT KIDNEY: The right kidney measures 7.9 cm in length. No overt hydronephrosis or renal calculi are seen.   SPLEEN: Poorly visualized.   PERITONEUM AND RETROPERITONEUM: Large volume ascites is seen.       Significantly limited examination.   1. Hepatic cirrhosis with large volume ascites. Limited evaluation of the portal venous system. 2. Cholelithiasis without acute cholecystitis.     I personally reviewed the images/study and I agree with Dr. Omar Murphy and the findings as stated.   MACRO: None   Signed by: Robert Lawler 4/10/2024 5:59 AM Dictation workstation:   [id number]    ECG 12 lead    Result Date: 4/10/2024  Sinus tachycardia with Premature atrial complexes with Aberrant conduction Low voltage QRS Nonspecific T wave abnormality Abnormal ECG No previous ECGs available See ED provider note for full interpretation and clinical correlation Confirmed by Cesar Bentley ([id number]) on 4/10/2024 2:36:33 AM    XR abdomen 1 view    Result Date: 4/9/2024  Interpreted By:  Omer Tsai, STUDY: XR ABDOMEN 1 VIEW;  4/9/2024 6:19 pm   INDICATION: Signs/Symptoms:OG placement.   COMPARISON: CT chest abdomen pelvis 04/09/2024.   ACCESSION NUMBER(S): [id number]   ORDERING CLINICIAN: JAIME HE   FINDINGS: Enteric tube courses midline below the level of the diaphragm with the tip projecting over the expected location of the gastric antrum.   Nonobstructive bowel gas pattern. There is centralization of visualized loops of large bowel consistent with large volume ascites and diffuse anasarca on CT from 04/09/2024. Limited evaluation of pneumoperitoneum on supine imaging, however no gross evidence of free air is noted.   Unchanged patchy alveolar opacities throughout the bilateral lower lungs compared to prior examination with small  left pleural effusion.   Osseous structures demonstrate no acute bony changes.       1.  Enteric tube with the distal tip projecting over the expected location of the gastric antrum. 2. Nonobstructive bowel gas pattern with centralization of visualized loops of large bowel consistent with previously observed large volume ascites and diffuse anasarca. 3. Unchanged patchy alveolar opacities and small left-sided pleural effusion better characterized on same day x-ray of the chest from 04/09/2024.   I personally reviewed the images/study and I agree with the findings as stated by resident Omer Tsai. This study was interpreted at [place], [place].   MACRO: None     Dictation workstation:   [id number]    XR chest 1 view    Result Date: 4/9/2024  Interpreted By:  Kosmas, Servando,  and Wolsey Lea STUDY: XR CHEST 1 VIEW;  4/9/2024 3:35 pm   INDICATION: Signs/Symptoms:central line placement, RIJ.   COMPARISON: Same day chest radiograph time stamped 1:34 p.m.   ACCESSION NUMBER(S): [id number]   ORDERING CLINICIAN: RYAN GUERRERO   FINDINGS: Single AP view of the chest was provided.   Interval retraction of an endotracheal tube with tip now terminating 3.2 cm above the faby, in satisfactory positioning. Interval placement of a right internal jugular approach central venous catheter with tip overlying the superior cavoatrial junction.   CARDIOMEDIASTINAL SILHOUETTE: Similar obscuration of much of the cardiac border due to overlying pulmonary opacities.   LUNGS: Similar dense patchy alveolar opacities throughout the bilateral lungs, primarily in the right upper lobe. Probable small left pleural effusion. No pneumothorax.   ABDOMEN: Hyperdense material overlying the stomach. Mild dilation of multiple loops of bowel in the upper abdomen.   BONES: No acute osseous changes.       1. Satisfactory positioning of right internal jugular approach central venous catheter.  2. Unchanged appearance of patchy pulmonary opacities throughout the bilateral lungs, particularly in the right upper lobe, with a probable small left pleural effusion. 3. Hyperdense material overlying the stomach with multiple dilated loops of bowel in the upper abdomen. Correlate with same day CT.   I personally reviewed the image(s)/study and resident interpretation as stated by Dr. Lea Mccray MD. I agree with the findings as stated. This study was interpreted at [facility], [place], [place].   MACRO: None   Signed by: Servando Sosa 4/9/2024 4:09 PM Dictation workstation:   [id number]    CT chest abdomen pelvis wo IV contrast    Result Date: 4/9/2024  Interpreted By:  Rosales Vernon, STUDY: CT CHEST ABDOMEN PELVIS WO CONTRAST;  4/9/2024 2:13 pm   INDICATION: Signs/Symptoms:Sepsis. Presenting with altered mental status.   COMPARISON: None.   ACCESSION NUMBER(S): [id number]   ORDERING CLINICIAN: BESS SEGOVIA   TECHNIQUE: CT of the chest, abdomen and pelvis was performed. Contiguous axial images were obtained at 3 mm slice thickness through the chest, abdomen and pelvis. Coronal and sagittal reconstructions at 3 mm slice thickness were performed. No intravenous contrast was administered.   FINDINGS: Please note that the study is limited without intravenous contrast.   CHEST:   LUNG/PLEURA/LARGE AIRWAYS: Endotracheal tube in place with the tip terminating at the level of the faby/right mainstem bronchus. Mild secretions in the distal trachea.   There are extensive confluent and patchy consolidations and ground-glass opacities throughout the bilateral lungs consistent with multifocal pneumonia. Small bilateral pleural effusions, right-greater-than-left. No pneumothorax.   VESSELS: Aorta and pulmonary arteries are normal caliber.  No significant atherosclerotic changes of the thoracic aorta. Mild coronary artery calcifications are present.   HEART: The heart is normal  in size. Small pericardial effusion.   MEDIASTINUM AND CHARIS: No mediastinal, hilar, or axillary lymphadenopathy. The esophagus is within normal limits.   CHEST WALL AND LOWER NECK: Severe diffuse chest wall edema is present. The visualized thyroid gland appears within normal limits.   ABDOMEN:   LIVER: Normal size and smooth contour. Severe diffuse hepatic steatosis. No focal parenchymal abnormalities are seen.   BILE DUCTS: The intrahepatic and extrahepatic bile ducts are nondilated.   GALLBLADDER: Cholelithiasis. The gallbladder is nondistended.   PANCREAS: The pancreas is unremarkable without evidence of ductal dilation or masses.   SPLEEN: The spleen is normal in size and without evidence of focal lesions.   ADRENAL GLANDS: Within normal limits.   KIDNEYS AND URETERS: Bilateral kidneys are mildly atrophic. Punctate 0.3 cm calculus at the right upper pole. No hydronephrosis.   PELVIS:   BLADDER: The bladder is decompressed with Tadeo catheter in place.   REPRODUCTIVE ORGANS: The uterus is present. No suspicious pelvic masses.   BOWEL: The stomach is unremarkable.  The small and large bowel are normal in caliber and demonstrate no wall thickening.  The appendix is not definitively visualized.   VESSELS: There is no aneurysmal dilatation of the abdominal aorta. The IVC is within normal limits. Mild atherosclerotic changes of the abdominal aorta and its branch vessels.   PERITONEUM/RETROPERITONEUM/LYMPH NODES: There is large volume ascites. No loculated fluid collections or intraperitoneal free air. No abdominopelvic lymphadenopathy is present.   ABDOMINAL WALL: Severe diffuse abdominal wall anasarca.   BONES: No acute osseous abnormality or suspicious osseous lesions.       Chest 1. Endotracheal tube in place with the tip terminating at the level of the faby/right mainstem bronchus. Recommend retracting tube. 2. Extensive consolidation/ground-glass opacities throughout both lungs consistent with multifocal  pneumonia. 3. Small bilateral pleural effusions. 4. Small pericardial effusion.   Abdomen-Pelvis 1. Large volume ascites. 2. Severe diffuse hepatic steatosis. 3. Cholelithiasis. 4. Severe body wall anasarca.   I personally reviewed the images/study and I agree with the findings as stated by resident physician Rosales Vernon MD. This study was interpreted at [facility], [place], [place].   The above information was relayed to Ordering provider Perri Johnson by Rosales Vernon MD via epic haiku at 2:30 p.m. on 04/09/2024 with readback verification.   MACRO: None     Dictation workstation:   [id number]    CT head wo IV contrast    Result Date: 4/9/2024  Interpreted By:  Bob Mitchell, STUDY: CT HEAD WO IV CONTRAST;  4/9/2024 2:08 pm   INDICATION: Signs/Symptoms:AMS.   COMPARISON: None.   ACCESSION NUMBER(S): [id number]   ORDERING CLINICIAN: PERRI JOHNSON   TECHNIQUE: Noncontrast axial CT scan of head was performed.   FINDINGS: Parenchyma: There is no intracranial hemorrhage. The grey-white differentiation is intact. There is no mass effect or midline shift. Patchy nonspecific white matter hypodensities involving the deep white matter of the bilateral cerebral hemispheres.   CSF Spaces: The ventricles, sulci and basal cisterns are within normal limits for age.   Extra-Axial Fluid: There is no extraaxial fluid collection.   Calvarium: The calvarium is unremarkable.   Paranasal sinuses: Visualized paranasal sinuses are clear.   Mastoids: Clear.   Orbits: Normal.   Soft tissues: Unremarkable.       No acute intracranial hemorrhage, mass effect, or CT apparent acute infarct.   Mild nonspecific patchy white matter hypodensity involving the bilateral cerebral hemispheres that may reflect age advanced chronic small vessel ischemic disease. MRI may be helpful for further characterization as clinically warranted.   MACRO: None   Signed by: Bob Mitchell 4/9/2024 2:30 PM Dictation workstation:    [id number]    XR chest 1 view    Result Date: 4/9/2024  Interpreted By:  Kosmas, Servando,  and Shazia Lea STUDY: XR CHEST 1 VIEW;  4/9/2024 1:43 pm   INDICATION: Signs/Symptoms:Intubation.   COMPARISON: Chest radiograph 04/09/2024 time stamped 11:53 a.m..   ACCESSION NUMBER(S): [id number]   ORDERING CLINICIAN: BESS SEGOVIA   FINDINGS: Single AP supine radiograph of the chest was provided.   Interval intubation with endotracheal tube tip terminating 0.6 cm above the faby.   CARDIOMEDIASTINAL SILHOUETTE: Much of the cardiomediastinal silhouette is obscured by adjacent pulmonary opacities.   LUNGS: Increasing patchy alveolar pulmonary opacities throughout the bilateral lungs, particularly worsening in the right upper lung field and adjacent to the right heart border. Probable small left pleural effusion. No pneumothorax.   ABDOMEN: No remarkable upper abdominal findings.   BONES: No acute osseous changes.       1. Interval intubation with endotracheal tube 0.6 cm above the faby, recommend slight retraction. 2. Worsening patchy alveolar opacities throughout the bilateral lungs, particularly in the right upper and middle lobes suspicious for multifocal pneumonia likely with a component of pulmonary edema.   I personally reviewed the image(s)/study and resident interpretation as stated by Dr. Lea Mccray MD. I agree with the findings as stated. This study was interpreted at [facility], [place], [place].   MACRO: None   Signed by: Servando Sosa 4/9/2024 2:19 PM Dictation workstation:   [id number]    XR chest 1 view    Result Date: 4/9/2024  Interpreted By:  Servando Sosa, STUDY: XR CHEST 1 VIEW;  4/9/2024 12:02 pm   INDICATION: Signs/Symptoms:Hypoxia.   COMPARISON: None.   ACCESSION NUMBER(S): [id number]   ORDERING CLINICIAN: BESS SEGOVIA   FINDINGS:         CARDIOMEDIASTINAL SILHOUETTE: Cardiomediastinal silhouette is normal in size and configuration.    LUNGS: There is extensive multifocal dense airspace disease worse in the right upper lung and the bases bilaterally.   ABDOMEN: No remarkable upper abdominal findings.   BONES: No acute osseous changes.       Extensive multifocal dense airspace disease compatible with multifocal pneumonia. CT or radiographic follow-up to resolution is advised   MACRO: None   Signed by: Servando Sosa 4/9/2024 12:49 PM Dictation workstation:   [id number]                                                                             GI Procedures   DATE OF EXAM: Mar  8 2024  9:00AM    U   1233  -  US ABD LIVER VASCULAR  / ACCESSION #  [id number]   IMPRESSION:   Patent hepatic vasculature with appropriately directed flow.   Diffuse hepatic steatosis. Cholelithiasis.     Upper GI endoscopy   3/1/2024  Findings:       The examined esophagus was normal.        Type 1 isolated gastric varices (IGV1, varices located in the fundus)        with no bleeding were found in the gastric fundus. They were large in        largest diameter. EUS was performed and given large size and extent,        decision was made to perform endoscoic glue injection rather than EUS        guided coil injection. The regular GIF was inserted and gastric        varices were successfully injected with 1 mL cyanoacrylate for        eradication of varices.        A moderate post-ulcer deformity was found in the duodenal bulb.        The second portion of the duodenum was normal.   Impression:            - EUS with large gastric varices.                          - Normal esophagus.                          - Type 1 isolated gastric varices (IGV1, varices                          located in the fundus), without bleeding. Injected.                          - Duodenal deformity.                          - Normal second portion of the duodenum.                          - No specimens collected.   Estimated Blood Loss:  Estimated blood loss: none.   Recommendation:        -  Return patient to ICU for ongoing care.                          - NPO.                          - Patient has a contact number available for                          emergencies. The signs and symptoms of potential                          delayed complications were discussed with the                          patient. Return to normal activities tomorrow.                          Written discharge instructions were provided to the                          patient.     Attending Participation:        I personally performed the entire procedure.   Scope In: 1:15:46 PM   Scope Out: 2:28:54 PM   MD Lashell Kent MD   3/1/2024 6:04:34 PM   This report has been signed electronically by MD Kaylen Myers MD   ASSESSMENT / PLAN                  ASSESSMENT/PLAN:    Ms. Vera Beard is a 43yoF with alcohol use disorder c/b cirrhosis, HE (intermittently refuses lactulose), Hx GIB (gastric varices, no banding, injected with cyanoacrylate on 3/1/24) and chronic pancreatitis who presented to ED 4/9 for AMS and lethargy, intubated for airway protection. Currently in MICU on vasopressor support and mechanical ventilation for suspected septic shock from multifocal pneumonia + hemorrhagic shock  given Hgb of 6.3 on admission. Recent admission at [facility] from 2/29-3/9 for hemetemesis --> hemorrhagic shock from upper GIB w/ Hgb of 3.2, lactate 13, hospital course noteworthy for cardiac arrest due to acidosis during emergent intubation, ROSC after 2 rounds with no neuro deficits. Left AMA in setting of downtrending Hgb.    Acute decompensated liver cirrhosis likely due to septic shock, HE?  Metabolic encephalopathy due to decompensated cirrhosis, Hx HE, metabolic derangements  Suspected septic shock due to multifocal pneumonia + SBP  AHRF due to above  Possible HRS  Acute on chronic macrocytic anemia s/p 2 units PRBCs 4/9  Oliguric KEITH  Thrombocytopenia and coagulopathy due to Hx cirrhosis,  shock  Lactic acidosis  -SCR of 4.8 on admission (baseline 1)  -diagnostic paracentesis in ED w/ 2,072,000 RBC, 17,078 WBC w. 76% PMNs; 4691 PMNs after correction for RBCs   -Hb 6.3 on admission, previously 3.2 on admission at [facility], baseline ~11; s/p 2 unit PRBCs. No obvious bleeding to date  -US doppler --> Hepatic cirrhosis with large volume ascites. Limited evaluation of the portal venous system.  -75g 25% albumin, 1L LR in ED    MELD 3.0: 37 at 4/10/2024  5:11 AM  MELD-Na: 34 at 4/10/2024  5:11 AM  Calculated from:  Serum Creatinine: 4.61 mg/dL (Using max of 3 mg/dL) at 4/10/2024  5:11 AM  Serum Sodium: 125 mmol/L at 4/10/2024  5:11 AM  Total Bilirubin: 2.2 mg/dL at 4/10/2024  5:11 AM  Serum Albumin: 1.7 g/dL at 4/10/2024  5:11 AM  INR(ratio): 2.0 at 4/10/2024  5:11 AM  Age at listing (hypothetical): 43 years  Sex: Female at 4/10/2024  5:11 AM   - Ascites: present, large amount, last tap 24  +SBP w/ correction from traumatic tap. No prior history SBP- no fluid studies to review, no ascites on CT a/P or ultrasound. Will need SBP ppx once off antibiotics  - Volume: Diffusely overloaded, anasarca. No home diuretics, would not start at this time given severe KEITH  - EV: gastric varices c/b hemorrhagic shock [facility] admission 3/9, present on EGD 3/1/24 IGV1 s/p glue embolization  - HE: unable to assess given sedation. No clear history HE, no home lactulose on med rec. Currently on lactulose/rifaximin  - HCC screenin/10 Ultrasound liver without lesions, no AFP sent  - Transplant: Defer given critical illness.     Recommendations  -Agree w/ management per MICU team  -Continue empiric abx via MICU team  -Please try to add on albumin to fluid sample from paracentesis   -Continue Rifaximin 550 mg twice daily, Lactulose 20 g every 2 hours until bowel movements achieved; will then titrate to 3-4 bowel movements per day  -Monitor for overt GIB  -Transfuse Hb<7, goal Hgb 7-8, avoid over transfusion as can worsen  portal HTN  -Will need SBP ppx once off broad spectrum antibiotics    This note was generated by a resident and staffed w/ fellow and attending physician.  Basilia Alcazar, DO    Thank you for the consultation. Hepatology will continue to follow.    - During weekday hours of 7am- 5pm, please do not hesitate to contact me on "[email or web address] Chat or page [phone] if there are any further questions   - After hours, on weekends, and on holidays, please page the [place] GI [place] at [phone]. Thank you.     Sherie Ramos MD  PGY4 Gastroenterology Fellow  [facility]

## 2024-04-10 NOTE — CONSULTS
"Nutrition Initial Assessment:   Nutrition Assessment    Reason for Assessment: Tube feeding recommendations    Patient is a 43 y.o. female presenting with lethargy and a change in mental status.  Found to have low hemoglobin and hematemesis.  She was intubated for airway protection.  Remains intubated at visit.  She is sedated with fentanyl and propofol.  She is on levo and vaso for pressure support.  Also on octreotide drip and sodium bicarb at 125mls/hr.  Concern as well for HRS-- renal consulted.     Past medical history includes EtOH cirrhosis, HE, GIB    She has an OGT in place for enteral access.  Current propofol rate is 12mls/hr which provides 317kcals daily from fat.     Anthropometrics:  Height: 165.1 cm (5' 5\")   Weight: 71.9 kg (158 lb 8.2 oz)   BMI (Calculated): 26.38  IBW/kg (Dietitian Calculated): 56.8 kg  Percent of IBW: 127 %     Weight History:     Weight noted from [facility] in March 2024: 55.4kg on 3/1/24.  Suspect current weight is up from fluid retention/edema as it is unlikely that she gained >15kg of fat/muscle in about one month.    Weight Change %:     Nutrition Focused Physical Exam Findings:    Subcutaneous Fat Loss:   Orbital Fat Pads: Mild-Moderate (slight dark circles and slight hollowing)  Buccal Fat Pads: Mild-Moderate (flat cheeks, minimal bounce)  Triceps: Severe (negligible fat tissue)  Muscle Wasting:  Temporalis: Mild-Moderate (slight depression)  Pectoralis (Clavicular Region): Severe (protruding prominent clavicle)  Deltoid/Trapezius: Severe (squared shoulders, acromion process prominent)  Quadriceps: Mild-Moderate (mild depression on inner and outer thigh)  Gastrocnemius: Mild-Moderate (not well developed muscle)  Edema:  Edema: +2 mild  Edema Location: generalized  Physical Findings:  Skin: Positive (wound to groin)    Nutrition Significant Labs:  BMP Trend:   Results from last 7 days   Lab Units 04/10/24  0511 04/09/24  1105   GLUCOSE mg/dL 238* 39*   CALCIUM mg/dL 7.3* 7.8* " "  SODIUM mmol/L 125* 127*   POTASSIUM mmol/L 5.0 4.6   CO2 mmol/L 11* 14*   CHLORIDE mmol/L 99 101   BUN mg/dL 17 18   CREATININE mg/dL 4.61* 4.80*    , A1C:No results found for: \"HGBA1C\", BG POCT trend:   Results from last 7 days   Lab Units 04/10/24  0733 04/10/24  0409 04/10/24  0015 04/09/24 2007 04/09/24  1707   POCT GLUCOSE mg/dL 269* 253* 261* 195* 193*    , Liver Function Trend:   Results from last 7 days   Lab Units 04/10/24  0511 04/09/24  1105   ALK PHOS U/L 124* 130*   AST U/L 57* 48*   ALT U/L 19 16   BILIRUBIN TOTAL mg/dL 2.2* 1.4*    , Renal Lab Trend:   Results from last 7 days   Lab Units 04/10/24  0511 04/09/24  1105   POTASSIUM mmol/L 5.0 4.6   PHOSPHORUS mg/dL 7.2* 6.8*   SODIUM mmol/L 125* 127*   MAGNESIUM mg/dL 1.85 2.01   EGFR mL/min/1.73m*2 11* 11*   BUN mg/dL 17 18   CREATININE mg/dL 4.61* 4.80*    , Vit D: No results found for: \"VITD25\"     Nutrition Specific Medications:  Scheduled medications  azithromycin, 500 mg, oral, q24h TEZ  folic acid, 1 mg, oral, Daily  hydrocortisone sodium succinate, 50 mg, intravenous, q6h  insulin lispro, 0-10 Units, subcutaneous, TID with meals  lactulose, 20 g, oral, q2h  pantoprazole, 40 mg, intravenous, BID  phytonadione, 10 mg, intravenous, Once  piperacillin-tazobactam, 2.25 g, intravenous, q6h  rifAXIMin, 550 mg, oral, q12h TEZ  thiamine, 500 mg, intravenous, q8h TEZ      Continuous medications  fentaNYL,  mcg/hr, Last Rate: 75 mcg/hr (04/10/24 0940)  norepinephrine, 0-3 mcg/kg/min, Last Rate: 0.2 mcg/kg/min (04/10/24 0948)  octreotide, 50 mcg/hr, Last Rate: 50 mcg/hr (04/10/24 0600)  oxygen,   propofol, 5-50 mcg/kg/min, Last Rate: 25 mcg/kg/min (04/10/24 0800)  sodium bicarbonate, 125 mL/hr, Last Rate: 125 mL/hr (04/10/24 0930)  vasopressin, 0.03 Units/min, Last Rate: 0.03 Units/min (04/10/24 0800)      PRN medications  PRN medications: dextrose, dextrose, fentaNYL, glucagon, glucagon, ketamine, oxygen, rocuronium, vancomycin     I/O:    ;  "       Dietary Orders (From admission, onward)       Start     Ordered    04/09/24 1707  NPO Diet; Effective now  Diet effective now         04/09/24 1709                     Estimated Needs:   Total Energy Estimated Needs (kCal):  (3985-0638)  Method for Estimating Needs: MV= 1284 using 55.4kg  Total Protein Estimated Needs (g): 85 g  Method for Estimating Needs: 1.5 x 55.4kg            Nutrition Diagnosis   Malnutrition Diagnosis  Patient has Malnutrition Diagnosis: Yes  Diagnosis Status: New  Malnutrition Diagnosis: Moderate malnutrition related to chronic disease or condition  As Evidenced by: noted moderate to severe fat and muscle wasting on physical exam; suspect pt was not eating adequately PTA in light of EtOH abuse based on admit records from [facility]        Nutrition Interventions/Recommendations         Nutrition Prescription:  For tube feed: Two Ankur HN at start rate of 15mls/hr.  While on current propofol rate, increase once after 8hrs to goal rate of 25mls/hr reached.    Once off of Propofol, please order two packets of Pro-Stat AWC to be given daily.  Water flushes per team-- goal rate provides 420mls free water daily.  Check Vitamin D level.          Nutrition Interventions:   Food and/or Nutrient Delivery Interventions  Interventions: Enteral intake  Goal:   TF at goal with propofol= 1517kcals, 50grams protein  TF at goal without propofol but with prostat= 1400kcals, 84grams protein         Nutrition Education:   Not appropriate       Nutrition Monitoring and Evaluation   Food/Nutrient Related History Monitoring  Monitoring and Evaluation Plan: Enteral and parenteral nutrition intake  Criteria: TF at goal with propofol= TF at goal without propofol=         Time Spent/Follow-up Reminder:   Time Spent (min): 60 minutes  Last Date of Nutrition Visit: 04/10/24  Nutrition Follow-Up Needed?: Dietitian to reassess per policy  Follow up Comment: TF recs, OGT, vent

## 2024-04-10 NOTE — CONSULTS
Reason For Consult  Possible HRS and need for HD    History Of Present Illness  Vera Beard is a 43 y.o. female w/ a PMH of Alcohol-induced Liver Cirrhosis, Chronic pancreatitis, Hx of UGIB (no banding required), Hepatic Encephalopathy (on home lactulose, some non-compliance) that presented to the ED with Lethargy and AMS. She was noted to have had some hematemesis, had increasing O2 requirements and was later in shock, requiring MICU transfer for pressor support (Vaso, Levo). CT Imaging in the ED showed findings of multifocal PNA and an emergent paracentesis done had +SAAG findings for SBP. Consideration for septic vs hemorrhagic (Hgb 6.3) vs hypovolemic etiology and is currently intubated for AMS and airway protection; NG tube placed.     Of note, she was [facility] -[facility] for hemorrhagic vs septic shock, which required some stay in the MICU. On this admission, she had a cardiac arrest event where she required 2 cycles of CRP before ROSC was achieved. Her infectious w/up then was negative but was treated w/ Zosyn for 5 days. She left [place] on 3/9 and had an episode of hematemesis 2 days prior to this.     ED course:   -VS: BP 77/54, , O2 94%, RR 18  -Labs:  INR 1.7, PT 19.7, aPTT 43, Lipase WNL, UA: 2+ protein, trace blood, 75 leuk esterase, U tox: positive for cannabinoids and cocaine, CBC: 10.7/6.3/12.7/74, VB.30 / 26 lactate 1.8, Ammonia 115 RFP: 127/4.6  101/14  18/4.80 glucose 39; Mg 2.01, Phos 6.8. AST 48, ALT 16, , tB 1.4. TSH 6.31, t4 1.31, t3 0.3   -Imaging:   CTH negative.   CT CAP:   Extensive consolidation/ground-glass opacities throughout both lungs consistent with multifocal pneumonia. Small bilateral pleural effusions. Small pericardial effusion. Large volume ascites. Severe diffuse hepatic steatosis. Cholelithiasis. Severe body wall anasarca.    -Interventions; Vanc + zosyn + azithromycin (-*), 1000 mL LR, 75g 25% albumin. Persistently hypotensive-->  Levophed, vasopressin, stress dose steroids. synthroid 200mcg IV (No Hx hypothyroid, TSH 6.31, t4 1.31, t3 0.3), protonix 80mg IV, thiamine 500mg IV also administered. Paracentesis done in ED, traumatic though with correct--> + for SBP    Past Medical History  As stated in HPI    Surgical History  None reported     Social History  EtOH - Has hx of use   Smoking - Smokes 0.5ppd  Drugs - Cocaine, Marijuana    Family History  No family history on file.     Allergies  Patient has no known allergies.    Review of Systems  As stated in HPI     Objective Data:  Vitals: I/O:   Vitals:    04/10/24 0900   BP:    Pulse: 75   Resp: 19   Temp:    SpO2: 99%        24hr Min/Max:  Temp  Min: 36 °C (96.8 °F)  Max: 36.5 °C (97.7 °F)  Pulse  Min: 75  Max: 116  BP  Min: 58/48  Max: 120/67  Resp  Min: 5  Max: 55  SpO2  Min: 87 %  Max: 100 %   Intake/Output Summary (Last 24 hours) at 4/10/2024 0936  Last data filed at 4/10/2024 0900  Gross per 24 hour   Intake 3063.61 ml   Output 270 ml   Net 2793.61 ml        Net IO Since Admission: 2,793.61 mL [04/10/24 0936]      Physical Exam  General: Intubated, sedated; does not withdraw to pain   HEENT:  L conjunctival hemorrhage, Broken teeth, blood   Skin: No suspect lesions or rashes noted on visible skin  Chest: On mechanical ventilation  Cardiac: Regular rate and rhythm, normal s1, s2, no M/R/G, no JVD  Abdomen: Soft, ND, NT, no involuntary guarding  : No flank pain, indwelling urinary catheter in place  EXT: b/l LE +2 edema     Lab  CBC RFP   Lab Results   Component Value Date    WBC 15.0 (H) 04/10/2024    HGB 9.3 (L) 04/10/2024    HCT 25.4 (L) 04/10/2024    MCV 86 04/10/2024    PLT 90 (L) 04/10/2024    NEUTROABS 8.26 (H) 04/09/2024    Lab Results   Component Value Date     (L) 04/10/2024    K 5.0 04/10/2024    CL 99 04/10/2024    CO2 11 (L) 04/10/2024    BUN 17 04/10/2024    CREATININE 4.61 (H) 04/10/2024    CREATININE 4.80 (H) 04/09/2024     Lab Results   Component Value Date    MG  1.85 04/10/2024    PHOS 7.2 (H) 04/10/2024    CALCIUM 7.3 (L) 04/10/2024         Hepatic Function ABG/VBG   Lab Results   Component Value Date    ALT 19 04/10/2024    AST 57 (H) 04/10/2024    ALKPHOS 124 (H) 04/10/2024     Lab Results   Component Value Date    BILIDIR 0.8 (H) 04/10/2024      Lab Results   Component Value Date    PROTIME 22.5 (H) 04/10/2024    APTT 61 (H) 04/10/2024    INR 2.0 (H) 04/10/2024    Lab Results   Component Value Date    LACTATE 2.0 04/09/2024        Results from last 7 days   Lab Units 04/10/24  0511 04/09/24  2358 04/09/24  1730 04/09/24  1105   WBC AUTO x10*3/uL 15.0* 13.0* 17.5* 10.7   HEMOGLOBIN g/dL 9.3* 8.4* 6.8* 6.3*   HEMATOCRIT % 25.4* 23.2* 19.1* 17.2*   PLATELETS AUTO x10*3/uL 90* 90* 89* 74*   NEUTROS PCT AUTO %  --   --   --  77.0   LYMPHO PCT MAN % 4.2 1.6  --   --    LYMPHS PCT AUTO %  --   --   --  12.8   MONO PCT MAN % 4.2 6.6  --   --    MONOS PCT AUTO %  --   --   --  9.1   EOSINO PCT MAN % 0.0 0.0  --   --    EOS PCT AUTO %  --   --   --  0.3     Results from last 7 days   Lab Units 04/10/24  0511 04/09/24  1105   SODIUM mmol/L 125* 127*   POTASSIUM mmol/L 5.0 4.6   CHLORIDE mmol/L 99 101   CO2 mmol/L 11* 14*   BUN mg/dL 17 18   CREATININE mg/dL 4.61* 4.80*   CALCIUM mg/dL 7.3* 7.8*   PROTEIN TOTAL g/dL 5.8* 5.8*   BILIRUBIN TOTAL mg/dL 2.2* 1.4*   ALK PHOS U/L 124* 130*   ALT U/L 19 16   AST U/L 57* 48*   GLUCOSE mg/dL 238* 39*     Results from last 7 days   Lab Units 04/10/24  0511 04/09/24  1105   MAGNESIUM mg/dL 1.85 2.01       Results from last 7 days   Lab Units 04/10/24  0733 04/10/24  0511 04/10/24  0409 04/10/24  0015 04/09/24  2007 04/09/24  1707 04/09/24  1115 04/09/24  1105   POCT GLUCOSE mg/dL 269*  --  253* 261* 195* 193*   < >  --    GLUCOSE mg/dL  --  238*  --   --   --   --   --  39*    < > = values in this interval not displayed.     TSH   Result Value Ref Range    Thyroid Stimulating Hormone 6.31 (H) 0.44 - 3.98 mIU/L   T4, free   Result Value Ref  Range    Thyroxine, Free 1.31 0.78 - 1.48 ng/dL   T3, free   Result Value Ref Range    Triiodothyronine, Free 0.3 (L) 2.3 - 4.2 pg/mL      Specimen: Tracheal Aspirate; Fluid   Result Value Ref Range    Respiratory Culture/Smear (4+) Abundant Pseudomonas aeruginosa (A)     Gram Stain (1+) Rare Polymorphonuclear leukocytes (A)     Gram Stain (3+) Moderate Gram negative bacilli (A)     Gram Stain (1+) Rare Gram positive cocci (A)      Thyroid Peroxidase Antibody   Result Value Ref Range    Thyroid Peroxidase (TPO) Antibody <28 <=60 IU/mL   Urine electrolytes   Result Value Ref Range    Sodium, Urine Random 18 mmol/L    Sodium/Creatinine Ratio 10 Not established. mmol/g Creat    Potassium, Urine Random 65 mmol/L    Potassium/Creatinine Ratio 36 Not established mmol/g Creat    Chloride, Urine Random 21 mmol/L    Chloride/Creatinine Ratio 12 (L) 38 - 318 mmol/g creat    Creatinine, Urine Random 178.7 20.0 - 320.0 mg/dL   Urea Nitrogen, Urine Random   Result Value Ref Range    Urea Nitrogen, Urine Random 86 mg/dL    Creatinine, Urine Random 178.7 20.0 - 320.0 mg/dL    Urea Nitrogen/Creatinine Ratio 0.5 Not established. g/g creat   Procalcitonin   Result Value Ref Range    Procalcitonin 1.36 (H) <=0.07 ng/mL     Blood Gas Arterial Full Panel   Result Value Ref Range    POCT pH, Arterial 7.20 (LL) 7.38 - 7.42 pH    POCT pCO2, Arterial 33 (L) 38 - 42 mm Hg    POCT pO2, Arterial 128 (H) 85 - 95 mm Hg    POCT SO2, Arterial 100 94 - 100 %    POCT Oxy Hemoglobin, Arterial 98.8 (H) 94.0 - 98.0 %    POCT Hematocrit Calculated, Arterial 23.0 (L) 36.0 - 46.0 %    POCT Sodium, Arterial 124 (L) 136 - 145 mmol/L    POCT Potassium, Arterial 4.2 3.5 - 5.3 mmol/L    POCT Chloride, Arterial 97 (L) 98 - 107 mmol/L    POCT Ionized Calcium, Arterial 1.09 (L) 1.10 - 1.33 mmol/L    POCT Glucose, Arterial 296 (H) 74 - 99 mg/dL    POCT Lactate, Arterial 4.4 (HH) 0.4 - 2.0 mmol/L    POCT Base Excess, Arterial -14.0 (L) -2.0 - 3.0 mmol/L    POCT  HCO3 Calculated, Arterial 12.9 (L) 22.0 - 26.0 mmol/L    POCT Hemoglobin, Arterial 7.7 (L) 12.0 - 16.0 g/dL    POCT Anion Gap, Arterial 18 10 - 25 mmo/L    Patient Temperature 37.0 degrees Celsius    FiO2 100 %   POCT GLUCOSE   Result Value Ref Range    POCT Glucose 333 (H) 74 - 99 mg/dL     Imaging  XR chest 1 view  Result Date: 4/10/2024  1. Multifocal consolidative opacities with increased interstitial markings. Findings may be due to severe edema or multifocal infection. Aeration of the right upper lung is improved compared to prior study.   2. Small left pleural effusion.   3. Medical devices as described           US liver with doppler  Result Date: 4/10/2024  1. Hepatic cirrhosis with large volume ascites. Limited evaluation of the portal venous system. 2. Cholelithiasis without acute cholecystitis.      CT chest abdomen pelvis wo IV contrast  Result Date: 4/9/2024  Chest   1. Endotracheal tube in place with the tip terminating at the level of the faby/right mainstem bronchus. Recommend retracting tube.   2. Extensive consolidation/ground-glass opacities throughout both lungs consistent with multifocal pneumonia.   3. Small bilateral pleural effusions.   4. Small pericardial effusion.      Abdomen-Pelvis   1. Large volume ascites.   2. Severe diffuse hepatic steatosis.   3. Cholelithiasis.   4. Severe body wall anasarca.       Kidneys Ureter:   Bilateral kidneys are mildly atrophic.   Punctate 0.3 cm calculus at the right upper pole.   No hydronephrosis.      Medications  Scheduled Medications:  PRN Medications:    [START ON 4/12/2024] albumin human, 1 g/kg, intravenous, Once  azithromycin, 500 mg, oral, q24h TEZ  calcium gluconate, 2 g, intravenous, Once  folic acid, 1 mg, oral, Daily  hydrocortisone sodium succinate, 50 mg, intravenous, q6h  insulin lispro, 0-10 Units, subcutaneous, TID with meals  lactulose, 20 g, oral, q2h  oxygen, , inhalation, Continuous - Inhalation  pantoprazole, 40 mg, intravenous,  BID  phytonadione, 10 mg, intravenous, Once  piperacillin-tazobactam, 2.25 g, intravenous, q6h  rifAXIMin, 550 mg, oral, q12h TEZ  thiamine, 500 mg, intravenous, q8h TEZ     PRN medications: dextrose, dextrose, fentaNYL, glucagon, glucagon, ketamine, rocuronium, vancomycin       Assessment:  Ms. Vera Beard is a 43 y.o. female w/ a PMH of Alcohol-induced Liver Cirrhosis, Chronic pancreatitis, Hx of UGIB (no banding done), Hepatic Encephalopathy (on home lactulose, some non-compliance) that that presented to the ED for Lethargy and AMS, and then seen to be in shock, likely multifactorial from sepsis (MF PNA and SBP), hemorrhagic (Hgb 6.3, hemoptysis) and now intubated in the MICU requiring pressor support.     #KEITH, Oliguric, Stage 3  ::Bll Cr 0.7-0.8  ::Current Cr 4.6, UOP 270cc  Etiology:  Likely multifactorial iso of septic/hemorrhagic shock, Utox+ for cocaine; can compromise renal perfusion. She has significant 3rd spacing which may also affect preload and global perfusion as well. Contrast use, Vanc/Zosyn; are nephrotoxic and can cause direct renal injury.      #HRS consideration  :: Current KEITH has multiple likely causes other than HRS    #Acidosis, metabolic acidosis (AGMA, NAGMA)  ::ABG 7.20, Bicarb 11, AG 15, d-d gap 5, PCO2 26 (appropriately compensated)  ::Glucose on intake was 39, LA 4, K+ is wnl  ::NAGMA likely related to GI losses from Lactulose    #Acute moderate Hyponatremia, hypervolemic  :: Na 125 4/10  :: Likely etiology 2/2 Liver cirrhosis  - Will need diuresis     Recommendations:  Pt is HD unstable and will require supportive management for now  - Avoid Nephrotoxins, contrast if possible  - Strict Is/Os  - Renal dosing for medications for latest eGFR, follow medication trough as appropriate  - Avoid hypotension/rapid fluctuations in BPs; Keep SBP >100, MAPs >65  - Will CTM Na levels for now     - HD using CVVH will be started to aid with UF and fluid removal     - Nephrology will continue to  follow and manage HD     Assessment and Plan discussed with Attending Nephrologist, Dr. Garcia.    Jose Antonio Lal MD  Prelim PGY-1, Internal Medicine  Nephrology Pager # [phone]

## 2024-04-10 NOTE — PROCEDURES
A time-out was performed. The patient's Left neck region was prepped and draped in sterile fashion using chlorhexidine scrub. Anesthesia was achieved with 1% lidocaine. The Left internal jugular vein was accessed under ultrasound guidance using a micropuncture finder needle and sheath. Venous blood was withdrawn and the sheath was advanced into the vein and the needle was withdrawn. A guidewire was advanced through the sheath. A small incision was made with a 10 blade scalpel and the sheath was exchanged for a dilator over the guidewire until appropriate dilation was obtained. The dilator was removed and a Trialysis catheter was advanced over the guidewire and secured into place with 2 sutures.

## 2024-04-10 NOTE — PROGRESS NOTES
"Vera Beard is a 43 y.o. female on day 1 of admission presenting with Hypoglycemia.    Subjective   Overnight: NAEO. Started on octreotide for HRS. Was not given additional bicarb. Intubated and sedated. On 100% FiO2.        Objective     Physical Exam  Constitutional:       Comments: Intubated, sedated; not responding/following   HENT:      Mouth/Throat:      Mouth: Mucous membranes are moist.      Pharynx: Oropharynx is clear.   Eyes:      Pupils: Pupils are equal, round, and reactive to light.      Comments: L conjunctival hemorrhage   Cardiovascular:      Rate and Rhythm: Normal rate and regular rhythm.      Pulses: Normal pulses.      Heart sounds: No murmur heard.  Pulmonary:      Comments: Coarse bilateral rhonchi, R>L  Abdominal:      Comments: Distended, firm   Musculoskeletal:      Right lower leg: Edema present.      Left lower leg: Edema present.   Skin:     General: Skin is dry.      Capillary Refill: Capillary refill takes 2 to 3 seconds.   Neurological:      Comments: Sedated, not responding/following         Last Recorded Vitals  Blood pressure 100/60, pulse 72, temperature 35.9 °C (96.6 °F), temperature source Temporal, resp. rate 18, height 1.651 m (5' 5\"), weight 71.9 kg (158 lb 8.2 oz), SpO2 96%.  Intake/Output last 3 Shifts:  I/O last 3 completed shifts:  In: 2849.7 (39.6 mL/kg) [I.V.:589.7 (8.2 mL/kg); Blood:350; NG/GT:60; IV Piggyback:1850]  Out: 70 (1 mL/kg) [Urine:70 (0 mL/kg/hr)]  Weight: 71.9 kg     Relevant Results  Scheduled medications  atropine, , ,   azithromycin, 500 mg, oral, q24h TEZ  folic acid, 1 mg, oral, Daily  hydrocortisone sodium succinate, 50 mg, intravenous, q6h  insulin lispro, 0-10 Units, subcutaneous, TID with meals  lactulose, 20 g, oral, q2h  [START ON 4/11/2024] levothyroxine, 88 mcg, oral, Daily  [START ON 4/11/2024] meropenem, 2 g, intravenous, q12h  meropenem, 2,000 mg, intravenous, Once  [START ON 4/11/2024] pantoprazole, 40 mg, intravenous, Daily  phenylephrine " HCl in 0.9% NaCl, , ,   rifAXIMin, 550 mg, oral, q12h TEZ  rocuronium, , ,   rocuronium, 80 mg, intravenous, Once  thiamine, 500 mg, intravenous, q8h TEZ      Continuous medications  epoprostenol, 0.01 mcg/kg/min (Ideal), Last Rate: 0.01 mcg/kg/min (04/10/24 1347)  fentaNYL,  mcg/hr, Last Rate: 75 mcg/hr (04/10/24 1200)  norepinephrine, 0-3 mcg/kg/min, Last Rate: 0.25 mcg/kg/min (04/10/24 1400)  octreotide, 50 mcg/hr, Last Rate: 50 mcg/hr (04/10/24 1200)  oxygen,   PrismaSol 4/2.5, 2,100 mL/hr  propofol, 5-50 mcg/kg/min, Last Rate: 25 mcg/kg/min (04/10/24 1400)  sodium bicarbonate, 125 mL/hr, Last Rate: 125 mL/hr (04/10/24 1445)  vasopressin, 0.03 Units/min, Last Rate: 0.03 Units/min (04/10/24 1400)      PRN medications  PRN medications: atropine, dextrose, dextrose, fentaNYL, glucagon, glucagon, ketamine, oxygen, phenylephrine HCl in 0.9% NaCl, rocuronium, rocuronium, vancomycin  Results for orders placed or performed during the hospital encounter of 04/09/24 (from the past 24 hour(s))   POCT GLUCOSE   Result Value Ref Range    POCT Glucose 193 (H) 74 - 99 mg/dL   Respiratory Culture/Smear    Specimen: Tracheal Aspirate; Fluid   Result Value Ref Range    Respiratory Culture/Smear (4+) Abundant Pseudomonas aeruginosa (A)     Gram Stain (1+) Rare Polymorphonuclear leukocytes (A)     Gram Stain (3+) Moderate Gram negative bacilli (A)     Gram Stain (1+) Rare Gram positive cocci (A)    MRSA Surveillance for Vancomycin De-escalation, PCR    Specimen: Anterior Nares; Swab   Result Value Ref Range    MRSA PCR Detected (A) Not Detected   Legionella Antigen, Urine    Specimen: Urine   Result Value Ref Range    L. pneumophila Urine Ag Negative Negative   Streptococcus pneumoniae Antigen, Urine    Specimen: Urine   Result Value Ref Range    Streptococcus pneumoniae Ag, Urine Negative Negative   Blood Gas Arterial Full Panel   Result Value Ref Range    POCT pH, Arterial 7.26 (L) 7.38 - 7.42 pH    POCT pCO2, Arterial 27  (L) 38 - 42 mm Hg    POCT pO2, Arterial 73 (L) 85 - 95 mm Hg    POCT SO2, Arterial 95 94 - 100 %    POCT Oxy Hemoglobin, Arterial 93.3 (L) 94.0 - 98.0 %    POCT Hematocrit Calculated, Arterial 21.0 (L) 36.0 - 46.0 %    POCT Sodium, Arterial 125 (L) 136 - 145 mmol/L    POCT Potassium, Arterial 4.4 3.5 - 5.3 mmol/L    POCT Chloride, Arterial 102 98 - 107 mmol/L    POCT Ionized Calcium, Arterial 1.14 1.10 - 1.33 mmol/L    POCT Glucose, Arterial 196 (H) 74 - 99 mg/dL    POCT Lactate, Arterial 2.2 (H) 0.4 - 2.0 mmol/L    POCT Base Excess, Arterial -13.7 (L) -2.0 - 3.0 mmol/L    POCT HCO3 Calculated, Arterial 12.1 (L) 22.0 - 26.0 mmol/L    POCT Hemoglobin, Arterial 7.1 (L) 12.0 - 16.0 g/dL    POCT Anion Gap, Arterial 15 10 - 25 mmo/L    Patient Temperature 37.0 degrees Celsius    FiO2 60 %   CBC   Result Value Ref Range    WBC 17.5 (H) 4.4 - 11.3 x10*3/uL    nRBC 0.0 0.0 - 0.0 /100 WBCs    RBC 2.12 (L) 4.00 - 5.20 x10*6/uL    Hemoglobin 6.8 (L) 12.0 - 16.0 g/dL    Hematocrit 19.1 (L) 36.0 - 46.0 %    MCV 90 80 - 100 fL    MCH 32.1 26.0 - 34.0 pg    MCHC 35.6 32.0 - 36.0 g/dL    RDW 18.7 (H) 11.5 - 14.5 %    Platelets 89 (L) 150 - 450 x10*3/uL   Thyroid Peroxidase Antibody   Result Value Ref Range    Thyroid Peroxidase (TPO) Antibody <28 <=60 IU/mL   Urine electrolytes   Result Value Ref Range    Sodium, Urine Random 18 mmol/L    Sodium/Creatinine Ratio 10 Not established. mmol/g Creat    Potassium, Urine Random 65 mmol/L    Potassium/Creatinine Ratio 36 Not established mmol/g Creat    Chloride, Urine Random 21 mmol/L    Chloride/Creatinine Ratio 12 (L) 38 - 318 mmol/g creat    Creatinine, Urine Random 178.7 20.0 - 320.0 mg/dL   Urea Nitrogen, Urine Random   Result Value Ref Range    Urea Nitrogen, Urine Random 86 mg/dL    Creatinine, Urine Random 178.7 20.0 - 320.0 mg/dL    Urea Nitrogen/Creatinine Ratio 0.5 Not established. g/g creat   Procalcitonin   Result Value Ref Range    Procalcitonin 1.36 (H) <=0.07 ng/mL   ECG 12  lead   Result Value Ref Range    Ventricular Rate 104 BPM    Atrial Rate 104 BPM    WV Interval 130 ms    QRS Duration 82 ms    QT Interval 314 ms    QTC Calculation(Bazett) 412 ms    P Axis 66 degrees    R Axis -17 degrees    T Axis 203 degrees    QRS Count 17 beats    Q Onset 224 ms    P Onset 159 ms    P Offset 209 ms    T Offset 381 ms    QTC Fredericia 377 ms   Vitamin B12   Result Value Ref Range    Vitamin B12 1,370 (H) 211 - 911 pg/mL   Folate   Result Value Ref Range    Folate, Serum 15.7 >5.0 ng/mL   POCT GLUCOSE   Result Value Ref Range    POCT Glucose 195 (H) 74 - 99 mg/dL   CBC and Auto Differential   Result Value Ref Range    WBC 13.0 (H) 4.4 - 11.3 x10*3/uL    nRBC 0.0 0.0 - 0.0 /100 WBCs    RBC 2.67 (L) 4.00 - 5.20 x10*6/uL    Hemoglobin 8.4 (L) 12.0 - 16.0 g/dL    Hematocrit 23.2 (L) 36.0 - 46.0 %    MCV 87 80 - 100 fL    MCH 31.5 26.0 - 34.0 pg    MCHC 36.2 (H) 32.0 - 36.0 g/dL    RDW 18.8 (H) 11.5 - 14.5 %    Platelets 90 (L) 150 - 450 x10*3/uL    Immature Granulocytes %, Automated 1.3 (H) 0.0 - 0.9 %    Immature Granulocytes Absolute, Automated 0.17 0.00 - 0.70 x10*3/uL   Manual Differential   Result Value Ref Range    Neutrophils %, Manual 91.8 40.0 - 80.0 %    Lymphocytes %, Manual 1.6 13.0 - 44.0 %    Monocytes %, Manual 6.6 2.0 - 10.0 %    Eosinophils %, Manual 0.0 0.0 - 6.0 %    Basophils %, Manual 0.0 0.0 - 2.0 %    Seg Neutrophils Absolute, Manual 11.93 (H) 1.20 - 7.00 x10*3/uL    Lymphocytes Absolute, Manual 0.21 (L) 1.20 - 4.80 x10*3/uL    Monocytes Absolute, Manual 0.86 0.10 - 1.00 x10*3/uL    Eosinophils Absolute, Manual 0.00 0.00 - 0.70 x10*3/uL    Basophils Absolute, Manual 0.00 0.00 - 0.10 x10*3/uL    Total Cells Counted 122     RBC Morphology See Below     Hypochromia Mild     [place] Cells Many    POCT GLUCOSE   Result Value Ref Range    POCT Glucose 261 (H) 74 - 99 mg/dL   Blood Gas Arterial Full Panel   Result Value Ref Range    POCT pH, Arterial 7.23 (LL) 7.38 - 7.42 pH    POCT  pCO2, Arterial 26 (L) 38 - 42 mm Hg    POCT pO2, Arterial 76 (L) 85 - 95 mm Hg    POCT SO2, Arterial 96 94 - 100 %    POCT Oxy Hemoglobin, Arterial 94.4 94.0 - 98.0 %    POCT Hematocrit Calculated, Arterial 28.0 (L) 36.0 - 46.0 %    POCT Sodium, Arterial 124 (L) 136 - 145 mmol/L    POCT Potassium, Arterial 5.2 3.5 - 5.3 mmol/L    POCT Chloride, Arterial 99 98 - 107 mmol/L    POCT Ionized Calcium, Arterial 1.03 (L) 1.10 - 1.33 mmol/L    POCT Glucose, Arterial 239 (H) 74 - 99 mg/dL    POCT Lactate, Arterial 3.5 (H) 0.4 - 2.0 mmol/L    POCT Base Excess, Arterial -15.2 (L) -2.0 - 3.0 mmol/L    POCT HCO3 Calculated, Arterial 10.9 (L) 22.0 - 26.0 mmol/L    POCT Hemoglobin, Arterial 9.2 (L) 12.0 - 16.0 g/dL    POCT Anion Gap, Arterial 19 10 - 25 mmo/L    Patient Temperature 37.0 degrees Celsius    FiO2 100 %   POCT GLUCOSE   Result Value Ref Range    POCT Glucose 253 (H) 74 - 99 mg/dL   CBC and Auto Differential   Result Value Ref Range    WBC 15.0 (H) 4.4 - 11.3 x10*3/uL    nRBC 0.0 0.0 - 0.0 /100 WBCs    RBC 2.96 (L) 4.00 - 5.20 x10*6/uL    Hemoglobin 9.3 (L) 12.0 - 16.0 g/dL    Hematocrit 25.4 (L) 36.0 - 46.0 %    MCV 86 80 - 100 fL    MCH 31.4 26.0 - 34.0 pg    MCHC 36.6 (H) 32.0 - 36.0 g/dL    RDW 19.5 (H) 11.5 - 14.5 %    Platelets 90 (L) 150 - 450 x10*3/uL    Immature Granulocytes %, Automated 0.9 0.0 - 0.9 %    Immature Granulocytes Absolute, Automated 0.13 0.00 - 0.70 x10*3/uL   Coagulation Screen   Result Value Ref Range    Protime 22.5 (H) 9.8 - 12.8 seconds    INR 2.0 (H) 0.9 - 1.1    aPTT 61 (H) 27 - 38 seconds   Hepatic Function Panel   Result Value Ref Range    Albumin 1.7 (L) 3.4 - 5.0 g/dL    Bilirubin, Total 2.2 (H) 0.0 - 1.2 mg/dL    Bilirubin, Direct 0.8 (H) 0.0 - 0.3 mg/dL    Alkaline Phosphatase 124 (H) 33 - 110 U/L    ALT 19 7 - 45 U/L    AST 57 (H) 9 - 39 U/L    Total Protein 5.8 (L) 6.4 - 8.2 g/dL   Magnesium   Result Value Ref Range    Magnesium 1.85 1.60 - 2.40 mg/dL   TSH   Result Value Ref  Range    Thyroid Stimulating Hormone 6.31 (H) 0.44 - 3.98 mIU/L   T4, free   Result Value Ref Range    Thyroxine, Free 1.31 0.78 - 1.48 ng/dL   T3, free   Result Value Ref Range    Triiodothyronine, Free 0.3 (L) 2.3 - 4.2 pg/mL   Phosphorus   Result Value Ref Range    Phosphorus 7.2 (H) 2.5 - 4.9 mg/dL   Basic Metabolic Panel   Result Value Ref Range    Glucose 238 (H) 74 - 99 mg/dL    Sodium 125 (L) 136 - 145 mmol/L    Potassium 5.0 3.5 - 5.3 mmol/L    Chloride 99 98 - 107 mmol/L    Bicarbonate 11 (L) 21 - 32 mmol/L    Anion Gap 20 10 - 20 mmol/L    Urea Nitrogen 17 6 - 23 mg/dL    Creatinine 4.61 (H) 0.50 - 1.05 mg/dL    eGFR 11 (L) >60 mL/min/1.73m*2    Calcium 7.3 (L) 8.6 - 10.6 mg/dL   Manual Differential   Result Value Ref Range    Neutrophils %, Manual 91.6 40.0 - 80.0 %    Lymphocytes %, Manual 4.2 13.0 - 44.0 %    Monocytes %, Manual 4.2 2.0 - 10.0 %    Eosinophils %, Manual 0.0 0.0 - 6.0 %    Basophils %, Manual 0.0 0.0 - 2.0 %    Seg Neutrophils Absolute, Manual 13.74 (H) 1.20 - 7.00 x10*3/uL    Lymphocytes Absolute, Manual 0.63 (L) 1.20 - 4.80 x10*3/uL    Monocytes Absolute, Manual 0.63 0.10 - 1.00 x10*3/uL    Eosinophils Absolute, Manual 0.00 0.00 - 0.70 x10*3/uL    Basophils Absolute, Manual 0.00 0.00 - 0.10 x10*3/uL    Total Cells Counted 119     RBC Morphology See Below     Hypochromia Mild     Flor Cells Many    POCT GLUCOSE   Result Value Ref Range    POCT Glucose 269 (H) 74 - 99 mg/dL   BLOOD GAS ARTERIAL FULL PANEL   Result Value Ref Range    POCT pH, Arterial 7.20 (LL) 7.38 - 7.42 pH    POCT pCO2, Arterial 26 (L) 38 - 42 mm Hg    POCT pO2, Arterial 90 85 - 95 mm Hg    POCT SO2, Arterial 97 94 - 100 %    POCT Oxy Hemoglobin, Arterial 96.0 94.0 - 98.0 %    POCT Hematocrit Calculated, Arterial 28.0 (L) 36.0 - 46.0 %    POCT Sodium, Arterial 123 (L) 136 - 145 mmol/L    POCT Potassium, Arterial 5.4 (H) 3.5 - 5.3 mmol/L    POCT Chloride, Arterial 99 98 - 107 mmol/L    POCT Ionized Calcium, Arterial  1.07 (L) 1.10 - 1.33 mmol/L    POCT Glucose, Arterial 250 (H) 74 - 99 mg/dL    POCT Lactate, Arterial 4.0 (HH) 0.4 - 2.0 mmol/L    POCT Base Excess, Arterial -16.4 (L) -2.0 - 3.0 mmol/L    POCT HCO3 Calculated, Arterial 10.2 (L) 22.0 - 26.0 mmol/L    POCT Hemoglobin, Arterial 9.2 (L) 12.0 - 16.0 g/dL    POCT Anion Gap, Arterial 19 10 - 25 mmo/L    Patient Temperature 37.0 degrees Celsius    FiO2 100 %   Blood Gas Arterial Full Panel   Result Value Ref Range    POCT pH, Arterial 7.24 (LL) 7.38 - 7.42 pH    POCT pCO2, Arterial 29 (L) 38 - 42 mm Hg    POCT pO2, Arterial 67 (L) 85 - 95 mm Hg    POCT SO2, Arterial 92 (L) 94 - 100 %    POCT Oxy Hemoglobin, Arterial 90.8 (L) 94.0 - 98.0 %    POCT Hematocrit Calculated, Arterial 26.0 (L) 36.0 - 46.0 %    POCT Sodium, Arterial 124 (L) 136 - 145 mmol/L    POCT Potassium, Arterial 4.5 3.5 - 5.3 mmol/L    POCT Chloride, Arterial 99 98 - 107 mmol/L    POCT Ionized Calcium, Arterial 1.06 (L) 1.10 - 1.33 mmol/L    POCT Glucose, Arterial 271 (H) 74 - 99 mg/dL    POCT Lactate, Arterial 4.4 (HH) 0.4 - 2.0 mmol/L    POCT Base Excess, Arterial -13.7 (L) -2.0 - 3.0 mmol/L    POCT HCO3 Calculated, Arterial 12.4 (L) 22.0 - 26.0 mmol/L    POCT Hemoglobin, Arterial 8.8 (L) 12.0 - 16.0 g/dL    POCT Anion Gap, Arterial 17 10 - 25 mmo/L    Patient Temperature 37.0 degrees Celsius    FiO2 80 %   POCT GLUCOSE   Result Value Ref Range    POCT Glucose 293 (H) 74 - 99 mg/dL   POCT GLUCOSE   Result Value Ref Range    POCT Glucose 303 (H) 74 - 99 mg/dL   Vancomycin   Result Value Ref Range    Vancomycin 9.9 5.0 - 20.0 ug/mL   Blood Gas Arterial Full Panel   Result Value Ref Range    POCT pH, Arterial 7.21 (LL) 7.38 - 7.42 pH    POCT pCO2, Arterial 31 (L) 38 - 42 mm Hg    POCT pO2, Arterial 63 (L) 85 - 95 mm Hg    POCT SO2, Arterial 89 (L) 94 - 100 %    POCT Oxy Hemoglobin, Arterial 87.9 (L) 94.0 - 98.0 %    POCT Hematocrit Calculated, Arterial 22.0 (L) 36.0 - 46.0 %    POCT Sodium, Arterial 126 (L)  136 - 145 mmol/L    POCT Potassium, Arterial 4.3 3.5 - 5.3 mmol/L    POCT Chloride, Arterial 98 98 - 107 mmol/L    POCT Ionized Calcium, Arterial 1.17 1.10 - 1.33 mmol/L    POCT Glucose, Arterial 268 (H) 74 - 99 mg/dL    POCT Lactate, Arterial 4.2 (HH) 0.4 - 2.0 mmol/L    POCT Base Excess, Arterial -14.2 (L) -2.0 - 3.0 mmol/L    POCT HCO3 Calculated, Arterial 12.4 (L) 22.0 - 26.0 mmol/L    POCT Hemoglobin, Arterial 7.4 (L) 12.0 - 16.0 g/dL    POCT Anion Gap, Arterial 20 10 - 25 mmo/L    Patient Temperature 37.0 degrees Celsius    FiO2 100 %   Blood Gas Arterial Full Panel   Result Value Ref Range    POCT pH, Arterial 7.21 (LL) 7.38 - 7.42 pH    POCT pCO2, Arterial 32 (L) 38 - 42 mm Hg    POCT pO2, Arterial 54 (L) 85 - 95 mm Hg    POCT SO2, Arterial 81 (L) 94 - 100 %    POCT Oxy Hemoglobin, Arterial 80.1 (L) 94.0 - 98.0 %    POCT Hematocrit Calculated, Arterial 23.0 (L) 36.0 - 46.0 %    POCT Sodium, Arterial 124 (L) 136 - 145 mmol/L    POCT Potassium, Arterial 4.4 3.5 - 5.3 mmol/L    POCT Chloride, Arterial 98 98 - 107 mmol/L    POCT Ionized Calcium, Arterial 1.09 (L) 1.10 - 1.33 mmol/L    POCT Glucose, Arterial 289 (H) 74 - 99 mg/dL    POCT Lactate, Arterial 4.3 (HH) 0.4 - 2.0 mmol/L    POCT Base Excess, Arterial -13.9 (L) -2.0 - 3.0 mmol/L    POCT HCO3 Calculated, Arterial 12.8 (L) 22.0 - 26.0 mmol/L    POCT Hemoglobin, Arterial 7.6 (L) 12.0 - 16.0 g/dL    POCT Anion Gap, Arterial 18 10 - 25 mmo/L    Patient Temperature 37.0 degrees Celsius    FiO2 100 %   Blood Gas Arterial Full Panel   Result Value Ref Range    POCT pH, Arterial 7.20 (LL) 7.38 - 7.42 pH    POCT pCO2, Arterial 33 (L) 38 - 42 mm Hg    POCT pO2, Arterial 128 (H) 85 - 95 mm Hg    POCT SO2, Arterial 100 94 - 100 %    POCT Oxy Hemoglobin, Arterial 98.8 (H) 94.0 - 98.0 %    POCT Hematocrit Calculated, Arterial 23.0 (L) 36.0 - 46.0 %    POCT Sodium, Arterial 124 (L) 136 - 145 mmol/L    POCT Potassium, Arterial 4.2 3.5 - 5.3 mmol/L    POCT Chloride,  Arterial 97 (L) 98 - 107 mmol/L    POCT Ionized Calcium, Arterial 1.09 (L) 1.10 - 1.33 mmol/L    POCT Glucose, Arterial 296 (H) 74 - 99 mg/dL    POCT Lactate, Arterial 4.4 (HH) 0.4 - 2.0 mmol/L    POCT Base Excess, Arterial -14.0 (L) -2.0 - 3.0 mmol/L    POCT HCO3 Calculated, Arterial 12.9 (L) 22.0 - 26.0 mmol/L    POCT Hemoglobin, Arterial 7.7 (L) 12.0 - 16.0 g/dL    POCT Anion Gap, Arterial 18 10 - 25 mmo/L    Patient Temperature 37.0 degrees Celsius    FiO2 100 %     CT chest abdomen pelvis wo IV contrast    Result Date: 4/10/2024  Interpreted By:  Maxime Wilkes and Fu Tianyuan STUDY: CT CHEST ABDOMEN PELVIS WO CONTRAST;  4/9/2024 2:13 pm   INDICATION: Signs/Symptoms:Sepsis. Presenting with altered mental status.   COMPARISON: None.   ACCESSION NUMBER(S): [id number]   ORDERING CLINICIAN: BESS SEGOVIA   TECHNIQUE: CT of the chest, abdomen and pelvis was performed. Contiguous axial images were obtained at 3 mm slice thickness through the chest, abdomen and pelvis. Coronal and sagittal reconstructions at 3 mm slice thickness were performed. No intravenous contrast was administered.   FINDINGS: Please note that the study is limited without intravenous contrast.   CHEST:   LUNG/PLEURA/LARGE AIRWAYS: Endotracheal tube in place with the tip terminating at the level of the faby/right mainstem bronchus. Mild secretions in the distal trachea.   There are extensive confluent and patchy consolidations and ground-glass opacities throughout the bilateral lungs consistent with multifocal pneumonia. Small bilateral pleural effusions, right-greater-than-left. No pneumothorax.   VESSELS: Aorta and pulmonary arteries are normal caliber.  No significant atherosclerotic changes of the thoracic aorta. Mild coronary artery calcifications are present.   HEART: The heart is normal in size. Small pericardial effusion.   MEDIASTINUM AND CHARIS: No mediastinal, hilar, or axillary lymphadenopathy. The esophagus is within normal  limits.   CHEST WALL AND LOWER NECK: Severe diffuse chest wall edema is present. The visualized thyroid gland appears within normal limits.   ABDOMEN:   LIVER: Normal size and smooth contour. Severe diffuse hepatic steatosis. No focal parenchymal abnormalities are seen.   BILE DUCTS: The intrahepatic and extrahepatic bile ducts are nondilated.   GALLBLADDER: Cholelithiasis. The gallbladder is nondistended.   PANCREAS: The pancreas is unremarkable without evidence of ductal dilation or masses.   SPLEEN: The spleen is normal in size and without evidence of focal lesions.   ADRENAL GLANDS: Within normal limits.   KIDNEYS AND URETERS: Bilateral kidneys are mildly atrophic. Punctate 0.3 cm calculus at the right upper pole. No hydronephrosis.   PELVIS:   BLADDER: The bladder is decompressed with Tadeo catheter in place.   REPRODUCTIVE ORGANS: The uterus is present. No suspicious pelvic masses.   BOWEL: The stomach is unremarkable.  The small and large bowel are normal in caliber and demonstrate no wall thickening.  The appendix is not definitively visualized.   VESSELS: There is no aneurysmal dilatation of the abdominal aorta. The IVC is within normal limits. Mild atherosclerotic changes of the abdominal aorta and its branch vessels.   PERITONEUM/RETROPERITONEUM/LYMPH NODES: There is large volume ascites. No loculated fluid collections or intraperitoneal free air. No abdominopelvic lymphadenopathy is present.   ABDOMINAL WALL: Severe diffuse abdominal wall anasarca.   BONES: No acute osseous abnormality or suspicious osseous lesions.       Chest 1. Endotracheal tube in place with the tip terminating at the level of the origin of the right mainstem bronchus. 2. Extensive consolidation/ground-glass opacities throughout both lungs consistent with multifocal pneumonia. 3. Small bilateral pleural effusions. 4. Small pericardial effusion.   Abdomen-Pelvis 1. Large volume ascites. 2. Severe diffuse hepatic steatosis. 3.  Cholelithiasis. 4. Severe body wall anasarca.   I personally reviewed the images/study and I agree with the findings as stated by resident physician Rosales Vernon MD. This study was interpreted at [facility], [place], [place].   The above information was relayed to Ordering provider Perri Watts by Rosales Vernon MD via epic haiku at 2:30 p.m. on 04/09/2024 with readback verification.   MACRO: None   Signed by: Maxime Wilkes 4/10/2024 12:20 PM Dictation workstation:   [id number]    XR chest 1 view    Result Date: 4/10/2024  Interpreted By:  Clary Mcmillan, STUDY: XR CHEST 1 VIEW; 4/10/2024 7:49 am   INDICATION: Signs/Symptoms:dyspnea.   COMPARISON: Radiograph dated 04/09/2024   ACCESSION NUMBER(S): [id number]   ORDERING CLINICIAN: VIKAS BARRY   FINDINGS: ET tube is terminating 3.9 cm from the faby. Enteric tube is in place with the tip outside the field of view. Right IJ central venous catheter is projecting over right atrium.   The cardiac silhouette size is within normal limits.   Multifocal consolidation in bilateral lungs with slight improvement in the aeration of the right upper lung. Small left pleural effusion. No sizable pneumothorax.   No acute osseous change.       1. Multifocal consolidative opacities with increased interstitial markings. Findings may be due to severe edema or multifocal infection. Aeration of the right upper lung is improved compared to prior study. 2. Small left pleural effusion. 3. Medical devices as described       Signed by: Clary Green 4/10/2024 8:09 AM Dictation workstation:   [id number]    US liver with doppler    Result Date: 4/10/2024  Interpreted By:  Robert Lawler,  and Katherine Nelson STUDY: US LIVER WITH DOPPLER;  4/10/2024 4:12 am   INDICATION: Signs/Symptoms:SBP, alcohol cirrhosis decompensated; with dopplers.   COMPARISON: CT cap 04/09/2024   ACCESSION NUMBER(S): [id number]   ORDERING CLINICIAN: FERNANDO  WAYNEAND   TECHNIQUE: Multiple images of the right upper quadrant were obtained.  Gray scale, color Doppler and spectral Doppler waveform analysis was performed.   FINDINGS: Examination is significantly limited secondary to patient body habitus and extensive ascites. Within the limitation:   LIVER: The liver is small, heterogeneous, with nodular contour measuring 13.9 cm in the craniocaudal dimension.   GALLBLADDER: Cholelithiasis is seen without gallbladder dilatation, wall thickening, or pericholecystic fluid. Sonographic Jackson's sign is negative.   BILIARY SYSTEM: No significant intrahepatic biliary ductal dilatation. The CBD was not seen.   DOPPLER EVALUATION:   HEPATIC ARTERIES: Poorly visualized.   PORTAL VEIN: Limited evaluation of the portal vein system and branches. The main portal vein is patent measures 0.8 cm with peak velocity of 19 cm/s.   HEPATIC VEIN: The right, middle and left hepatic veins are patent and demonstrate triphasic antegrade flow. IVC appears also patent.   PANCREAS: The pancreas is poorly visualized due to overlying bowel gas.   RIGHT KIDNEY: The right kidney measures 7.9 cm in length. No overt hydronephrosis or renal calculi are seen.   SPLEEN: Poorly visualized.   PERITONEUM AND RETROPERITONEUM: Large volume ascites is seen.       Significantly limited examination.   1. Hepatic cirrhosis with large volume ascites. Limited evaluation of the portal venous system. 2. Cholelithiasis without acute cholecystitis.     I personally reviewed the images/study and I agree with Dr. Omar Murphy and the findings as stated.   MACRO: None   Signed by: Robert Lawler 4/10/2024 5:59 AM Dictation workstation:   [id number]    ECG 12 lead    Result Date: 4/10/2024  Sinus tachycardia with Premature atrial complexes with Aberrant conduction Low voltage QRS Nonspecific T wave abnormality Abnormal ECG No previous ECGs available See ED provider note for full interpretation and clinical correlation  Confirmed by Cesar Bentley ([id number]) on 4/10/2024 2:36:33 AM    XR abdomen 1 view    Result Date: 4/9/2024  Interpreted By:  Omer Tsai, STUDY: XR ABDOMEN 1 VIEW;  4/9/2024 6:19 pm   INDICATION: Signs/Symptoms:OG placement.   COMPARISON: CT chest abdomen pelvis 04/09/2024.   ACCESSION NUMBER(S): [id number]   ORDERING CLINICIAN: JAIME HE   FINDINGS: Enteric tube courses midline below the level of the diaphragm with the tip projecting over the expected location of the gastric antrum.   Nonobstructive bowel gas pattern. There is centralization of visualized loops of large bowel consistent with large volume ascites and diffuse anasarca on CT from 04/09/2024. Limited evaluation of pneumoperitoneum on supine imaging, however no gross evidence of free air is noted.   Unchanged patchy alveolar opacities throughout the bilateral lower lungs compared to prior examination with small left pleural effusion.   Osseous structures demonstrate no acute bony changes.       1.  Enteric tube with the distal tip projecting over the expected location of the gastric antrum. 2. Nonobstructive bowel gas pattern with centralization of visualized loops of large bowel consistent with previously observed large volume ascites and diffuse anasarca. 3. Unchanged patchy alveolar opacities and small left-sided pleural effusion better characterized on same day x-ray of the chest from 04/09/2024.   I personally reviewed the images/study and I agree with the findings as stated by resident Omer Tsai. This study was interpreted at [facility], [place], [place].   MACRO: None     Dictation workstation:   [id number]    XR chest 1 view    Result Date: 4/9/2024  Interpreted By:  Kosmas, Servando,  and [place] STUDY: XR CHEST 1 VIEW;  4/9/2024 3:35 pm   INDICATION: Signs/Symptoms:central line placement, RIJ.   COMPARISON: Same day chest radiograph time stamped 1:34 p.m.   ACCESSION  NUMBER(S): [id number]   ORDERING CLINICIAN: RYAN GUERRERO   FINDINGS: Single AP view of the chest was provided.   Interval retraction of an endotracheal tube with tip now terminating 3.2 cm above the faby, in satisfactory positioning. Interval placement of a right internal jugular approach central venous catheter with tip overlying the superior cavoatrial junction.   CARDIOMEDIASTINAL SILHOUETTE: Similar obscuration of much of the cardiac border due to overlying pulmonary opacities.   LUNGS: Similar dense patchy alveolar opacities throughout the bilateral lungs, primarily in the right upper lobe. Probable small left pleural effusion. No pneumothorax.   ABDOMEN: Hyperdense material overlying the stomach. Mild dilation of multiple loops of bowel in the upper abdomen.   BONES: No acute osseous changes.       1. Satisfactory positioning of right internal jugular approach central venous catheter. 2. Unchanged appearance of patchy pulmonary opacities throughout the bilateral lungs, particularly in the right upper lobe, with a probable small left pleural effusion. 3. Hyperdense material overlying the stomach with multiple dilated loops of bowel in the upper abdomen. Correlate with same day CT.   I personally reviewed the image(s)/study and resident interpretation as stated by Dr. Lea Mccray MD. I agree with the findings as stated. This study was interpreted at [facility], [place], [place].   MACRO: None   Signed by: Servando Sosa 4/9/2024 4:09 PM Dictation workstation:   [id number]    CT head wo IV contrast    Result Date: 4/9/2024  Interpreted By:  Bob Mitchell, STUDY: CT HEAD WO IV CONTRAST;  4/9/2024 2:08 pm   INDICATION: Signs/Symptoms:AMS.   COMPARISON: None.   ACCESSION NUMBER(S): [id number]   ORDERING CLINICIAN: BRITT JOHNSON   TECHNIQUE: Noncontrast axial CT scan of head was performed.   FINDINGS: Parenchyma: There is no intracranial hemorrhage. The grey-white  differentiation is intact. There is no mass effect or midline shift. Patchy nonspecific white matter hypodensities involving the deep white matter of the bilateral cerebral hemispheres.   CSF Spaces: The ventricles, sulci and basal cisterns are within normal limits for age.   Extra-Axial Fluid: There is no extraaxial fluid collection.   Calvarium: The calvarium is unremarkable.   Paranasal sinuses: Visualized paranasal sinuses are clear.   Mastoids: Clear.   Orbits: Normal.   Soft tissues: Unremarkable.       No acute intracranial hemorrhage, mass effect, or CT apparent acute infarct.   Mild nonspecific patchy white matter hypodensity involving the bilateral cerebral hemispheres that may reflect age advanced chronic small vessel ischemic disease. MRI may be helpful for further characterization as clinically warranted.   MACRO: None   Signed by: Bob Mitchell 4/9/2024 2:30 PM Dictation workstation:   [id number]    XR chest 1 view    Result Date: 4/9/2024  Interpreted By:  Kosmas, Servando,  and [place]azia Lea STUDY: XR CHEST 1 VIEW;  4/9/2024 1:43 pm   INDICATION: Signs/Symptoms:Intubation.   COMPARISON: Chest radiograph 04/09/2024 time stamped 11:53 a.m..   ACCESSION NUMBER(S): [id number]   ORDERING CLINICIAN: BESS SEGOVIA   FINDINGS: Single AP supine radiograph of the chest was provided.   Interval intubation with endotracheal tube tip terminating 0.6 cm above the faby.   CARDIOMEDIASTINAL SILHOUETTE: Much of the cardiomediastinal silhouette is obscured by adjacent pulmonary opacities.   LUNGS: Increasing patchy alveolar pulmonary opacities throughout the bilateral lungs, particularly worsening in the right upper lung field and adjacent to the right heart border. Probable small left pleural effusion. No pneumothorax.   ABDOMEN: No remarkable upper abdominal findings.   BONES: No acute osseous changes.       1. Interval intubation with endotracheal tube 0.6 cm above the faby, recommend slight  retraction. 2. Worsening patchy alveolar opacities throughout the bilateral lungs, particularly in the right upper and middle lobes suspicious for multifocal pneumonia likely with a component of pulmonary edema.   I personally reviewed the image(s)/study and resident interpretation as stated by Dr. Lea Mccray MD. I agree with the findings as stated. This study was interpreted at [facility], [place], [place].   MACRO: None   Signed by: Servando Sosa 4/9/2024 2:19 PM Dictation workstation:   [id number]    XR chest 1 view    Result Date: 4/9/2024  Interpreted By:  Servando Sosa, STUDY: XR CHEST 1 VIEW;  4/9/2024 12:02 pm   INDICATION: Signs/Symptoms:Hypoxia.   COMPARISON: None.   ACCESSION NUMBER(S): [id number]   ORDERING CLINICIAN: BESS SEGOVIA   FINDINGS:         CARDIOMEDIASTINAL SILHOUETTE: Cardiomediastinal silhouette is normal in size and configuration.   LUNGS: There is extensive multifocal dense airspace disease worse in the right upper lung and the bases bilaterally.   ABDOMEN: No remarkable upper abdominal findings.   BONES: No acute osseous changes.       Extensive multifocal dense airspace disease compatible with multifocal pneumonia. CT or radiographic follow-up to resolution is advised   MACRO: None   Signed by: Servando Sosa 4/9/2024 12:49 PM Dictation workstation:   [id number]     This patient has a central line   Reason for the central line remaining today? Dialysis/Hemapheresis, Hemodynamic monitoring, and Parenteral medication      This patient is intubated   Reason for patient to remain intubated today? they have continued cardiopulmonary lability/instability and they have inadequate gas-exchange without positive pressure        Malnutrition Diagnosis Status: New  Malnutrition Diagnosis: Moderate malnutrition related to chronic disease or condition  As Evidenced by: noted moderate to severe fat and muscle wasting on physical exam; suspect pt  was not eating adequately PTA in light of EtOH abuse based on admit records from [facility]  I agree with the dietitian's malnutrition diagnosis.      Assessment/Plan   Principal Problem:    Sayda Beard is a 43-year-old female with past medical history of chronic pancreatitis per chart review, alcohol induced hepatic cirrhosis with prior upper GI bleed and hepatic encephalopathy who presented to the emergency department for lethargy and altered mental status.  While in the emergency department, patient was hypotensive and mental status worsened necessitating emergent intubation and initiation of vasopressors.  Currently suspect septic shock in setting of multifocal pneumonia seen on CT chest on admission, as well as some component of hemorrhagic shock given hemoglobin of 6.3 on admission.    Updates 4/10:   -75 grams of albumin this a.m. for SBP/concern for HRS/volume expansion  -In the afternoon, patient came hypoxic to 54 pO2 on ABG with 100% FiO2 and 10 of PEEP, ventilator changed to APRV to maximize oxygenation  -Started on inhaled epoprostenol as a temporizing measure  -Renal consulted plan to start CVVH today  -Patient growing Pseudomonas from respiratory culture; given recent hospitalization at [facility] at the beginning of March, will stop Zosyn and start meropenem 2 g once followed by 2 g every 12 hours given CVVH  -Sodium bicarb 125 mL an hour of 150 mill equivalents until CVVHD started for acidosis  -Decrease PPI to daily from twice daily  -Continue vitamin K 10 mg IV 4/10 through 4/11 for coagulopathy     NEURO  # Metabolic encephalopathy  #Hepatic encephalopathy  :: Recent admission at [facility] for hemorrhagic shock in the setting of hematemesis; hemoglobin was 3.2 on admission with lactate of 13; EGD during that admission revealed grade 1 gastric varices which were injected   :: Ammonia 115 on admission  :: Possible some component of encephalopathy from metabolic derangements from sepsis  :: TSH 17.27,  free T4 0.32  - VBG 7.26/27 with lactate 2.2  - Intubated in ED for AMS and airway protection  Plan:   -Rifaximin 550 mg twice daily  -Lactulose 20 g every 2 hours until bowel movements achieved; will then titrate to 3-4 bowel movements per day  -Antibiotics as under ID for sepsis  - 200mcg synthroid IV in ED   - 100mg hydrocortisone in ED   - Endocrinology consult for hypothyroidism     CARDIOVASCULAR  #Shock 2/2 septic from pneumonia and SBP  :: CT chest abdomen pelvis 4/9 showing multifocal pneumonia  :: diagnostic paracentesis in ED w/ 2,072,000 RBC, 17,078 WBC w. 76% PMNs; 4691 PMNs after correction for RBCs  - Hypotensive 70s/50s in ED, now on pressors   - Hb 6.3 on admission, previously 3.2 on admission at [facility], baseline ~11; s/p 1 unit PRBCs  - Strep and legionella antigens negative  - 75g 25% albumin, 2L LR on 4/9; 75g albumin 25% on 4/10  Plan:   - blood cultures x2, urinalysis w/ reflex culture, ascitic fluid as above  - Vanc, azithromycin (4/9-*)  - Stop zosyn (4/9-4/10) given Pseudomonas on respiratory culture and recent hospitalization  - Start meropenem 2g IV once followed by 2g q12 hrs given initiation of CVVH  - Currently on levophed and vasopressin, MAP goal >65  - Hydrocortisone 50mg q6hrs     PULMONARY  #Acute hypoxic respiratory failure 2/2 multifocal pneumonia  #Severe hypoxemia, possibly from multifocal pneumonia vs developing ARDS  :: CT chest on admission showing bilateral patchy infiltrates consistent with multifocal pneumonia  :: Hypoxic on presentation requiring nasal cannula, subsequently intubated for AMS and airway protection   - Pt became hypoxic on 100% FiO2 (pO2 54) w/ P/F 54 in setting of sepsis and multifocal pneumonia  - strep and legionella antigens negative   - MRSA nares positive  Plan:   - Will trial ventilation with APRV for oxygenation; SpO2 improved, will follow with serial ABGs  - Inhaled epoprostenol   - Abx per ID and shock  - resp culture growing 3+ Psa;  susceptibilities pending   - blood cultures x2   - Daily CXR  - Will hold off on proning and paralyzing patient given hemodynamic in-stability      #mixed anion gap metabolic acidosis and non-gap metabolic acidosis  - continued mixed gap likely 2/2 lactic acidosis/uremia and renal failure  Plan:   - Infection management as above  - s/p bicarb amp 4/10, start bicarb gtt 150mEq bag at 125mL/hr, then plan for CVVH per nephrology   - ABG prn to monitor acidosis and oxygenation     GI  # Acute decompensated liver cirrhosis secondary to ethanol  #Hepatic encephalopathy  #Spontaneous bacterial peritonitis  - hx of HE, recently admitted  - unclear if compliant w/ lactulose at home  - ammonia elevated on admission  - RUQ ultrasound without actionable findings; hold on therapeutic paracentesis given KEITH and hypotension  Plan:   - Abx for SBP as above  - s/p 75g 25% albumin 4/9 and 4/10, will hold on further albumin given concern for ARDS and concern for volume overload   - Lactulose 20g q2hr until bowel movements, then titrate for 3-4BM/day  - Rifaximin 550mg BID  - Hepatology consult, pt oxygenation too poor at this time for scoping, appreciate recommendations  - 80mg IV PPI in ED; decrease PPI to once daily   - Nutrition consult, OGT placed for enteral access     #EtOH use disorder  - Per tavo hannah since 3/9  Plan  - High dose thiamine 500mg IV  - Folic acid 1mg daily   -If extubated <72 hrs, recommend CIWA; currently on propofol      RENAL/  #KEITH stage 3  - creatinine 4.8 from baseline ~1 prior to last admission  - Likely multifactorial in setting of septic shock, possible HRS  Plan:   - appropriate volume resuscitation, 25mL urine output overnight  - Tadeo for accurate ins and outs  - Urine lytes indicative of pre-renal etiology   - CT scan without signs of hydronephrosis  - Strict I and o   - Nephrology consulted, appreciate recommendations, planning for CVVH given volume overload and acidosis  - PRN ABGs to  assess acidosis and hyperK   - Renally dose medications  - Avoid nephrotoxins      #HAGMA and NAGMA  -2/2 Lactic acidosis and renal failure  - management under pulmonary      INFECTIOUS DISEASE  #SBP  #Multifocal pneumonia  - SBP on diagnostic paracentesis  - Pneumonia on CT scan w/ AHRF  - Strep and legionella antigens negative   Plan:   - Vancomycin (4/9-*)  - Zosyn discontinued (4/9-4/10)  - Start meropenem 2g once then 2g q12 hrs once on CVVH; broadening given known pseudomonas and recent hospitalization; will narrow based on culture susceptibilities   - Azithromycin 500mg (4/9-*)  - MRSA nares positive  - Ascites culture pending   - Respiratory culture growing abundant pseudomonas   - Blood cultures x2      HEME/ONC  #Acute on chronic macrocytic anemia  - Baseline Hb ~11 per [facility] records, 3.2 on recent admission there, 6.3 on admission here  - Family denies hematemesis prior to this admission  Plan:   - s/p 1 unit PRBCs  - Iron, tibc, ferritin, b12, folate   - Thyroid as below   - Transfuse Hb<7     #Thrombocytopenia  - Likely secondary Cirrhosis and septic shock  Plan:  - Transfuse Plt <10 or <50 and bleeding  - DIC labs w/ fibrinogen, haptoglobin, LDH, peripheral smear      #Coagulopathy   - likely secondary to cirrhosis and septic shock  Plan:   - vitamin K 10mg IV x3 days (4/9-4/11)  - continue to monitor      MSK/RHEUM  #R inguinal fold laceration  - Wound consult      ENDOCRINE  #Hypothyroidism  - Unclear if chronic hypothyroidism, no TSH in EMR  - TSH 12.27 on admission, free T4 0.32  - s/p synthroid 200mcg IV in ED and hydrocortisone 100mg IV  Plan:   - Continue stress dose steroids as under CV  - Endocrinology consulted, appreciate recommendations:   - Levothyroxine 88mcg daily   - Insulin gtt for hyperglycemia    PSYCH  #MDD  - Holding home escitalopram 5mg daily      F: PRN, restricting fluid 2/2 possible ARDS  E: PRN  N: NPO  A: RIJ CVC (4/9), L radial A line (4/9), 2 20g IV, krishnamurthy (4/9), LIJ  trialysis line (4/10)  O2: APRV FiO2 100%, Inspiratory pressure 40, expiratory time 0.4 RR 18   Abx: Vanc, zosyn, azithro (4/9-*)  Gtt: levo 0.25, vaso 0.03, propofol 25mcg/kg/min, fentanyl 75mcg/hr, insulin, epi ordered but not running     NOK: Alexx Tomlin [phone]; [place] [phone]  Code status: DNR, ok with intubation         Woodrow Cage MD

## 2024-04-10 NOTE — ACP (ADVANCE CARE PLANNING)
Confirming Previous Code Status:   Advance Care Planning Note     Discussion Date: 04/10/24   Discussion Participants: patient and daughter    The patient wishes to discuss Advance Care Planning today and the following is a brief summary of our discussion.     Patient has capacity to make their own medical decisions: No  Health Care Agent/Surrogate Decision Maker documented in chart: Yes    Documents on file and valid:  Advance Directive/Living Will: No   Health Care Power of : No      Communication of Medical Status/Prognosis:   It was communicated to the daughter that patient is on mechanical ventilation on high settings but, is not maintaining appropriate oxygen saturation. She also likely has a large amount of fluid in her lungs and has an acidosis that likely will require hemodialysis. We expressed that her overall prognosis is poor.     Communication of Treatment Goals/Options: We communicated that we will attempt to use inhaled epoprostenol to improve her oxygenation and that she may need more central access for hemodialysis.     Treatment Decisions  Goals of Care: quality of life is prioritized; willing to accept low-burden treatments to achieve meaningful goals     Follow Up Plan  Daughter made patient DNR      Team Members  Sarah Araujo, Biju Ford, Rachelle Minor, Angel Cage    Time Statement: Total face to face time spent on advance care planning was  15 minutes with 10 minutes spent in counseling, including the explanation.    Biju Ford MD  4/10/2024 1:31 PM

## 2024-04-10 NOTE — PROGRESS NOTES
Physical Therapy                 Therapy Communication Note    Patient Name: Vera Beard  MRN: [id number]  Today's Date: 4/10/2024     Discipline: Physical Therapy    Missed Visit Reason: Missed Visit Reason:  (patient intubated; on 100% FiO2; currently also on vasopressor support as well. PT will hold.)    Missed Time: Attempt    Comment:

## 2024-04-10 NOTE — SIGNIFICANT EVENT
Restraints indicated as alternative therapies have been attempted and have been ineffective.  Restrain with soft wrist restraints until medical devices discontinued and/or patient able to participate with plan of care.

## 2024-04-10 NOTE — CARE PLAN
Problem: Skin  Goal: Decreased wound size/increased tissue granulation at next dressing change  Outcome: Progressing  Goal: Participates in plan/prevention/treatment measures  Outcome: Progressing  Goal: Prevent/manage excess moisture  Outcome: Progressing  Goal: Prevent/minimize sheer/friction injuries  Outcome: Progressing  Goal: Promote/optimize nutrition  Outcome: Progressing  Goal: Promote skin healing  Outcome: Progressing     Problem: Safety - Medical Restraint  Goal: Remains free of injury from restraints (Restraint for Interference with Medical Device)  Outcome: Progressing  Goal: Free from restraint(s) (Restraint for Interference with Medical Device)  Outcome: Progressing

## 2024-04-10 NOTE — CONSULTS
Inpatient consult to Endocrinology  Consult performed by: Tiffany Walters MD  Consult ordered by: Rachelle Minor MD        Reason For Consult: Hypothyroidism.    History Of Present Illness  This is a 42 yo female with underlying alcoholic liver cirrhosis c/b acute pancreatitis (2022) and gastric varices seen in recent EGD 3/2024, hepatic steatosis, cholelithiasis, and hx of drug abuse who presented to the ED on 4/9/2024 from home due to being unresponsive, lethargic, hypotensive, and reports of hematemesis. Of note patient was recently at [facility] with similar presentation with complicated hospital course- intubation, cardiac arrest s/p intubation, and eventually patient improved, was transferred to the floor but left AMA on 3/9/2024.    ED course: initial vital signs BP 77/54, , RR 18, BG 30, 50. Patient was not able to protect her airways due to altered mental status and was intubated 4/9/2024. Additional procedures Central line right IJ left radial arterial line, diagnostic paracentesis with 7 cc of fluid removed. Labs: Hemoglobin 6.3, WBC 10.7, platelets 74, phosphorus 6.8, sodium 127 potassium 4.6, anion gap 17, bicarb 14, creatinine 4.8, BUN 18, calcium 7.8, albumin 1.5, AST 48, ALT 16, INR 1.9, ammonia 115, , TSH 17.2, free T4 0.3, thyroid peroxidase antibody less than 28, lipase 18, iron 58, ferritin 2, CK 86, lactate 2.  UDS positive for cannabinoids and cocaine.  Paracentesis fluid turbid red-brown WBC high.  MRSA PCR detected.  Procalcitonin 1.3. Last echo 3/2024 EF 75%Patient received 1 red blood cell pack.      Currently patient is in ICU bed.  Sedated, intubated, and on pressors. Endocrinology consulted due to hypothyroidism.  Patient does not have a history of hypothyroidism and is not on thyroid medication as an outpatient.  Also, patient's blood sugars initially were low. Patient started on scheduled IV hydrocortisone, now blood sugars are high. Received in the  mcg of levothyroxine  "IV once. Received total of 10 units of lispro sliding scale. Tried to contact family member on the EMR Terri Beard without success.      Past Medical History  As above.    Surgical History  Unable to verify.      Social History  History of alcohol and drugs abuse.     Family History  Non contributory.      Allergies  Patient has no known allergies.    Review of Systems  Unable to access due to being intubated and sedated.      Physical Exam  Constitutional: intubated, sedated, on pressors.   Eyes: miosis, clear sclera.  Head/Neck: no apparent injury, No JVD, no bruits. ET and NG placed.   Respiratory: vent breath sounds with BL rhonchi.   Cardiovascular: regular rate and rhythm, no murmurs.   Gastrointestinal: globus abdomen, palpable liver, distended, no rebound.  Genitourinary: krishnamurthy in place.  Musculoskeletal: no joint swelling.   Extremities: no LE edema.  Skin: warm and dry, no rashes.  Neuro: limited evaluation, reflexes normal.     Last Recorded Vitals  Blood pressure 100/60, pulse 78, temperature 36.1 °C (97 °F), temperature source Temporal, resp. rate 20, height 1.651 m (5' 5\"), weight 71.9 kg (158 lb 8.2 oz), SpO2 97%.    Relevant Results    Results from last 7 days   Lab Units 04/10/24  0733 04/10/24  0511 04/10/24  0409 04/10/24  0015 04/09/24 2007 04/09/24  1707 04/09/24  1115 04/09/24  1105   POCT GLUCOSE mg/dL 269*  --  253* 261* 195* 193*   < >  --    GLUCOSE mg/dL  --  238*  --   --   --   --   --  39*    < > = values in this interval not displayed.          Latest Reference Range & Units 04/09/24 11:05 04/10/24 05:11   Thyroxine, Free 0.78 - 1.48 ng/dL 0.32 (L) 1.31   Thyroid Stimulating Hormone 0.44 - 3.98 mIU/L 17.27 (H) 6.31 (H)   Triiodothyronine, Free 2.3 - 4.2 pg/mL  0.3 (L)   (L): Data is abnormally low  (H): Data is abnormally high    Thyroid Peroxidase (TPO) Antibody   Date/Time Value Ref Range Status   04/09/2024 05:30 PM <28 <=60 IU/mL Final        Assessment/Plan   Principal " Problem:    Hypoglycemia    Assessment & Plan Endocrinology:    Elevated TSH with low FT4.  Hypothyroidism versus nonthyroidal illness.  Patient does not have a history of hypothyroidism, previous TSH from 3/2/2024 was 0.88.  Patient was recently at [facility] with complicated hospital course- intubation, cardiac arrest s/p intubation, and eventually patient improved, was transferred to the floor but left AMA on 3/9/2024.  Possible elevated TSH (17) was due to recovery phase of severe nonthyroidal illness.  TPO antibody negative.  Here in the ED on 4/9/2024 received 200 mcg of levothyroxine IV once. TSH on 4/10/2024 dropped to 6.3 and free T4  1.3.  We do not think that her current critical condition (on vent, pressors, started on CVVH) is related with her thyroid function. For now will continue levothyroxine 88 mcg through the NG tube and repeat TSH and free T4 on 4/17/2024.    Hyperglycemia.  Initial POC glucose levels were low but after patient was restarted on stress dose of hydrocortisone her glucose levels were trending up and ICU team started her on insulin sliding scale. Patient is not on tube feedings diet yet. We recommend start insulin drip for better glucose control.         *This document was created using dragon dictation and may contain unintended error.    Tiffany Walters MD   Internal Medicine-PGY2

## 2024-04-11 NOTE — PROGRESS NOTES
Vancomycin Dosing by Pharmacy  FOLLOW UP    Vera Beard is a 43 y.o. female who Pharmacy is consulted to dose vancomycin for pneumonia.     Based on the patient's indication and renal status, this patient is being dosed based on a goal vancomycin level of 15-20.     Current vancomycin dose:  Dose by level.     Most recent vancomycin trough: 9.9 mcg/mL    Renal function is currently dependent on Continuous Renal Replacement Therapy (CRRT). Started 4/10 @ 1600    Estimated Creatinine Clearance: 19.5 mL/min (A) (by C-G formula based on SCr of 3.62 mg/dL (H)).    Vancomycin   Date Value Ref Range Status   04/10/2024 9.9 5.0 - 20.0 ug/mL Final       Results from last 7 days   Lab Units 04/10/24  1759 04/10/24  1524 04/10/24  0511 04/09/24  2358 04/09/24  1730 04/09/24  1105   CREATININE mg/dL 3.62*  --  4.61*  --   --  4.80*   BUN mg/dL 15  --  17  --   --  18   WBC AUTO x10*3/uL 16.1* 16.2*  16.2* 15.0* 13.0*   < > 10.7    < > = values in this interval not displayed.        Visit Vitals  /60   Pulse 58   Temp (!) 31.9 °C (89.4 °F) (Esophageal) Comment: 1900--Bear hugger blanket in place   Resp 18       Gram Stain   Date/Time Value Ref Range Status   04/09/2024 05:16 PM (1+) Rare Polymorphonuclear leukocytes (A)  Preliminary   04/09/2024 05:16 PM (3+) Moderate Gram negative bacilli (A)  Preliminary   04/09/2024 05:16 PM (1+) Rare Gram positive cocci (A)  Preliminary     Urine Culture   Date/Time Value Ref Range Status   04/09/2024 01:42 PM No growth  Final     Blood Culture   Date/Time Value Ref Range Status   04/09/2024 11:16 AM No growth at 1 day  Preliminary   04/09/2024 11:16 AM No growth at 1 day  Preliminary        Assessment/Plan    Vancomycin level is below goal range of 15-20. Will increase vancomycin dose [id number] x 1 dose then  750 mg every 12 hours starting 4/11 @0800.    The next vancomycin level will be ordered for 4/11 at 1800, unless clinically indicated sooner.  Will continue to monitor renal  function daily while on vancomycin and order serum creatinine at least every 48 hours if not already ordered.  Will follow for continued vancomycin needs, clinical response, and signs/symptoms of toxicity.       Lenka De Jesus, PharmD

## 2024-04-11 NOTE — SIGNIFICANT EVENT
Clinical Ethics was consulted today for a concern that Ms. Beard was a patient without proxy, but the pt's significant other was able to provide the medical team with contact information for her daughter. She is now willing to act as the pt's surrogate decision maker and provided consent for CVVH.    Ethics will now sign off.     Reji Griffin JD, MA, EMILIANA-C

## 2024-04-11 NOTE — PROGRESS NOTES
Daily Progress Note    Vera Beard is a 43 y.o. female admitted on 4/9/2024 10:55 AM for possible HRS and HD management. Pt is hyponatremic and acidemic.     Overnight:  No acute events reported. Pseudomonas grown on resp Cx. Meropenem started and Zosyn d/pelon.     Subjective   Pt was seen at the bedside. Still sedated and intubated. Still requiring pressor support.     Objective   Vitals: I/O:   Vitals:    04/11/24 1003   BP:    Pulse:    Resp:    Temp:    SpO2: 92%        24hr Min/Max:  Temp  Min: 31.9 °C (89.4 °F)  Max: 35.6 °C (96.1 °F)  Pulse  Min: 53  Max: 82  Resp  Min: 14  Max: 22  SpO2  Min: 69 %  Max: 99 %   Intake/Output Summary (Last 24 hours) at 4/11/2024 1110  Last data filed at 4/11/2024 1000  Gross per 24 hour   Intake 4713.25 ml   Output 266 ml   Net 4447.25 ml        Net IO Since Admission: 7,720.86 mL [04/11/24 1110]      PE:  General: Intubated, sedated; does not withdraw to pain   HEENT:  L conjunctival hemorrhage, Broken teeth, blood   Skin: No suspect lesions or rashes noted on visible skin  Chest: On mechanical ventilation  Cardiac: Regular rate and rhythm, normal s1, s2, no M/R/G, no JVD  Abdomen: Soft, ND, NT, no involuntary guarding  : No flank pain, indwelling urinary catheter in place  EXT: b/l LE +2 edema     Labs:  CBC RFP   Lab Results   Component Value Date    WBC 21.5 (H) 04/11/2024    HGB 7.5 (L) 04/11/2024    HCT 20.2 (L) 04/11/2024    MCV 87 04/11/2024    PLT 59 (L) 04/11/2024    NEUTROABS 8.26 (H) 04/09/2024    Lab Results   Component Value Date     (L) 04/11/2024     (L) 04/11/2024    K 3.7 04/11/2024    K 3.7 04/11/2024    CL 97 (L) 04/11/2024    CL 98 04/11/2024    CO2 17 (L) 04/11/2024    CO2 16 (L) 04/11/2024    BUN 12 04/11/2024    BUN 12 04/11/2024    CREATININE 2.87 (H) 04/11/2024    CREATININE 2.80 (H) 04/11/2024     Lab Results   Component Value Date    MG 1.80 04/11/2024    PHOS 3.3 04/11/2024    PHOS 3.4 04/11/2024    CALCIUM 7.3 (L) 04/11/2024          Hepatic Function ABG/VBG   Lab Results   Component Value Date    ALT 14 04/11/2024    AST 51 (H) 04/11/2024    ALKPHOS 72 04/11/2024     Lab Results   Component Value Date    BILIDIR 1.4 (H) 04/11/2024      Lab Results   Component Value Date    PROTIME 22.5 (H) 04/10/2024    APTT 61 (H) 04/10/2024    INR 2.0 (H) 04/10/2024    Lab Results   Component Value Date    LACTATE 5.5 (HH) 04/11/2024        Results from last 7 days   Lab Units 04/11/24  0200 04/10/24  1759 04/10/24  1524 04/10/24  0511 04/09/24  1730 04/09/24  1105   WBC AUTO x10*3/uL 21.5* 16.1* 16.2*  16.2* 15.0*   < > 10.7   HEMOGLOBIN g/dL 7.5* 7.8* 7.6*  7.6* 9.3*   < > 6.3*   HEMATOCRIT % 20.2* 21.4* 23.5*  23.5* 25.4*   < > 17.2*   PLATELETS AUTO x10*3/uL 59* 71* 77*  77* 90*   < > 74*   NEUTROS PCT AUTO %  --   --   --   --   --  77.0   LYMPHO PCT MAN % 15.0  --  17.0 4.2   < >  --    LYMPHS PCT AUTO %  --   --   --   --   --  12.8   MONO PCT MAN % 52.0  --  11.0 4.2   < >  --    MONOS PCT AUTO %  --   --   --   --   --  9.1   EOSINO PCT MAN % 1.0  --  0.0 0.0   < >  --    EOS PCT AUTO %  --   --   --   --   --  0.3    < > = values in this interval not displayed.     Results from last 7 days   Lab Units 04/11/24  0200 04/10/24  1759 04/10/24  0511   SODIUM mmol/L 127*  128* 128* 125*   POTASSIUM mmol/L 3.7  3.7 4.0 5.0   CHLORIDE mmol/L 98  97* 97* 99   CO2 mmol/L 16*  17* 17* 11*   BUN mg/dL 12  12 15 17   CREATININE mg/dL 2.80*  2.87* 3.62* 4.61*   CALCIUM mg/dL 7.3* 7.4* 7.3*   PROTEIN TOTAL g/dL 5.4* 5.5* 5.8*   BILIRUBIN TOTAL mg/dL 2.6* 2.4* 2.2*   ALK PHOS U/L 72 81 124*   ALT U/L 14 13 19   AST U/L 51* 44* 57*   GLUCOSE mg/dL 194* 240* 238*     Results from last 7 days   Lab Units 04/11/24  0200 04/10/24  1759 04/10/24  0511   MAGNESIUM mg/dL 1.80 1.82 1.85     Results from last 7 days   Lab Units 04/11/24  0712 04/11/24  0607 04/11/24  0422 04/11/24  0213 04/11/24  0200 04/11/24  0058 04/10/24  1804 04/10/24  1759 04/10/24  0733  04/10/24  0511 04/09/24  1115 04/09/24  1105   POCT GLUCOSE mg/dL 164* 141* 155* 210*  --  215*   < >  --    < >  --    < >  --    GLUCOSE mg/dL  --   --   --   --  194*  --   --  240*  --  238*  --  39*    < > = values in this interval not displayed.     ABG 4/11 ~2AM:  Blood Gas Arterial Full Panel   Result Value Ref Range    POCT pH, Arterial 7.37 (L) 7.38 - 7.42 pH    POCT pCO2, Arterial 29 (L) 38 - 42 mm Hg    POCT pO2, Arterial 134 (H) 85 - 95 mm Hg    POCT SO2, Arterial 99 94 - 100 %    POCT Oxy Hemoglobin, Arterial 97.9 94.0 - 98.0 %    POCT Hematocrit Calculated, Arterial 23.0 (L) 36.0 - 46.0 %    POCT Sodium, Arterial 126 (L) 136 - 145 mmol/L    POCT Potassium, Arterial 3.8 3.5 - 5.3 mmol/L    POCT Chloride, Arterial 98 98 - 107 mmol/L    POCT Ionized Calcium, Arterial 1.06 (L) 1.10 - 1.33 mmol/L    POCT Glucose, Arterial 153 (H) 74 - 99 mg/dL    POCT Lactate, Arterial 4.3 (HH) 0.4 - 2.0 mmol/L    POCT Base Excess, Arterial -7.6 (L) -2.0 - 3.0 mmol/L    POCT HCO3 Calculated, Arterial 16.8 (L) 22.0 - 26.0 mmol/L    POCT Hemoglobin, Arterial 7.6 (L) 12.0 - 16.0 g/dL    POCT Anion Gap, Arterial 15 10 - 25 mmo/L    Patient Temperature 37.0 degrees Celsius    FiO2 80 %     Imaging:  XR chest 1 view  Result Date: 4/10/2024  1. Multifocal consolidative opacities with increased interstitial markings. Findings may be due to severe edema or multifocal infection. Aeration of the right upper lung is improved compared to prior study.   2. Small left pleural effusion.   3. Medical devices as described            US liver with doppler  Result Date: 4/10/2024  1. Hepatic cirrhosis with large volume ascites. Limited evaluation of the portal venous system. 2. Cholelithiasis without acute cholecystitis.       CT chest abdomen pelvis wo IV contrast  Result Date: 4/9/2024  Chest   1. Endotracheal tube in place with the tip terminating at the level of the faby/right mainstem bronchus. Recommend retracting tube.   2. Extensive  consolidation/ground-glass opacities throughout both lungs consistent with multifocal pneumonia.   3. Small bilateral pleural effusions.   4. Small pericardial effusion.      Abdomen-Pelvis   1. Large volume ascites.   2. Severe diffuse hepatic steatosis.   3. Cholelithiasis.   4. Severe body wall anasarca.        Kidneys Ureter:   Bilateral kidneys are mildly atrophic.   Punctate 0.3 cm calculus at the right upper pole.   No hydronephrosis.       Medications  Scheduled Medications:  PRN Medications:    Scheduled medications  folic acid, 1 mg, oral, Daily  hydrocortisone sodium succinate, 50 mg, intravenous, q6h  insulin lispro, 0-10 Units, subcutaneous, TID with meals  lactulose, 20 g, oral, q2h  levothyroxine, 88 mcg, oral, Daily  magnesium sulfate, 1 g, intravenous, Once  meropenem, 2 g, intravenous, q12h  pantoprazole, 40 mg, intravenous, BID  rifAXIMin, 550 mg, oral, q12h TEZ  thiamine, 500 mg, intravenous, q8h TEZ  vancomycin, 750 mg, intravenous, q12h      Continuous medications  epoprostenol, 0.01 mcg/kg/min (Ideal), Last Rate: 0.01 mcg/kg/min (04/11/24 0929)  fentaNYL,  mcg/hr, Last Rate: 75 mcg/hr (04/11/24 1000)  norepinephrine, 0-3 mcg/kg/min (Order-Specific)  norepinephrine, 0-3 mcg/kg/min, Last Rate: 0.28 mcg/kg/min (04/11/24 1051)  oxygen,   PrismaSol 4/2.5, 2,100 mL/hr, Last Rate: 2,100 mL/hr (04/10/24 1430)  propofol, 5-50 mcg/kg/min, Last Rate: 20 mcg/kg/min (04/11/24 1015)  vasopressin, 0.03 Units/min, Last Rate: 0.03 Units/min (04/11/24 1051)     PRN medications  PRN medications: dextrose, dextrose, fentaNYL, glucagon, insulin regular, ketamine, oxygen, rocuronium, vancomycin         Assessment:  Ms. Vera Beard is a 43 y.o. female w/ a PMH of Alcohol-induced Liver Cirrhosis, Chronic pancreatitis, Hx of UGIB (no banding done), Hepatic Encephalopathy (on home lactulose, some non-compliance) that that presented to the ED for Lethargy and AMS, and then seen to be in shock, likely  multifactorial from sepsis (MF PNA and SBP), hemorrhagic (Hgb 6.3, hemoptysis) and now intubated in the MICU requiring pressor support.      #KEITH, Oliguric, Stage 3  ::Bl Cr 0.7-0.8  ::Current Cr improved w/ continued diuresis 4.6->2.8; still oliguric to 270->351ccs  Etiology:  Likely multifactorial iso of septic/hemorrhagic shock, Utox+ for cocaine; can compromise renal perfusion. She has significant 3rd spacing which may also affect preload and global perfusion as well. Contrast use, Vanc/Zosyn; are nephrotoxic and can cause direct renal injury. Pt is now on Vanc/Meropenem/Azithro given Respiratory Cx smear positive for Pseudomonas.      #HRS consideration  :: Current KEITH has multiple likely causes other than HRS     #Acidosis, metabolic acidosis (AGMA, NAGMA), resolved   ::ABG 7.20, Bicarb 11, AG 15, d-d gap 5, PCO2 26 (appropriately compensated)  ::Glucose on intake was 39, LA 4, K+ is wnl  ::NAGMA likely related to GI losses from Lactulose  ::Last ABG 4/11 pH 7.37  ::Lactic acid stable at 4.3  - Primary team started Bicarb gtt but now d/pelon. Currently on CVVH.      #Acute moderate Hyponatremia, hypervolemic  ::Na 128 4/11  ::Likely etiology 2/2 Liver cirrhosis  - On CVVH now and improving with diuresis     Recommendations:  Pt is HD unstable and will require supportive management for now  - Avoid Nephrotoxins, contrast if possible  - Strict Is/Os  - Renal dosing for medications for latest eGFR, follow medication trough as appropriate  - Avoid hypotension/rapid fluctuations in BPs; Keep SBP >100, MAPs >65  - Na levels improving, will CTM  - Currently on CVVH - plan to continue      - Nephrology will continue to follow and manage RRT. Daily decision for modality,      Assessment and Plan discussed with Attending Nephrologist, Dr. Garcia.      Jose Antonio Lal MD  Prelim PGY-1 Internal Medicine   Nephrology Pager # [phone]

## 2024-04-11 NOTE — CONSULTS
Wound Care Consult     Visit Date: 4/11/2024      Patient Name: Vera Beard         MRN: [id number]           YOB: 1980     Reason for Consult: assess Left groin wound              Pertinent Labs:   Albumin   Date Value Ref Range Status   04/11/2024 2.5 (L) 3.4 - 5.0 g/dL Final     Albumin, Fluid   Date Value Ref Range Status   04/09/2024 <0.5 Not established g/dL Final       Wound Assessment:  Wound 04/09/24 Other (comment) Groin (Active)   Wound Image   04/10/24 1348   Site Assessment Red;Pink 04/11/24 1700   Nel-Wound Assessment Edematous 04/11/24 1700   Non-staged Wound Description Full thickness 04/11/24 1700   Shape circular 04/11/24 1700   Wound Length (cm) 1.5 cm 04/11/24 1700   Wound Width (cm) 1 cm 04/11/24 1700   Wound Surface Area (cm^2) 1.5 cm^2 04/11/24 1700   Wound Depth (cm) 0.3 cm 04/11/24 1700   Wound Volume (cm^3) 0.45 cm^3 04/11/24 1700   Margins Well-defined edges 04/11/24 1700   Drainage Description Serous 04/11/24 1700   Drainage Amount Moderate 04/11/24 1700   Dressing Foam 04/11/24 1700   Dressing Changed New 04/11/24 1700       Wound Team Summary Assessment:   Wound location:left groin   size:1.5 x 1 x 0.3 cm                             undermining: no     tracking: no                                       Wound type: unknown   Wound bed: red/ pink with yellow  tissue   Draining: moderate serous   Periwound skin: intact, very edematous   Therapeutic surface: pulmonary progressa     Recommendation: Daily  Cleanse area and apply Mepilex Transfer   Place interdry in groin (B/L)   May also place dry dressing over wound and change as needed to keep wound dry.               Wound Team Plan: Please review recommendations. If you agree with recommendations,place as order in EPIC.     Renuka RODRIGUEZ   4/11/2024  5:36 PM

## 2024-04-11 NOTE — CARE PLAN
Problem: Safety  Goal: Patient will be injury free during hospitalization  Outcome: Progressing  Goal: I will remain free of falls  Outcome: Progressing     Problem: Daily Care  Goal: Daily care needs are met  Outcome: Progressing     Problem: Safety - Medical Restraint  Goal: Remains free of injury from restraints (Restraint for Interference with Medical Device)  Outcome: Progressing   The patient's goals for the shift include      The clinical goals for the shift include patient maintains map >65    Over the shift, the patient did not make progress toward the following goals. Barriers to progression include chronic illness. Recommendations to address these barriers include per MD order.

## 2024-04-11 NOTE — PROGRESS NOTES
Occupational Therapy                 Therapy Communication Note    Patient Name: Vera Beard  MRN: [id number]  Today's Date: 4/11/2024     Discipline: Occupational Therapy    Missed Visit Reason: Missed Visit Reason: Patient placed on medical hold (Pt is not medically appropriate for OT services at this time.)    Missed Time: Thelma Poe OT  04/11/2024

## 2024-04-11 NOTE — PROGRESS NOTES
"Vera Beard is a 43 y.o. female on day 2 of admission presenting with Hypoglycemia.    Subjective   Overnight: Overnight, patient was able to tolerate CVVH, but no fluid removal 2/2 hypotension. Oxygenation improved. Still deeply sedated.        Objective     Physical Exam  Constitutional:       Comments: Intubated, sedated; not responding/following   HENT:      Mouth/Throat:      Mouth: Mucous membranes are moist.      Pharynx: Oropharynx is clear.   Eyes:      Pupils: Pupils are equal, round, and reactive to light.      Comments: L conjunctival hemorrhage   Cardiovascular:      Rate and Rhythm: Normal rate and regular rhythm.      Pulses: Normal pulses.      Heart sounds: No murmur heard.  Pulmonary:      Comments: Coarse bilateral rhonchi, R>L  Abdominal:      Comments: Distended, firm   Musculoskeletal:      Right lower leg: Edema present.      Left lower leg: Edema present.   Skin:     General: Skin is dry.      Capillary Refill: Capillary refill takes 2 to 3 seconds.   Neurological:      Comments: Sedated, not responding/following         Last Recorded Vitals  Blood pressure 100/60, pulse 68, temperature 34.7 °C (94.5 °F), temperature source Esophageal, resp. rate 14, height 1.651 m (5' 5\"), weight 71.9 kg (158 lb 8.2 oz), SpO2 90%.  Intake/Output last 3 Shifts:  I/O last 3 completed shifts:  In: 6832.3 (95 mL/kg) [I.V.:2562.3 (35.6 mL/kg); Blood:650; NG/GT:620; IV Piggyback:3000]  Out: 406 (5.6 mL/kg) [Urine:55 (0 mL/kg/hr); Emesis/NG output:200; Other:151]  Weight: 71.9 kg     Relevant Results  Scheduled medications  bisacodyl, 10 mg, rectal, Daily  folic acid, 1 mg, oral, Daily  hydrocortisone sodium succinate, 50 mg, intravenous, q6h  insulin lispro, 0-10 Units, subcutaneous, TID with meals  lactulose, 20 g, oral, q2h  levothyroxine, 88 mcg, oral, Daily  magnesium sulfate, 1 g, intravenous, Once  meropenem, 2 g, intravenous, q12h  pantoprazole, 40 mg, intravenous, BID  rifAXIMin, 550 mg, oral, q12h " TEZ  thiamine, 500 mg, intravenous, q8h TEZ  vancomycin, 750 mg, intravenous, q12h      Continuous medications  epoprostenol, 0.01 mcg/kg/min (Ideal), Last Rate: 0.01 mcg/kg/min (04/11/24 0929)  fentaNYL,  mcg/hr, Last Rate: 75 mcg/hr (04/11/24 1000)  norepinephrine, 0-3 mcg/kg/min (Order-Specific), Last Rate: 0.3 mcg/kg/min (04/11/24 1135)  oxygen,   PrismaSol 4/2.5, 2,100 mL/hr, Last Rate: 2,100 mL/hr (04/10/24 1430)  propofol, 5-50 mcg/kg/min, Last Rate: 20 mcg/kg/min (04/11/24 1015)  vasopressin, 0.03 Units/min, Last Rate: 0.03 Units/min (04/11/24 1051)      PRN medications  PRN medications: dextrose, dextrose, fentaNYL, glucagon, insulin regular, ketamine, oxygen, rocuronium, vancomycin  Results for orders placed or performed during the hospital encounter of 04/09/24 (from the past 24 hour(s))   Blood Gas Arterial Full Panel   Result Value Ref Range    POCT pH, Arterial 7.21 (LL) 7.38 - 7.42 pH    POCT pCO2, Arterial 32 (L) 38 - 42 mm Hg    POCT pO2, Arterial 54 (L) 85 - 95 mm Hg    POCT SO2, Arterial 81 (L) 94 - 100 %    POCT Oxy Hemoglobin, Arterial 80.1 (L) 94.0 - 98.0 %    POCT Hematocrit Calculated, Arterial 23.0 (L) 36.0 - 46.0 %    POCT Sodium, Arterial 124 (L) 136 - 145 mmol/L    POCT Potassium, Arterial 4.4 3.5 - 5.3 mmol/L    POCT Chloride, Arterial 98 98 - 107 mmol/L    POCT Ionized Calcium, Arterial 1.09 (L) 1.10 - 1.33 mmol/L    POCT Glucose, Arterial 289 (H) 74 - 99 mg/dL    POCT Lactate, Arterial 4.3 (HH) 0.4 - 2.0 mmol/L    POCT Base Excess, Arterial -13.9 (L) -2.0 - 3.0 mmol/L    POCT HCO3 Calculated, Arterial 12.8 (L) 22.0 - 26.0 mmol/L    POCT Hemoglobin, Arterial 7.6 (L) 12.0 - 16.0 g/dL    POCT Anion Gap, Arterial 18 10 - 25 mmo/L    Patient Temperature 37.0 degrees Celsius    FiO2 100 %   Blood Gas Arterial Full Panel   Result Value Ref Range    POCT pH, Arterial 7.20 (LL) 7.38 - 7.42 pH    POCT pCO2, Arterial 33 (L) 38 - 42 mm Hg    POCT pO2, Arterial 128 (H) 85 - 95 mm Hg    POCT  SO2, Arterial 100 94 - 100 %    POCT Oxy Hemoglobin, Arterial 98.8 (H) 94.0 - 98.0 %    POCT Hematocrit Calculated, Arterial 23.0 (L) 36.0 - 46.0 %    POCT Sodium, Arterial 124 (L) 136 - 145 mmol/L    POCT Potassium, Arterial 4.2 3.5 - 5.3 mmol/L    POCT Chloride, Arterial 97 (L) 98 - 107 mmol/L    POCT Ionized Calcium, Arterial 1.09 (L) 1.10 - 1.33 mmol/L    POCT Glucose, Arterial 296 (H) 74 - 99 mg/dL    POCT Lactate, Arterial 4.4 (HH) 0.4 - 2.0 mmol/L    POCT Base Excess, Arterial -14.0 (L) -2.0 - 3.0 mmol/L    POCT HCO3 Calculated, Arterial 12.9 (L) 22.0 - 26.0 mmol/L    POCT Hemoglobin, Arterial 7.7 (L) 12.0 - 16.0 g/dL    POCT Anion Gap, Arterial 18 10 - 25 mmo/L    Patient Temperature 37.0 degrees Celsius    FiO2 100 %   CBC   Result Value Ref Range    WBC 16.2 (H) 4.4 - 11.3 x10*3/uL    nRBC 0.0 0.0 - 0.0 /100 WBCs    RBC 2.41 (L) 4.00 - 5.20 x10*6/uL    Hemoglobin 7.6 (L) 12.0 - 16.0 g/dL    Hematocrit 23.5 (L) 36.0 - 46.0 %    MCV 98 80 - 100 fL    MCH 31.5 26.0 - 34.0 pg    MCHC 32.3 32.0 - 36.0 g/dL    RDW 21.1 (H) 11.5 - 14.5 %    Platelets 77 (L) 150 - 450 x10*3/uL   Calcium, Ionized CRRT   Result Value Ref Range    POCT CRRT, Calcium Ionized 1.02 Reference range not established mmol/L   Lactate Dehydrogenase   Result Value Ref Range     (H) 84 - 246 U/L   Haptoglobin   Result Value Ref Range    Haptoglobin <10 (L) 37 - 246 mg/dL   CBC and Auto Differential   Result Value Ref Range    WBC 16.2 (H) 4.4 - 11.3 x10*3/uL    nRBC 0.0 0.0 - 0.0 /100 WBCs    RBC 2.41 (L) 4.00 - 5.20 x10*6/uL    Hemoglobin 7.6 (L) 12.0 - 16.0 g/dL    Hematocrit 23.5 (L) 36.0 - 46.0 %    MCV 98 80 - 100 fL    MCH 31.5 26.0 - 34.0 pg    MCHC 32.3 32.0 - 36.0 g/dL    RDW 21.1 (H) 11.5 - 14.5 %    Platelets 77 (L) 150 - 450 x10*3/uL    Immature Granulocytes %, Automated 0.3 0.0 - 0.9 %    Immature Granulocytes Absolute, Automated 0.05 0.00 - 0.70 x10*3/uL   Manual Differential   Result Value Ref Range    Neutrophils %,  Manual 17.0 40.0 - 80.0 %    Bands %, Manual 47.0 0.0 - 5.0 %    Lymphocytes %, Manual 17.0 13.0 - 44.0 %    Monocytes %, Manual 11.0 2.0 - 10.0 %    Eosinophils %, Manual 0.0 0.0 - 6.0 %    Basophils %, Manual 0.0 0.0 - 2.0 %    Metamyelocytes %, Manual 6.0 0.0 - 0.0 %    Myelocytes %, Manual 2.0 0.0 - 0.0 %    Seg Neutrophils Absolute, Manual 2.75 1.20 - 7.00 x10*3/uL    Bands Absolute, Manual 7.61 (H) 0.00 - 0.70 x10*3/uL    Lymphocytes Absolute, Manual 2.75 1.20 - 4.80 x10*3/uL    Monocytes Absolute, Manual 1.78 (H) 0.10 - 1.00 x10*3/uL    Eosinophils Absolute, Manual 0.00 0.00 - 0.70 x10*3/uL    Basophils Absolute, Manual 0.00 0.00 - 0.10 x10*3/uL    Metamyelocytes Absolute, Manual 0.97 0.00 - 0.00 x10*3/uL    Myelocytes Absolute, Manual 0.32 0.00 - 0.00 x10*3/uL    Total Cells Counted 100     Neutrophils Absolute, Manual 10.36 (H) 1.20 - 7.70 x10*3/uL    RBC Morphology See Below     Polychromasia Mild     Teardrop Cells Few     Flor Cells Few    POCT GLUCOSE   Result Value Ref Range    POCT Glucose 333 (H) 74 - 99 mg/dL   POCT GLUCOSE   Result Value Ref Range    POCT Glucose 303 (H) 74 - 99 mg/dL   Blood Gas Arterial Full Panel   Result Value Ref Range    POCT pH, Arterial 7.23 (LL) 7.38 - 7.42 pH    POCT pCO2, Arterial 35 (L) 38 - 42 mm Hg    POCT pO2, Arterial 84 (L) 85 - 95 mm Hg    POCT SO2, Arterial 98 94 - 100 %    POCT Oxy Hemoglobin, Arterial 96.4 94.0 - 98.0 %    POCT Hematocrit Calculated, Arterial 23.0 (L) 36.0 - 46.0 %    POCT Sodium, Arterial 125 (L) 136 - 145 mmol/L    POCT Potassium, Arterial 4.2 3.5 - 5.3 mmol/L    POCT Chloride, Arterial      POCT Ionized Calcium, Arterial 1.03 (L) 1.10 - 1.33 mmol/L    POCT Glucose, Arterial 286 (H) 74 - 99 mg/dL    POCT Lactate, Arterial 4.6 (HH) 0.4 - 2.0 mmol/L    POCT Base Excess, Arterial -11.8 (L) -2.0 - 3.0 mmol/L    POCT HCO3 Calculated, Arterial 14.7 (L) 22.0 - 26.0 mmol/L    POCT Hemoglobin, Arterial 7.5 (L) 12.0 - 16.0 g/dL    POCT Anion Gap,  Arterial      Patient Temperature 37.0 degrees Celsius    FiO2 100 %   CBC   Result Value Ref Range    WBC 16.1 (H) 4.4 - 11.3 x10*3/uL    nRBC 0.0 0.0 - 0.0 /100 WBCs    RBC 2.43 (L) 4.00 - 5.20 x10*6/uL    Hemoglobin 7.8 (L) 12.0 - 16.0 g/dL    Hematocrit 21.4 (L) 36.0 - 46.0 %    MCV 88 80 - 100 fL    MCH 32.1 26.0 - 34.0 pg    MCHC 36.4 (H) 32.0 - 36.0 g/dL    RDW 20.2 (H) 11.5 - 14.5 %    Platelets 71 (L) 150 - 450 x10*3/uL   Hepatic Function Panel   Result Value Ref Range    Albumin 2.7 (L) 3.4 - 5.0 g/dL    Bilirubin, Total 2.4 (H) 0.0 - 1.2 mg/dL    Bilirubin, Direct 1.3 (H) 0.0 - 0.3 mg/dL    Alkaline Phosphatase 81 33 - 110 U/L    ALT 13 7 - 45 U/L    AST 44 (H) 9 - 39 U/L    Total Protein 5.5 (L) 6.4 - 8.2 g/dL   Renal Function Panel   Result Value Ref Range    Glucose 240 (H) 74 - 99 mg/dL    Sodium 128 (L) 136 - 145 mmol/L    Potassium 4.0 3.5 - 5.3 mmol/L    Chloride 97 (L) 98 - 107 mmol/L    Bicarbonate 17 (L) 21 - 32 mmol/L    Anion Gap 18 10 - 20 mmol/L    Urea Nitrogen 15 6 - 23 mg/dL    Creatinine 3.62 (H) 0.50 - 1.05 mg/dL    eGFR 15 (L) >60 mL/min/1.73m*2    Calcium 7.4 (L) 8.6 - 10.6 mg/dL    Phosphorus 5.1 (H) 2.5 - 4.9 mg/dL    Albumin 2.7 (L) 3.4 - 5.0 g/dL   Lactate   Result Value Ref Range    Lactate 5.5 (HH) 0.4 - 2.0 mmol/L   Magnesium   Result Value Ref Range    Magnesium 1.82 1.60 - 2.40 mg/dL   POCT GLUCOSE   Result Value Ref Range    POCT Glucose 258 (H) 74 - 99 mg/dL   POCT GLUCOSE   Result Value Ref Range    POCT Glucose 222 (H) 74 - 99 mg/dL   POCT GLUCOSE   Result Value Ref Range    POCT Glucose 189 (H) 74 - 99 mg/dL   POCT GLUCOSE   Result Value Ref Range    POCT Glucose 193 (H) 74 - 99 mg/dL   POCT GLUCOSE   Result Value Ref Range    POCT Glucose 229 (H) 74 - 99 mg/dL   POCT GLUCOSE   Result Value Ref Range    POCT Glucose 215 (H) 74 - 99 mg/dL   CBC and Auto Differential   Result Value Ref Range    WBC 21.5 (H) 4.4 - 11.3 x10*3/uL    nRBC 0.0 0.0 - 0.0 /100 WBCs    RBC 2.32  (L) 4.00 - 5.20 x10*6/uL    Hemoglobin 7.5 (L) 12.0 - 16.0 g/dL    Hematocrit 20.2 (L) 36.0 - 46.0 %    MCV 87 80 - 100 fL    MCH 32.3 26.0 - 34.0 pg    MCHC 37.1 (H) 32.0 - 36.0 g/dL    RDW 19.9 (H) 11.5 - 14.5 %    Platelets 59 (L) 150 - 450 x10*3/uL    Immature Granulocytes %, Automated 0.7 0.0 - 0.9 %    Immature Granulocytes Absolute, Automated 0.16 0.00 - 0.70 x10*3/uL   Hepatic function panel   Result Value Ref Range    Albumin 2.6 (L) 3.4 - 5.0 g/dL    Bilirubin, Total 2.6 (H) 0.0 - 1.2 mg/dL    Bilirubin, Direct 1.4 (H) 0.0 - 0.3 mg/dL    Alkaline Phosphatase 72 33 - 110 U/L    ALT 14 7 - 45 U/L    AST 51 (H) 9 - 39 U/L    Total Protein 5.4 (L) 6.4 - 8.2 g/dL   BUN   Result Value Ref Range    Urea Nitrogen 12 6 - 23 mg/dL   Creatinine, Serum   Result Value Ref Range    Creatinine 2.87 (H) 0.50 - 1.05 mg/dL    eGFR 20 (L) >60 mL/min/1.73m*2   Electrolyte panel   Result Value Ref Range    Sodium 128 (L) 136 - 145 mmol/L    Potassium 3.7 3.5 - 5.3 mmol/L    Chloride 97 (L) 98 - 107 mmol/L    Bicarbonate 17 (L) 21 - 32 mmol/L    Anion Gap 18 10 - 20 mmol/L   Calcium, ionized   Result Value Ref Range    POCT Calcium, Ionized 1.00 (L) 1.1 - 1.33 mmol/L   Phosphorus   Result Value Ref Range    Phosphorus 3.3 2.5 - 4.9 mg/dL   Renal Function Panel   Result Value Ref Range    Glucose 194 (H) 74 - 99 mg/dL    Sodium 127 (L) 136 - 145 mmol/L    Potassium 3.7 3.5 - 5.3 mmol/L    Chloride 98 98 - 107 mmol/L    Bicarbonate 16 (L) 21 - 32 mmol/L    Anion Gap 17 10 - 20 mmol/L    Urea Nitrogen 12 6 - 23 mg/dL    Creatinine 2.80 (H) 0.50 - 1.05 mg/dL    eGFR 21 (L) >60 mL/min/1.73m*2    Calcium 7.3 (L) 8.6 - 10.6 mg/dL    Phosphorus 3.4 2.5 - 4.9 mg/dL    Albumin 2.6 (L) 3.4 - 5.0 g/dL   Magnesium   Result Value Ref Range    Magnesium 1.80 1.60 - 2.40 mg/dL   Hemoglobin A1c   Result Value Ref Range    Hemoglobin A1C 4.8 see below %    Estimated Average Glucose 91 Not Established mg/dL   Lactate   Result Value Ref Range     Lactate 5.5 (HH) 0.4 - 2.0 mmol/L   Manual Differential   Result Value Ref Range    Neutrophils %, Manual 30.0 40.0 - 80.0 %    Lymphocytes %, Manual 15.0 13.0 - 44.0 %    Monocytes %, Manual 52.0 2.0 - 10.0 %    Eosinophils %, Manual 1.0 0.0 - 6.0 %    Basophils %, Manual 0.0 0.0 - 2.0 %    Metamyelocytes %, Manual 2.0 0.0 - 0.0 %    Seg Neutrophils Absolute, Manual 6.45 1.20 - 7.00 x10*3/uL    Lymphocytes Absolute, Manual 3.23 1.20 - 4.80 x10*3/uL    Monocytes Absolute, Manual 11.18 (H) 0.10 - 1.00 x10*3/uL    Eosinophils Absolute, Manual 0.22 0.00 - 0.70 x10*3/uL    Basophils Absolute, Manual 0.00 0.00 - 0.10 x10*3/uL    Metamyelocytes Absolute, Manual 0.43 0.00 - 0.00 x10*3/uL    Total Cells Counted 100     RBC Morphology See Below     Polychromasia Mild     Teardrop Cells Few    Blood Gas Arterial Full Panel   Result Value Ref Range    POCT pH, Arterial 7.32 (L) 7.38 - 7.42 pH    POCT pCO2, Arterial 30 (L) 38 - 42 mm Hg    POCT pO2, Arterial 118 (H) 85 - 95 mm Hg    POCT SO2, Arterial 99 94 - 100 %    POCT Oxy Hemoglobin, Arterial 98.2 (H) 94.0 - 98.0 %    POCT Hematocrit Calculated, Arterial 22.0 (L) 36.0 - 46.0 %    POCT Sodium, Arterial 125 (L) 136 - 145 mmol/L    POCT Potassium, Arterial 3.6 3.5 - 5.3 mmol/L    POCT Chloride, Arterial 97 (L) 98 - 107 mmol/L    POCT Ionized Calcium, Arterial 1.07 (L) 1.10 - 1.33 mmol/L    POCT Glucose, Arterial 195 (H) 74 - 99 mg/dL    POCT Lactate, Arterial 4.8 (HH) 0.4 - 2.0 mmol/L    POCT Base Excess, Arterial -9.6 (L) -2.0 - 3.0 mmol/L    POCT HCO3 Calculated, Arterial 15.5 (L) 22.0 - 26.0 mmol/L    POCT Hemoglobin, Arterial 7.4 (L) 12.0 - 16.0 g/dL    POCT Anion Gap, Arterial 16 10 - 25 mmo/L    Patient Temperature 37.0 degrees Celsius    FiO2 80 %   POCT GLUCOSE   Result Value Ref Range    POCT Glucose 210 (H) 74 - 99 mg/dL   POCT GLUCOSE   Result Value Ref Range    POCT Glucose 155 (H) 74 - 99 mg/dL   Blood Gas Arterial Full Panel   Result Value Ref Range    POCT pH,  Arterial 7.37 (L) 7.38 - 7.42 pH    POCT pCO2, Arterial 29 (L) 38 - 42 mm Hg    POCT pO2, Arterial 134 (H) 85 - 95 mm Hg    POCT SO2, Arterial 99 94 - 100 %    POCT Oxy Hemoglobin, Arterial 97.9 94.0 - 98.0 %    POCT Hematocrit Calculated, Arterial 23.0 (L) 36.0 - 46.0 %    POCT Sodium, Arterial 126 (L) 136 - 145 mmol/L    POCT Potassium, Arterial 3.8 3.5 - 5.3 mmol/L    POCT Chloride, Arterial 98 98 - 107 mmol/L    POCT Ionized Calcium, Arterial 1.06 (L) 1.10 - 1.33 mmol/L    POCT Glucose, Arterial 153 (H) 74 - 99 mg/dL    POCT Lactate, Arterial 4.3 (HH) 0.4 - 2.0 mmol/L    POCT Base Excess, Arterial -7.6 (L) -2.0 - 3.0 mmol/L    POCT HCO3 Calculated, Arterial 16.8 (L) 22.0 - 26.0 mmol/L    POCT Hemoglobin, Arterial 7.6 (L) 12.0 - 16.0 g/dL    POCT Anion Gap, Arterial 15 10 - 25 mmo/L    Patient Temperature 37.0 degrees Celsius    FiO2 80 %   POCT GLUCOSE   Result Value Ref Range    POCT Glucose 141 (H) 74 - 99 mg/dL   Fibrinogen   Result Value Ref Range    Fibrinogen 56 (LL) 200 - 400 mg/dL   POCT GLUCOSE   Result Value Ref Range    POCT Glucose 164 (H) 74 - 99 mg/dL   Blood Gas Arterial Full Panel   Result Value Ref Range    POCT pH, Arterial 7.36 (L) 7.38 - 7.42 pH    POCT pCO2, Arterial 32 (L) 38 - 42 mm Hg    POCT pO2, Arterial 91 85 - 95 mm Hg    POCT SO2, Arterial 99 94 - 100 %    POCT Oxy Hemoglobin, Arterial 97.2 94.0 - 98.0 %    POCT Hematocrit Calculated, Arterial 23.0 (L) 36.0 - 46.0 %    POCT Sodium, Arterial 127 (L) 136 - 145 mmol/L    POCT Potassium, Arterial 3.9 3.5 - 5.3 mmol/L    POCT Chloride, Arterial 98 98 - 107 mmol/L    POCT Ionized Calcium, Arterial 1.01 (L) 1.10 - 1.33 mmol/L    POCT Glucose, Arterial 149 (H) 74 - 99 mg/dL    POCT Lactate, Arterial 4.2 (HH) 0.4 - 2.0 mmol/L    POCT Base Excess, Arterial -6.7 (L) -2.0 - 3.0 mmol/L    POCT HCO3 Calculated, Arterial 18.1 (L) 22.0 - 26.0 mmol/L    POCT Hemoglobin, Arterial 7.5 (L) 12.0 - 16.0 g/dL    POCT Anion Gap, Arterial 15 10 - 25 mmo/L     Patient Temperature 37.0 degrees Celsius    FiO2 60 %   POCT GLUCOSE   Result Value Ref Range    POCT Glucose 186 (H) 74 - 99 mg/dL     CT chest abdomen pelvis wo IV contrast    Result Date: 4/10/2024  Interpreted By:  Maxime Wlikes and Fu Tianyuan STUDY: CT CHEST ABDOMEN PELVIS WO CONTRAST;  4/9/2024 2:13 pm   INDICATION: Signs/Symptoms:Sepsis. Presenting with altered mental status.   COMPARISON: None.   ACCESSION NUMBER(S): [id number]   ORDERING CLINICIAN: BESS SEGOVIA   TECHNIQUE: CT of the chest, abdomen and pelvis was performed. Contiguous axial images were obtained at 3 mm slice thickness through the chest, abdomen and pelvis. Coronal and sagittal reconstructions at 3 mm slice thickness were performed. No intravenous contrast was administered.   FINDINGS: Please note that the study is limited without intravenous contrast.   CHEST:   LUNG/PLEURA/LARGE AIRWAYS: Endotracheal tube in place with the tip terminating at the level of the faby/right mainstem bronchus. Mild secretions in the distal trachea.   There are extensive confluent and patchy consolidations and ground-glass opacities throughout the bilateral lungs consistent with multifocal pneumonia. Small bilateral pleural effusions, right-greater-than-left. No pneumothorax.   VESSELS: Aorta and pulmonary arteries are normal caliber.  No significant atherosclerotic changes of the thoracic aorta. Mild coronary artery calcifications are present.   HEART: The heart is normal in size. Small pericardial effusion.   MEDIASTINUM AND CHARIS: No mediastinal, hilar, or axillary lymphadenopathy. The esophagus is within normal limits.   CHEST WALL AND LOWER NECK: Severe diffuse chest wall edema is present. The visualized thyroid gland appears within normal limits.   ABDOMEN:   LIVER: Normal size and smooth contour. Severe diffuse hepatic steatosis. No focal parenchymal abnormalities are seen.   BILE DUCTS: The intrahepatic and extrahepatic bile ducts are  nondilated.   GALLBLADDER: Cholelithiasis. The gallbladder is nondistended.   PANCREAS: The pancreas is unremarkable without evidence of ductal dilation or masses.   SPLEEN: The spleen is normal in size and without evidence of focal lesions.   ADRENAL GLANDS: Within normal limits.   KIDNEYS AND URETERS: Bilateral kidneys are mildly atrophic. Punctate 0.3 cm calculus at the right upper pole. No hydronephrosis.   PELVIS:   BLADDER: The bladder is decompressed with Tadeo catheter in place.   REPRODUCTIVE ORGANS: The uterus is present. No suspicious pelvic masses.   BOWEL: The stomach is unremarkable.  The small and large bowel are normal in caliber and demonstrate no wall thickening.  The appendix is not definitively visualized.   VESSELS: There is no aneurysmal dilatation of the abdominal aorta. The IVC is within normal limits. Mild atherosclerotic changes of the abdominal aorta and its branch vessels.   PERITONEUM/RETROPERITONEUM/LYMPH NODES: There is large volume ascites. No loculated fluid collections or intraperitoneal free air. No abdominopelvic lymphadenopathy is present.   ABDOMINAL WALL: Severe diffuse abdominal wall anasarca.   BONES: No acute osseous abnormality or suspicious osseous lesions.       Chest 1. Endotracheal tube in place with the tip terminating at the level of the origin of the right mainstem bronchus. 2. Extensive consolidation/ground-glass opacities throughout both lungs consistent with multifocal pneumonia. 3. Small bilateral pleural effusions. 4. Small pericardial effusion.   Abdomen-Pelvis 1. Large volume ascites. 2. Severe diffuse hepatic steatosis. 3. Cholelithiasis. 4. Severe body wall anasarca.   I personally reviewed the images/study and I agree with the findings as stated by resident physician Rosales Vernon MD. This study was interpreted at [facility], [place], [place].   The above information was relayed to Ordering provider Perri Watts by Rosales  MD Saulo via epic haiku at 2:30 p.m. on 04/09/2024 with readback verification.   MACRO: None   Signed by: Maxime Wilkes 4/10/2024 12:20 PM Dictation workstation:   [id number]    XR chest 1 view    Result Date: 4/10/2024  Interpreted By:  Clary Mcmillan, STUDY: XR CHEST 1 VIEW; 4/10/2024 7:49 am   INDICATION: Signs/Symptoms:dyspnea.   COMPARISON: Radiograph dated 04/09/2024   ACCESSION NUMBER(S): [id number]   ORDERING CLINICIAN: VIKAS BARRY   FINDINGS: ET tube is terminating 3.9 cm from the faby. Enteric tube is in place with the tip outside the field of view. Right IJ central venous catheter is projecting over right atrium.   The cardiac silhouette size is within normal limits.   Multifocal consolidation in bilateral lungs with slight improvement in the aeration of the right upper lung. Small left pleural effusion. No sizable pneumothorax.   No acute osseous change.       1. Multifocal consolidative opacities with increased interstitial markings. Findings may be due to severe edema or multifocal infection. Aeration of the right upper lung is improved compared to prior study. 2. Small left pleural effusion. 3. Medical devices as described       Signed by: Clary Green 4/10/2024 8:09 AM Dictation workstation:   [id number]    US liver with doppler    Result Date: 4/10/2024  Interpreted By:  Robert Lawler,  and Katherine Nelson STUDY: US LIVER WITH DOPPLER;  4/10/2024 4:12 am   INDICATION: Signs/Symptoms:SBP, alcohol cirrhosis decompensated; with dopplers.   COMPARISON: CT cap 04/09/2024   ACCESSION NUMBER(S): [id number]   ORDERING CLINICIAN: FERNANDO GUIDRY   TECHNIQUE: Multiple images of the right upper quadrant were obtained.  Gray scale, color Doppler and spectral Doppler waveform analysis was performed.   FINDINGS: Examination is significantly limited secondary to patient body habitus and extensive ascites. Within the limitation:   LIVER: The liver is small, heterogeneous, with nodular  contour measuring 13.9 cm in the craniocaudal dimension.   GALLBLADDER: Cholelithiasis is seen without gallbladder dilatation, wall thickening, or pericholecystic fluid. Sonographic Jackson's sign is negative.   BILIARY SYSTEM: No significant intrahepatic biliary ductal dilatation. The CBD was not seen.   DOPPLER EVALUATION:   HEPATIC ARTERIES: Poorly visualized.   PORTAL VEIN: Limited evaluation of the portal vein system and branches. The main portal vein is patent measures 0.8 cm with peak velocity of 19 cm/s.   HEPATIC VEIN: The right, middle and left hepatic veins are patent and demonstrate triphasic antegrade flow. IVC appears also patent.   PANCREAS: The pancreas is poorly visualized due to overlying bowel gas.   RIGHT KIDNEY: The right kidney measures 7.9 cm in length. No overt hydronephrosis or renal calculi are seen.   SPLEEN: Poorly visualized.   PERITONEUM AND RETROPERITONEUM: Large volume ascites is seen.       Significantly limited examination.   1. Hepatic cirrhosis with large volume ascites. Limited evaluation of the portal venous system. 2. Cholelithiasis without acute cholecystitis.     I personally reviewed the images/study and I agree with Dr. Omar Murphy and the findings as stated.   MACRO: None   Signed by: Robert Lawler 4/10/2024 5:59 AM Dictation workstation:   [id number]    ECG 12 lead    Result Date: 4/10/2024  Sinus tachycardia with Premature atrial complexes with Aberrant conduction Low voltage QRS Nonspecific T wave abnormality Abnormal ECG No previous ECGs available See ED provider note for full interpretation and clinical correlation Confirmed by Cesar Bentley ([id number]) on 4/10/2024 2:36:33 AM    XR abdomen 1 view    Result Date: 4/9/2024  Interpreted By:  Omer Tsai, STUDY: XR ABDOMEN 1 VIEW;  4/9/2024 6:19 pm   INDICATION: Signs/Symptoms:OG placement.   COMPARISON: CT chest abdomen pelvis 04/09/2024.   ACCESSION NUMBER(S): [id number]   ORDERING CLINICIAN: JAIME HE    FINDINGS: Enteric tube courses midline below the level of the diaphragm with the tip projecting over the expected location of the gastric antrum.   Nonobstructive bowel gas pattern. There is centralization of visualized loops of large bowel consistent with large volume ascites and diffuse anasarca on CT from 04/09/2024. Limited evaluation of pneumoperitoneum on supine imaging, however no gross evidence of free air is noted.   Unchanged patchy alveolar opacities throughout the bilateral lower lungs compared to prior examination with small left pleural effusion.   Osseous structures demonstrate no acute bony changes.       1.  Enteric tube with the distal tip projecting over the expected location of the gastric antrum. 2. Nonobstructive bowel gas pattern with centralization of visualized loops of large bowel consistent with previously observed large volume ascites and diffuse anasarca. 3. Unchanged patchy alveolar opacities and small left-sided pleural effusion better characterized on same day x-ray of the chest from 04/09/2024.   I personally reviewed the images/study and I agree with the findings as stated by resident Omer Tsai. This study was interpreted at [place], [place].   MACRO: None     Dictation workstation:   [id number]    XR chest 1 view    Result Date: 4/9/2024  Interpreted By:  Kosmas, Servando,  and Matamoras Lea STUDY: XR CHEST 1 VIEW;  4/9/2024 3:35 pm   INDICATION: Signs/Symptoms:central line placement, RIJ.   COMPARISON: Same day chest radiograph time stamped 1:34 p.m.   ACCESSION NUMBER(S): [id number]   ORDERING CLINICIAN: RYAN GUERRERO   FINDINGS: Single AP view of the chest was provided.   Interval retraction of an endotracheal tube with tip now terminating 3.2 cm above the faby, in satisfactory positioning. Interval placement of a right internal jugular approach central venous catheter with tip overlying the superior cavoatrial  junction.   CARDIOMEDIASTINAL SILHOUETTE: Similar obscuration of much of the cardiac border due to overlying pulmonary opacities.   LUNGS: Similar dense patchy alveolar opacities throughout the bilateral lungs, primarily in the right upper lobe. Probable small left pleural effusion. No pneumothorax.   ABDOMEN: Hyperdense material overlying the stomach. Mild dilation of multiple loops of bowel in the upper abdomen.   BONES: No acute osseous changes.       1. Satisfactory positioning of right internal jugular approach central venous catheter. 2. Unchanged appearance of patchy pulmonary opacities throughout the bilateral lungs, particularly in the right upper lobe, with a probable small left pleural effusion. 3. Hyperdense material overlying the stomach with multiple dilated loops of bowel in the upper abdomen. Correlate with same day CT.   I personally reviewed the image(s)/study and resident interpretation as stated by Dr. Lea Mccray MD. I agree with the findings as stated. This study was interpreted at [facility], [place], [place].   MACRO: None   Signed by: Servando Sosa 4/9/2024 4:09 PM Dictation workstation:   [id number]    CT head wo IV contrast    Result Date: 4/9/2024  Interpreted By:  Bob Mitchell, STUDY: CT HEAD WO IV CONTRAST;  4/9/2024 2:08 pm   INDICATION: Signs/Symptoms:AMS.   COMPARISON: None.   ACCESSION NUMBER(S): [id number]   ORDERING CLINICIAN: BRITT JOHNSON   TECHNIQUE: Noncontrast axial CT scan of head was performed.   FINDINGS: Parenchyma: There is no intracranial hemorrhage. The grey-white differentiation is intact. There is no mass effect or midline shift. Patchy nonspecific white matter hypodensities involving the deep white matter of the bilateral cerebral hemispheres.   CSF Spaces: The ventricles, sulci and basal cisterns are within normal limits for age.   Extra-Axial Fluid: There is no extraaxial fluid collection.   Calvarium: The calvarium  is unremarkable.   Paranasal sinuses: Visualized paranasal sinuses are clear.   Mastoids: Clear.   Orbits: Normal.   Soft tissues: Unremarkable.       No acute intracranial hemorrhage, mass effect, or CT apparent acute infarct.   Mild nonspecific patchy white matter hypodensity involving the bilateral cerebral hemispheres that may reflect age advanced chronic small vessel ischemic disease. MRI may be helpful for further characterization as clinically warranted.   MACRO: None   Signed by: Bob Mitchell 4/9/2024 2:30 PM Dictation workstation:   [id number]    XR chest 1 view    Result Date: 4/9/2024  Interpreted By:  Kosmas, Servando,  and Shazia Lea STUDY: XR CHEST 1 VIEW;  4/9/2024 1:43 pm   INDICATION: Signs/Symptoms:Intubation.   COMPARISON: Chest radiograph 04/09/2024 time stamped 11:53 a.m..   ACCESSION NUMBER(S): [id number]   ORDERING CLINICIAN: BESS SEGOVIA   FINDINGS: Single AP supine radiograph of the chest was provided.   Interval intubation with endotracheal tube tip terminating 0.6 cm above the faby.   CARDIOMEDIASTINAL SILHOUETTE: Much of the cardiomediastinal silhouette is obscured by adjacent pulmonary opacities.   LUNGS: Increasing patchy alveolar pulmonary opacities throughout the bilateral lungs, particularly worsening in the right upper lung field and adjacent to the right heart border. Probable small left pleural effusion. No pneumothorax.   ABDOMEN: No remarkable upper abdominal findings.   BONES: No acute osseous changes.       1. Interval intubation with endotracheal tube 0.6 cm above the faby, recommend slight retraction. 2. Worsening patchy alveolar opacities throughout the bilateral lungs, particularly in the right upper and middle lobes suspicious for multifocal pneumonia likely with a component of pulmonary edema.   I personally reviewed the image(s)/study and resident interpretation as stated by Dr. Lea Mccray MD. I agree with the findings as stated. This study  was interpreted at [facility], [place], [place].   MACRO: None   Signed by: Servando Sosa 4/9/2024 2:19 PM Dictation workstation:   [id number]    XR chest 1 view    Result Date: 4/9/2024  Interpreted By:  Servando Sosa, STUDY: XR CHEST 1 VIEW;  4/9/2024 12:02 pm   INDICATION: Signs/Symptoms:Hypoxia.   COMPARISON: None.   ACCESSION NUMBER(S): [id number]   ORDERING CLINICIAN: BESS SEGOVIA   FINDINGS:         CARDIOMEDIASTINAL SILHOUETTE: Cardiomediastinal silhouette is normal in size and configuration.   LUNGS: There is extensive multifocal dense airspace disease worse in the right upper lung and the bases bilaterally.   ABDOMEN: No remarkable upper abdominal findings.   BONES: No acute osseous changes.       Extensive multifocal dense airspace disease compatible with multifocal pneumonia. CT or radiographic follow-up to resolution is advised   MACRO: None   Signed by: Servando Sosa 4/9/2024 12:49 PM Dictation workstation:   [id number]     This patient has a central line   Reason for the central line remaining today? Dialysis/Hemapheresis, Hemodynamic monitoring, and Parenteral medication      This patient is intubated   Reason for patient to remain intubated today? they have continued cardiopulmonary lability/instability and they have inadequate gas-exchange without positive pressure        Malnutrition Diagnosis Status: New  Malnutrition Diagnosis: Moderate malnutrition related to chronic disease or condition  As Evidenced by: noted moderate to severe fat and muscle wasting on physical exam; suspect pt was not eating adequately PTA in light of EtOH abuse based on admit records from [facility]  I agree with the dietitian's malnutrition diagnosis.      Assessment/Plan   Principal Problem:    Sayda Beard is a 43-year-old female with past medical history of chronic pancreatitis per chart review, alcohol induced hepatic cirrhosis with prior upper GI bleed and hepatic  encephalopathy who presented to the emergency department for lethargy and altered mental status.  While in the emergency department, patient was hypotensive and mental status worsened necessitating emergent intubation and initiation of vasopressors.  Currently suspect septic shock in setting of multifocal pneumonia seen on CT chest on admission, as well as some component of hemorrhagic shock given hemoglobin of 6.3 on admission.    Updates 4/11:   - run CVVH net even (removing 100mL/hr to compensate for intakes)  - concentrated levophed formulation  - given oxygenation improvement, pt now on 50% FiO2; plan to wean off inhaled epoprostenol today, will wean vent settings if she is stable off epoprostenol  - stop insulin gtt  - Start sliding scale insulin  - plan to decrease PPI to once daily on 4/15  - stop azithromycin now w/ 3 days treatment   - dulcolax suppository in addition to lactulose  - RASS goal -1 to -2  - LDH elevated, fibrinogen low, haptoglobin low; DIC vs. Acute on chronic liver failure; will transfuse cryo and fibrinogen in setting of Hb drop (currently stable since transfusion)     NEURO  # Metabolic encephalopathy  #Hepatic encephalopathy  :: Recent admission at [facility] for hemorrhagic shock in the setting of hematemesis; hemoglobin was 3.2 on admission with lactate of 13; EGD during that admission revealed grade 1 gastric varices which were injected   :: Ammonia 115 on admission  :: Possible some component of encephalopathy from metabolic derangements from sepsis  :: TSH 17.27, free T4 0.32 on admission  - 200mcg synthroid IV in ED   - 100mg hydrocortisone in ED   - Intubated in ED for AMS and airway protection  Plan:   -Rifaximin 550 mg twice daily  -Lactulose 20 g every 2 hours until bowel movements achieved; will then titrate to 3-4 bowel movements per day  -Antibiotics as under ID for sepsis  - Shock management as under cardiovascular  - thyroid as under endocrine  - acidosis as under  pulm    #Sedation  - sedated while on ventilator and for ARDS  - Will change goal to RASS -1 to -2, if patient starts struggling with ventilation, will increase sedation back to -3 to -4     CARDIOVASCULAR  #Shock 2/2 septic from pneumonia and SBP  :: CT chest abdomen pelvis 4/9 showing multifocal pneumonia  :: diagnostic paracentesis in ED w/ 2,072,000 RBC, 17,078 WBC w. 76% PMNs; 4691 PMNs after correction for RBCs  - Hypotensive 70s/50s in ED, now on pressors   - Hb 6.3 on admission, previously 3.2 on admission at [facility], baseline ~11; s/p 1 unit PRBCs  - Strep and legionella antigens negative  - 75g 25% albumin, 2L LR on 4/9; 75g albumin 25% on 4/10  Plan:   - blood cultures x2, urinalysis w/ reflex culture, ascitic fluid as above  - Vanc (4/9-*)  - Stop zosyn (4/9-4/10) given Pseudomonas on respiratory culture and recent hospitalization  - Continue meropenem 2g IV once followed by 2g q12 hrs given initiation of CVVH  - s/p tobramycin IV x1 on 4/10 for double pseudomonal coverage given critical illness   - s/p Azithromycin 500mg every day 4/9-4/11  - Currently on levophed and vasopressin, MAP goal >65  - Hydrocortisone 50mg q6hrs     PULMONARY  #Acute hypoxic respiratory failure 2/2 multifocal pneumonia  #Moderate ARDS  :: CT chest on admission showing bilateral patchy infiltrates consistent with multifocal pneumonia  :: Hypoxic on presentation requiring nasal cannula, subsequently intubated for AMS and airway protection   - Pt became hypoxic on 100% FiO2 (pO2 54) w/ P/F 54 in setting of sepsis and multifocal pneumonia  - Most recently P/F ratio ~150 (pO2 91, FiO2 60%), improved w/ APRV and inhaled epoprostenol   - strep and legionella antigens negative   - MRSA nares positive  Plan:   - Will trial ventilation with APRV for oxygenation; SpO2 improved, will follow with serial ABGs; plan to wean settings as tolerated   - Inhaled epoprostenol; aim to wean off today   - Abx per ID  - resp culture growing 3+ Psa;  susceptibilities pending   - blood cultures x2   - Daily CXR  - Will hold off on proning and paralyzing patient given hemodynamic in-stability   - Will hold off on dexamethasone for ARDS as currently giving precedent to hydrocortisone for septic shock     #mixed anion gap metabolic acidosis and non-gap metabolic acidosis  - continued mixed gap likely 2/2 lactic acidosis/uremia and renal failure  Plan:   - Infection management as above  - CVVH as per nephrology   - ABG prn to monitor acidosis and oxygenation     GI  # Acute decompensated liver cirrhosis secondary to ethanol  #Hepatic encephalopathy  #Spontaneous bacterial peritonitis  - hx of HE, recently admitted  - unclear if compliant w/ lactulose at home  - ammonia elevated on admission  - RUQ ultrasound without actionable findings; hold on therapeutic paracentesis given KEITH and hypotension  Plan:   - Abx for SBP as above  - s/p 75g 25% albumin 4/9 and 4/10, will hold on further albumin given concern for ARDS and concern for volume overload   - Lactulose 20g q2hr until bowel movements, then titrate for 3-4BM/day  - Rifaximin 550mg BID  - Hepatology consult, pt oxygenation too poor at this time for scoping, appreciate recommendations  - 80mg IV PPI in ED; decrease PPI to once daily   - Nutrition consult, currently on tube feeds   - Dulcolax suppository     #EtOH use disorder  - Per tavo hannah since 3/9  Plan  - High dose thiamine 500mg IV  - Folic acid 1mg daily   -If extubated <72 hrs, recommend CIWA; currently on propofol      RENAL/  #KEITH stage 3  - creatinine 4.8 from baseline ~1 prior to last admission  - Likely multifactorial in setting of septic shock, possible HRS  Plan:   - Nephrology consulted, appreciate recommendations, planning for CVVH given volume overload and acidosis  - CVVH since 4/10  - Tadeo for accurate ins and outs  - Urine lytes indicative of pre-renal etiology   - CT scan without signs of hydronephrosis  - Strict I and o   - PRN ABGs  to assess acidosis and hyperK   - Renally dose medications  - Avoid nephrotoxins      #HAGMA and NAGMA  -2/2 Lactic acidosis and renal failure  - management under pulmonary      INFECTIOUS DISEASE  #SBP  #Multifocal pneumonia  - SBP on diagnostic paracentesis  - Pneumonia on CT scan w/ AHRF  - Strep and legionella antigens negative   Plan:   - Vancomycin (4/9-*)  - Zosyn discontinued (4/9-4/10)  - Continue meropenem 2g q12 hrs once on CVVH; broadening given known pseudomonas and recent hospitalization; will narrow based on culture susceptibilities   - Given concern for resistant pseudomonas, s/p treatment with tobramycin on 4/10  - Azithromycin 500mg (4/9-4/11)  - MRSA nares positive  - Ascites culture pending   - Respiratory culture growing abundant pseudomonas   - Blood cultures x2      HEME/ONC  #Acute on chronic macrocytic anemia  - Baseline Hb ~11 per [facility] records, 3.2 on recent admission there, 6.3 on admission here  - Family denies hematemesis prior to this admission  Plan:   - s/p 1 unit PRBCs  - Iron, tibc, ferritin, b12, folate   - Thyroid as below   - Transfuse Hb<7    #DIC  - patient baseline with coagulopathy  - , Fibrinogen 56, Haptoglobin <10  - Peripheral smear, factor 8 level pending  Plan:   - follow up factor 8  - Transfuse w/ cryo and fibrinogen for bleeding  - If requiring transfusions, will check CT A/P given abdominal distention and recent paracentesis     #Thrombocytopenia  - Likely secondary Cirrhosis, septic shock, DIC  Plan:  - Transfuse Plt <10 or <50 and bleeding  - DIC labs w/ fibrinogen, haptoglobin, LDH, peripheral smear      #Coagulopathy   - likely secondary to cirrhosis, septic shock, DIC  Plan:   - vitamin K 10mg IV x3 days (4/9-4/11)  - continue to monitor      MSK/RHEUM  #R inguinal fold laceration  - Wound consult      ENDOCRINE  #Hypothyroidism  - Unclear if chronic hypothyroidism, no TSH in EMR  - TSH 12.27 on admission, free T4 0.32  - s/p synthroid 200mcg IV in ED and  hydrocortisone 100mg IV  Plan:   - Continue stress dose steroids as under CV  - Endocrinology consulted, appreciate recommendations:   - Levothyroxine 88mcg daily   - hyperglycemia resolved, stop insulin gtt, start SSI #2    PSYCH  #MDD  - Holding home escitalopram 5mg daily      F: PRN, restricting fluid 2/2 ARDS  E: PRN  N: Two ysabel   A: RIJ CVC (4/9), L radial A line (4/9), 2 20g IV, krishnamurthy (4/9), LIJ trialysis line (4/10)  O2: APRV FiO2 100%, Inspiratory pressure 40, expiratory time 0.4 RR 18   Abx: Vanc, zosyn, azithro (4/9-*)  Gtt: levo 0.28, vaso 0.03, propofol 25mcg/kg/min, fentanyl 75mcg/hr, epi ordered but not running     NOK: Daughter Sada [phone]; [place] [phone]  Code status: DNR, ok with intubation         Woodrow Cage MD

## 2024-04-11 NOTE — ED PROCEDURE NOTE
Procedure  Critical Care    Performed by: Jayden Rojas DO  Authorized by: Jayden Rojas DO    Critical care provider statement:     Critical care time (minutes):  45    Critical care was necessary to treat or prevent imminent or life-threatening deterioration of the following conditions:  Sepsis and hepatic failure    Critical care was time spent personally by me on the following activities:  Blood draw for specimens, discussions with consultants, pulse oximetry, re-evaluation of patient's condition, review of old charts, ordering and review of radiographic studies, ordering and review of laboratory studies and obtaining history from patient or surrogate               Jayden Rojas DO  04/11/24 1044     Mirvaso Pregnancy And Lactation Text: This medication has not been assigned a Pregnancy Risk Category. It is unknown if the medication is excreted in breast milk.

## 2024-04-11 NOTE — PROGRESS NOTES
"[facility]  [facility] [place]  CONSULT FOLLOW-UP     Reason For Consult    etoh cirrhosis, hx varices (no banding), admitted for HE and septic shock 2/2 pneumonia +SBP    History Of Present Illness  Vera Beard is a 43 y.o. female with a past medical history of chronic pancreatitis, alcohol induced hepatic cirrhosis with prior upper GI bleed (no banding) and hepatic encephalopathy (hx intermittently refusing lactulose) admitted on 4/9/2024 with lethargy and AMS as well as ED reports of hematemesis in ED. Hepatology is consulted for above. Pt remains intubated and sedated and triple pressors, on broad spectrum abx for pseudomonas pneumonia + SBP. Nephro is following for oliguric KEITH. Recent admission to [facility] MICU for hemorrhagic shock s/p MTP 2/29-3/9/24, course c/b PEA arrest w/ ROSC after 2 rounds and pt left AMA to home.   SUBJECTIVE     -Nephro started HD using CVVH. No urine made yesterday, CVVH fluid removal 150cc.   -No fevers, in fact hypothermic, now on isidro hugger. WBC uptrending in setting of steroids and critical illness. Uptrending lactate w/ acidosis.   -Remains intubated, sedated, on broad spectrum abx w/ vanc, now merrem and dual pressors. Vent settings now being weaned with epo, fi02 60%.  -Ethics consulted for NOK, listed to be daughter who is surrogate decision maker and has made pt DNR.   -Small loose brown BM 4/10 6pm  -Moderate amount of bloody oozing around mouth and ETT    EXAM     Last Recorded Vitals  Blood pressure 100/60, pulse 66, temperature 34.1 °C (93.4 °F), temperature source Esophageal, resp. rate 18, height 1.651 m (5' 5\"), weight 71.9 kg (158 lb 8.2 oz), SpO2 95%.      Intake/Output Summary (Last 24 hours) at 4/11/2024 0844  Last data filed at 4/11/2024 0843  Gross per 24 hour   Intake 4721.25 ml   Output 151 ml   Net 4570.25 ml     Physical Exam  GENERAL: Intubated, sedated. Acutely ill, appears older than stated age  EYES: L conjunctival " hemorrhage  HENT: Multiple broken teeth, + scleral icterus. oozing blood around mouth and catheter/IV sites  LUNGS: Diffuse rhonchi anteriorly, mechanical breath sounds  CV: +systolic  murmur. Not tachycardic  ABDOMEN: hypoactive bowel sounds, moderately distended, + fluid wave  -EXTREMITIES: minimal pedal edema  SKIN: Extremities warm.  NEUROLOGIC: Intubated, sedated.   OBJECTIVE                                                                              Medications             Current Facility-Administered Medications:     azithromycin (Zithromax) tablet 500 mg, 500 mg, oral, q24h TEZ, Sarah Araujo MD, 500 mg at 04/10/24 2131    dextrose 50 % injection 12.5 g, 12.5 g, intravenous, q15 min PRN, Woodrow Cage MD    dextrose 50 % injection 25 g, 25 g, intravenous, q15 min PRN, Woodrow Cage MD    EPINEPHrine (Adrenalin) 4 mg in dextrose 5% 250 mL (16 mcg/mL) infusion (premix), 0-2 mcg/kg/min, intravenous, Continuous, Sarah Araujo MD    epoprostenol (Veletri) 3 mg/100 mL inhalation, 0.01 mcg/kg/min (Ideal), inhalation, Continuous, Terrie Guerrero MD, Last Rate: 1.14 mL/hr at 04/11/24 0534, 0.01 mcg/kg/min at 04/11/24 0534    fentanyl (Sublimaze) 1000 mcg in sodium chloride 0.9% 100 mL (10 mcg/mL) infusion (premix),  mcg/hr, intravenous, Continuous, Woodrow Cage MD, Last Rate: 7.5 mL/hr at 04/11/24 0500, 75 mcg/hr at 04/11/24 0500    fentaNYL PF (Sublimaze) injection 50 mcg, 50 mcg, intravenous, q2h PRN, Jayden Rojas DO    folic acid (Folvite) tablet 1 mg, 1 mg, oral, Daily, Sarah Araujo MD, 1 mg at 04/11/24 0843    glucagon (Glucagen) injection 1 mg, 1 mg, intramuscular, q15 min PRN, Woodrow Cage MD    hydrocortisone sod succ (PF) (Solu-CORTEF) injection 50 mg, 50 mg, intravenous, q6h, Sarah Araujo MD, 50 mg at 04/11/24 0510    insulin lispro (HumaLOG) injection 0-5 Units, 0-5 Units, subcutaneous, q4h, Biju Ford MD    insulin regular (HumuLIN, NovoLIN) bolus from bag 2-6  Units, 2-6 Units, intravenous, PRN, Woodrow Cage MD    ketamine (Ketalar) injection, , , Code/trauma/sedation med, Jayden Rojas DO, 100 mg at 04/09/24 1309    lactulose 20 gram/30 mL oral solution 20 g, 20 g, oral, q2h, Sarah Araujo MD, 20 g at 04/11/24 0843    levothyroxine (Synthroid, Levoxyl) tablet 88 mcg, 88 mcg, oral, Daily, Tiffany Walters MD, 88 mcg at 04/11/24 0511    magnesium sulfate in D5W IV 1 g, 1 g, intravenous, Once, Woodrow Cage MD    meropenem (Merrem) 2 g in sodium chloride 0.9 % 100 mL IV, 2 g, intravenous, q12h, Woodrow Cage MD, Stopped at 04/11/24 0300    norepinephrine (Levophed) 8 mg in dextrose 5% 250 mL (0.032 mg/mL) infusion (premix), 0-3 mcg/kg/min, intravenous, Continuous, Perri Watts MD, Last Rate: 40 mL/hr at 04/11/24 0500, 0.267 mcg/kg/min at 04/11/24 0500    oxygen (O2) therapy, , inhalation, Continuous PRN, Terrie Guerrero MD, Rate Verify at 04/11/24 0700    pantoprazole (ProtoNix) injection 40 mg, 40 mg, intravenous, BID, Woodrow Cage MD, 40 mg at 04/11/24 0843    PrismaSol 4/2.5 CRRT solution, 2,100 mL/hr, CRRT, Continuous, Myles Lambert MD, Last Rate: 2,100 mL/hr at 04/10/24 1430, 2,100 mL/hr at 04/10/24 1430    propofol (Diprivan) infusion, 5-50 mcg/kg/min, intravenous, Continuous, Jayden Rojas DO, Last Rate: 12 mL/hr at 04/11/24 0400, 25 mcg/kg/min at 04/11/24 0400    rifAXIMin (Xifaxan) tablet 550 mg, 550 mg, oral, q12h TEZ, Sarah Araujo MD, 550 mg at 04/11/24 0843    rocuronium (ZeMuron) injection, , intravenous, Code/trauma/sedation med, Jayden Rojas DO, 80 mg at 04/09/24 1311    thiamine (Vitamin B1) 500 mg in dextrose 5 % in water (D5W) 100 mL IV, 500 mg, intravenous, q8h TEZ, Perri Watts MD, Stopped at 04/11/24 0700    vancomycin (Vancocin) pharmacy to dose - pharmacy monitoring, , miscellaneous, Daily PRN, Sarah Araujo MD    vancomycin in dextrose 5 % (Vancocin) IVPB 750 mg, 750 mg, intravenous, q12h, Sarah BRAN  MD Van, Stopped at 04/11/24 0928    vasopressin (Vasostrict) 0.2 unit/mL infusion, 0.03 Units/min, intravenous, Continuous, Sarah Araujo MD, Last Rate: 9 mL/hr at 04/11/24 0400, 0.03 Units/min at 04/11/24 0400                                                                            Labs     Results for orders placed or performed during the hospital encounter of 04/09/24 (from the past 24 hour(s))   BLOOD GAS ARTERIAL FULL PANEL   Result Value Ref Range    POCT pH, Arterial 7.20 (LL) 7.38 - 7.42 pH    POCT pCO2, Arterial 26 (L) 38 - 42 mm Hg    POCT pO2, Arterial 90 85 - 95 mm Hg    POCT SO2, Arterial 97 94 - 100 %    POCT Oxy Hemoglobin, Arterial 96.0 94.0 - 98.0 %    POCT Hematocrit Calculated, Arterial 28.0 (L) 36.0 - 46.0 %    POCT Sodium, Arterial 123 (L) 136 - 145 mmol/L    POCT Potassium, Arterial 5.4 (H) 3.5 - 5.3 mmol/L    POCT Chloride, Arterial 99 98 - 107 mmol/L    POCT Ionized Calcium, Arterial 1.07 (L) 1.10 - 1.33 mmol/L    POCT Glucose, Arterial 250 (H) 74 - 99 mg/dL    POCT Lactate, Arterial 4.0 (HH) 0.4 - 2.0 mmol/L    POCT Base Excess, Arterial -16.4 (L) -2.0 - 3.0 mmol/L    POCT HCO3 Calculated, Arterial 10.2 (L) 22.0 - 26.0 mmol/L    POCT Hemoglobin, Arterial 9.2 (L) 12.0 - 16.0 g/dL    POCT Anion Gap, Arterial 19 10 - 25 mmo/L    Patient Temperature 37.0 degrees Celsius    FiO2 100 %   Blood Gas Arterial Full Panel   Result Value Ref Range    POCT pH, Arterial 7.24 (LL) 7.38 - 7.42 pH    POCT pCO2, Arterial 29 (L) 38 - 42 mm Hg    POCT pO2, Arterial 67 (L) 85 - 95 mm Hg    POCT SO2, Arterial 92 (L) 94 - 100 %    POCT Oxy Hemoglobin, Arterial 90.8 (L) 94.0 - 98.0 %    POCT Hematocrit Calculated, Arterial 26.0 (L) 36.0 - 46.0 %    POCT Sodium, Arterial 124 (L) 136 - 145 mmol/L    POCT Potassium, Arterial 4.5 3.5 - 5.3 mmol/L    POCT Chloride, Arterial 99 98 - 107 mmol/L    POCT Ionized Calcium, Arterial 1.06 (L) 1.10 - 1.33 mmol/L    POCT Glucose, Arterial 271 (H) 74 - 99 mg/dL    POCT  Lactate, Arterial 4.4 (HH) 0.4 - 2.0 mmol/L    POCT Base Excess, Arterial -13.7 (L) -2.0 - 3.0 mmol/L    POCT HCO3 Calculated, Arterial 12.4 (L) 22.0 - 26.0 mmol/L    POCT Hemoglobin, Arterial 8.8 (L) 12.0 - 16.0 g/dL    POCT Anion Gap, Arterial 17 10 - 25 mmo/L    Patient Temperature 37.0 degrees Celsius    FiO2 80 %   POCT GLUCOSE   Result Value Ref Range    POCT Glucose 293 (H) 74 - 99 mg/dL   POCT GLUCOSE   Result Value Ref Range    POCT Glucose 303 (H) 74 - 99 mg/dL   Vancomycin   Result Value Ref Range    Vancomycin 9.9 5.0 - 20.0 ug/mL   Blood Gas Arterial Full Panel   Result Value Ref Range    POCT pH, Arterial 7.21 (LL) 7.38 - 7.42 pH    POCT pCO2, Arterial 31 (L) 38 - 42 mm Hg    POCT pO2, Arterial 63 (L) 85 - 95 mm Hg    POCT SO2, Arterial 89 (L) 94 - 100 %    POCT Oxy Hemoglobin, Arterial 87.9 (L) 94.0 - 98.0 %    POCT Hematocrit Calculated, Arterial 22.0 (L) 36.0 - 46.0 %    POCT Sodium, Arterial 126 (L) 136 - 145 mmol/L    POCT Potassium, Arterial 4.3 3.5 - 5.3 mmol/L    POCT Chloride, Arterial 98 98 - 107 mmol/L    POCT Ionized Calcium, Arterial 1.17 1.10 - 1.33 mmol/L    POCT Glucose, Arterial 268 (H) 74 - 99 mg/dL    POCT Lactate, Arterial 4.2 (HH) 0.4 - 2.0 mmol/L    POCT Base Excess, Arterial -14.2 (L) -2.0 - 3.0 mmol/L    POCT HCO3 Calculated, Arterial 12.4 (L) 22.0 - 26.0 mmol/L    POCT Hemoglobin, Arterial 7.4 (L) 12.0 - 16.0 g/dL    POCT Anion Gap, Arterial 20 10 - 25 mmo/L    Patient Temperature 37.0 degrees Celsius    FiO2 100 %   Blood Gas Arterial Full Panel   Result Value Ref Range    POCT pH, Arterial 7.21 (LL) 7.38 - 7.42 pH    POCT pCO2, Arterial 32 (L) 38 - 42 mm Hg    POCT pO2, Arterial 54 (L) 85 - 95 mm Hg    POCT SO2, Arterial 81 (L) 94 - 100 %    POCT Oxy Hemoglobin, Arterial 80.1 (L) 94.0 - 98.0 %    POCT Hematocrit Calculated, Arterial 23.0 (L) 36.0 - 46.0 %    POCT Sodium, Arterial 124 (L) 136 - 145 mmol/L    POCT Potassium, Arterial 4.4 3.5 - 5.3 mmol/L    POCT Chloride,  Arterial 98 98 - 107 mmol/L    POCT Ionized Calcium, Arterial 1.09 (L) 1.10 - 1.33 mmol/L    POCT Glucose, Arterial 289 (H) 74 - 99 mg/dL    POCT Lactate, Arterial 4.3 (HH) 0.4 - 2.0 mmol/L    POCT Base Excess, Arterial -13.9 (L) -2.0 - 3.0 mmol/L    POCT HCO3 Calculated, Arterial 12.8 (L) 22.0 - 26.0 mmol/L    POCT Hemoglobin, Arterial 7.6 (L) 12.0 - 16.0 g/dL    POCT Anion Gap, Arterial 18 10 - 25 mmo/L    Patient Temperature 37.0 degrees Celsius    FiO2 100 %   Blood Gas Arterial Full Panel   Result Value Ref Range    POCT pH, Arterial 7.20 (LL) 7.38 - 7.42 pH    POCT pCO2, Arterial 33 (L) 38 - 42 mm Hg    POCT pO2, Arterial 128 (H) 85 - 95 mm Hg    POCT SO2, Arterial 100 94 - 100 %    POCT Oxy Hemoglobin, Arterial 98.8 (H) 94.0 - 98.0 %    POCT Hematocrit Calculated, Arterial 23.0 (L) 36.0 - 46.0 %    POCT Sodium, Arterial 124 (L) 136 - 145 mmol/L    POCT Potassium, Arterial 4.2 3.5 - 5.3 mmol/L    POCT Chloride, Arterial 97 (L) 98 - 107 mmol/L    POCT Ionized Calcium, Arterial 1.09 (L) 1.10 - 1.33 mmol/L    POCT Glucose, Arterial 296 (H) 74 - 99 mg/dL    POCT Lactate, Arterial 4.4 (HH) 0.4 - 2.0 mmol/L    POCT Base Excess, Arterial -14.0 (L) -2.0 - 3.0 mmol/L    POCT HCO3 Calculated, Arterial 12.9 (L) 22.0 - 26.0 mmol/L    POCT Hemoglobin, Arterial 7.7 (L) 12.0 - 16.0 g/dL    POCT Anion Gap, Arterial 18 10 - 25 mmo/L    Patient Temperature 37.0 degrees Celsius    FiO2 100 %   CBC   Result Value Ref Range    WBC 16.2 (H) 4.4 - 11.3 x10*3/uL    nRBC 0.0 0.0 - 0.0 /100 WBCs    RBC 2.41 (L) 4.00 - 5.20 x10*6/uL    Hemoglobin 7.6 (L) 12.0 - 16.0 g/dL    Hematocrit 23.5 (L) 36.0 - 46.0 %    MCV 98 80 - 100 fL    MCH 31.5 26.0 - 34.0 pg    MCHC 32.3 32.0 - 36.0 g/dL    RDW 21.1 (H) 11.5 - 14.5 %    Platelets 77 (L) 150 - 450 x10*3/uL   Calcium, Ionized CRRT   Result Value Ref Range    POCT CRRT, Calcium Ionized 1.02 Reference range not established mmol/L   Lactate Dehydrogenase   Result Value Ref Range     (H)  84 - 246 U/L   CBC and Auto Differential   Result Value Ref Range    WBC 16.2 (H) 4.4 - 11.3 x10*3/uL    nRBC 0.0 0.0 - 0.0 /100 WBCs    RBC 2.41 (L) 4.00 - 5.20 x10*6/uL    Hemoglobin 7.6 (L) 12.0 - 16.0 g/dL    Hematocrit 23.5 (L) 36.0 - 46.0 %    MCV 98 80 - 100 fL    MCH 31.5 26.0 - 34.0 pg    MCHC 32.3 32.0 - 36.0 g/dL    RDW 21.1 (H) 11.5 - 14.5 %    Platelets 77 (L) 150 - 450 x10*3/uL    Immature Granulocytes %, Automated 0.3 0.0 - 0.9 %    Immature Granulocytes Absolute, Automated 0.05 0.00 - 0.70 x10*3/uL   Manual Differential   Result Value Ref Range    Neutrophils %, Manual 17.0 40.0 - 80.0 %    Bands %, Manual 47.0 0.0 - 5.0 %    Lymphocytes %, Manual 17.0 13.0 - 44.0 %    Monocytes %, Manual 11.0 2.0 - 10.0 %    Eosinophils %, Manual 0.0 0.0 - 6.0 %    Basophils %, Manual 0.0 0.0 - 2.0 %    Metamyelocytes %, Manual 6.0 0.0 - 0.0 %    Myelocytes %, Manual 2.0 0.0 - 0.0 %    Seg Neutrophils Absolute, Manual 2.75 1.20 - 7.00 x10*3/uL    Bands Absolute, Manual 7.61 (H) 0.00 - 0.70 x10*3/uL    Lymphocytes Absolute, Manual 2.75 1.20 - 4.80 x10*3/uL    Monocytes Absolute, Manual 1.78 (H) 0.10 - 1.00 x10*3/uL    Eosinophils Absolute, Manual 0.00 0.00 - 0.70 x10*3/uL    Basophils Absolute, Manual 0.00 0.00 - 0.10 x10*3/uL    Metamyelocytes Absolute, Manual 0.97 0.00 - 0.00 x10*3/uL    Myelocytes Absolute, Manual 0.32 0.00 - 0.00 x10*3/uL    Total Cells Counted 100     Neutrophils Absolute, Manual 10.36 (H) 1.20 - 7.70 x10*3/uL    RBC Morphology See Below     Polychromasia Mild     Teardrop Cells Few     [place] Cells Few    POCT GLUCOSE   Result Value Ref Range    POCT Glucose 333 (H) 74 - 99 mg/dL   POCT GLUCOSE   Result Value Ref Range    POCT Glucose 303 (H) 74 - 99 mg/dL   Blood Gas Arterial Full Panel   Result Value Ref Range    POCT pH, Arterial 7.23 (LL) 7.38 - 7.42 pH    POCT pCO2, Arterial 35 (L) 38 - 42 mm Hg    POCT pO2, Arterial 84 (L) 85 - 95 mm Hg    POCT SO2, Arterial 98 94 - 100 %    POCT Oxy  Hemoglobin, Arterial 96.4 94.0 - 98.0 %    POCT Hematocrit Calculated, Arterial 23.0 (L) 36.0 - 46.0 %    POCT Sodium, Arterial 125 (L) 136 - 145 mmol/L    POCT Potassium, Arterial 4.2 3.5 - 5.3 mmol/L    POCT Chloride, Arterial      POCT Ionized Calcium, Arterial 1.03 (L) 1.10 - 1.33 mmol/L    POCT Glucose, Arterial 286 (H) 74 - 99 mg/dL    POCT Lactate, Arterial 4.6 (HH) 0.4 - 2.0 mmol/L    POCT Base Excess, Arterial -11.8 (L) -2.0 - 3.0 mmol/L    POCT HCO3 Calculated, Arterial 14.7 (L) 22.0 - 26.0 mmol/L    POCT Hemoglobin, Arterial 7.5 (L) 12.0 - 16.0 g/dL    POCT Anion Gap, Arterial      Patient Temperature 37.0 degrees Celsius    FiO2 100 %   CBC   Result Value Ref Range    WBC 16.1 (H) 4.4 - 11.3 x10*3/uL    nRBC 0.0 0.0 - 0.0 /100 WBCs    RBC 2.43 (L) 4.00 - 5.20 x10*6/uL    Hemoglobin 7.8 (L) 12.0 - 16.0 g/dL    Hematocrit 21.4 (L) 36.0 - 46.0 %    MCV 88 80 - 100 fL    MCH 32.1 26.0 - 34.0 pg    MCHC 36.4 (H) 32.0 - 36.0 g/dL    RDW 20.2 (H) 11.5 - 14.5 %    Platelets 71 (L) 150 - 450 x10*3/uL   Hepatic Function Panel   Result Value Ref Range    Albumin 2.7 (L) 3.4 - 5.0 g/dL    Bilirubin, Total 2.4 (H) 0.0 - 1.2 mg/dL    Bilirubin, Direct 1.3 (H) 0.0 - 0.3 mg/dL    Alkaline Phosphatase 81 33 - 110 U/L    ALT 13 7 - 45 U/L    AST 44 (H) 9 - 39 U/L    Total Protein 5.5 (L) 6.4 - 8.2 g/dL   Renal Function Panel   Result Value Ref Range    Glucose 240 (H) 74 - 99 mg/dL    Sodium 128 (L) 136 - 145 mmol/L    Potassium 4.0 3.5 - 5.3 mmol/L    Chloride 97 (L) 98 - 107 mmol/L    Bicarbonate 17 (L) 21 - 32 mmol/L    Anion Gap 18 10 - 20 mmol/L    Urea Nitrogen 15 6 - 23 mg/dL    Creatinine 3.62 (H) 0.50 - 1.05 mg/dL    eGFR 15 (L) >60 mL/min/1.73m*2    Calcium 7.4 (L) 8.6 - 10.6 mg/dL    Phosphorus 5.1 (H) 2.5 - 4.9 mg/dL    Albumin 2.7 (L) 3.4 - 5.0 g/dL   Lactate   Result Value Ref Range    Lactate 5.5 (HH) 0.4 - 2.0 mmol/L   Magnesium   Result Value Ref Range    Magnesium 1.82 1.60 - 2.40 mg/dL   POCT GLUCOSE    Result Value Ref Range    POCT Glucose 258 (H) 74 - 99 mg/dL   POCT GLUCOSE   Result Value Ref Range    POCT Glucose 222 (H) 74 - 99 mg/dL   POCT GLUCOSE   Result Value Ref Range    POCT Glucose 189 (H) 74 - 99 mg/dL   POCT GLUCOSE   Result Value Ref Range    POCT Glucose 193 (H) 74 - 99 mg/dL   POCT GLUCOSE   Result Value Ref Range    POCT Glucose 229 (H) 74 - 99 mg/dL   POCT GLUCOSE   Result Value Ref Range    POCT Glucose 215 (H) 74 - 99 mg/dL   CBC and Auto Differential   Result Value Ref Range    WBC 21.5 (H) 4.4 - 11.3 x10*3/uL    nRBC 0.0 0.0 - 0.0 /100 WBCs    RBC 2.32 (L) 4.00 - 5.20 x10*6/uL    Hemoglobin 7.5 (L) 12.0 - 16.0 g/dL    Hematocrit 20.2 (L) 36.0 - 46.0 %    MCV 87 80 - 100 fL    MCH 32.3 26.0 - 34.0 pg    MCHC 37.1 (H) 32.0 - 36.0 g/dL    RDW 19.9 (H) 11.5 - 14.5 %    Platelets 59 (L) 150 - 450 x10*3/uL    Immature Granulocytes %, Automated 0.7 0.0 - 0.9 %    Immature Granulocytes Absolute, Automated 0.16 0.00 - 0.70 x10*3/uL   Hepatic function panel   Result Value Ref Range    Albumin 2.6 (L) 3.4 - 5.0 g/dL    Bilirubin, Total 2.6 (H) 0.0 - 1.2 mg/dL    Bilirubin, Direct 1.4 (H) 0.0 - 0.3 mg/dL    Alkaline Phosphatase 72 33 - 110 U/L    ALT 14 7 - 45 U/L    AST 51 (H) 9 - 39 U/L    Total Protein 5.4 (L) 6.4 - 8.2 g/dL   BUN   Result Value Ref Range    Urea Nitrogen 12 6 - 23 mg/dL   Creatinine, Serum   Result Value Ref Range    Creatinine 2.87 (H) 0.50 - 1.05 mg/dL    eGFR 20 (L) >60 mL/min/1.73m*2   Electrolyte panel   Result Value Ref Range    Sodium 128 (L) 136 - 145 mmol/L    Potassium 3.7 3.5 - 5.3 mmol/L    Chloride 97 (L) 98 - 107 mmol/L    Bicarbonate 17 (L) 21 - 32 mmol/L    Anion Gap 18 10 - 20 mmol/L   Calcium, ionized   Result Value Ref Range    POCT Calcium, Ionized 1.00 (L) 1.1 - 1.33 mmol/L   Phosphorus   Result Value Ref Range    Phosphorus 3.3 2.5 - 4.9 mg/dL   Renal Function Panel   Result Value Ref Range    Glucose 194 (H) 74 - 99 mg/dL    Sodium 127 (L) 136 - 145 mmol/L     Potassium 3.7 3.5 - 5.3 mmol/L    Chloride 98 98 - 107 mmol/L    Bicarbonate 16 (L) 21 - 32 mmol/L    Anion Gap 17 10 - 20 mmol/L    Urea Nitrogen 12 6 - 23 mg/dL    Creatinine 2.80 (H) 0.50 - 1.05 mg/dL    eGFR 21 (L) >60 mL/min/1.73m*2    Calcium 7.3 (L) 8.6 - 10.6 mg/dL    Phosphorus 3.4 2.5 - 4.9 mg/dL    Albumin 2.6 (L) 3.4 - 5.0 g/dL   Magnesium   Result Value Ref Range    Magnesium 1.80 1.60 - 2.40 mg/dL   Hemoglobin A1c   Result Value Ref Range    Hemoglobin A1C 4.8 see below %    Estimated Average Glucose 91 Not Established mg/dL   Lactate   Result Value Ref Range    Lactate 5.5 (HH) 0.4 - 2.0 mmol/L   Manual Differential   Result Value Ref Range    Neutrophils %, Manual 30.0 40.0 - 80.0 %    Lymphocytes %, Manual 15.0 13.0 - 44.0 %    Monocytes %, Manual 52.0 2.0 - 10.0 %    Eosinophils %, Manual 1.0 0.0 - 6.0 %    Basophils %, Manual 0.0 0.0 - 2.0 %    Metamyelocytes %, Manual 2.0 0.0 - 0.0 %    Seg Neutrophils Absolute, Manual 6.45 1.20 - 7.00 x10*3/uL    Lymphocytes Absolute, Manual 3.23 1.20 - 4.80 x10*3/uL    Monocytes Absolute, Manual 11.18 (H) 0.10 - 1.00 x10*3/uL    Eosinophils Absolute, Manual 0.22 0.00 - 0.70 x10*3/uL    Basophils Absolute, Manual 0.00 0.00 - 0.10 x10*3/uL    Metamyelocytes Absolute, Manual 0.43 0.00 - 0.00 x10*3/uL    Total Cells Counted 100     RBC Morphology See Below     Polychromasia Mild     Teardrop Cells Few    Blood Gas Arterial Full Panel   Result Value Ref Range    POCT pH, Arterial 7.32 (L) 7.38 - 7.42 pH    POCT pCO2, Arterial 30 (L) 38 - 42 mm Hg    POCT pO2, Arterial 118 (H) 85 - 95 mm Hg    POCT SO2, Arterial 99 94 - 100 %    POCT Oxy Hemoglobin, Arterial 98.2 (H) 94.0 - 98.0 %    POCT Hematocrit Calculated, Arterial 22.0 (L) 36.0 - 46.0 %    POCT Sodium, Arterial 125 (L) 136 - 145 mmol/L    POCT Potassium, Arterial 3.6 3.5 - 5.3 mmol/L    POCT Chloride, Arterial 97 (L) 98 - 107 mmol/L    POCT Ionized Calcium, Arterial 1.07 (L) 1.10 - 1.33 mmol/L    POCT  Glucose, Arterial 195 (H) 74 - 99 mg/dL    POCT Lactate, Arterial 4.8 (HH) 0.4 - 2.0 mmol/L    POCT Base Excess, Arterial -9.6 (L) -2.0 - 3.0 mmol/L    POCT HCO3 Calculated, Arterial 15.5 (L) 22.0 - 26.0 mmol/L    POCT Hemoglobin, Arterial 7.4 (L) 12.0 - 16.0 g/dL    POCT Anion Gap, Arterial 16 10 - 25 mmo/L    Patient Temperature 37.0 degrees Celsius    FiO2 80 %   POCT GLUCOSE   Result Value Ref Range    POCT Glucose 210 (H) 74 - 99 mg/dL   POCT GLUCOSE   Result Value Ref Range    POCT Glucose 155 (H) 74 - 99 mg/dL   Blood Gas Arterial Full Panel   Result Value Ref Range    POCT pH, Arterial 7.37 (L) 7.38 - 7.42 pH    POCT pCO2, Arterial 29 (L) 38 - 42 mm Hg    POCT pO2, Arterial 134 (H) 85 - 95 mm Hg    POCT SO2, Arterial 99 94 - 100 %    POCT Oxy Hemoglobin, Arterial 97.9 94.0 - 98.0 %    POCT Hematocrit Calculated, Arterial 23.0 (L) 36.0 - 46.0 %    POCT Sodium, Arterial 126 (L) 136 - 145 mmol/L    POCT Potassium, Arterial 3.8 3.5 - 5.3 mmol/L    POCT Chloride, Arterial 98 98 - 107 mmol/L    POCT Ionized Calcium, Arterial 1.06 (L) 1.10 - 1.33 mmol/L    POCT Glucose, Arterial 153 (H) 74 - 99 mg/dL    POCT Lactate, Arterial 4.3 (HH) 0.4 - 2.0 mmol/L    POCT Base Excess, Arterial -7.6 (L) -2.0 - 3.0 mmol/L    POCT HCO3 Calculated, Arterial 16.8 (L) 22.0 - 26.0 mmol/L    POCT Hemoglobin, Arterial 7.6 (L) 12.0 - 16.0 g/dL    POCT Anion Gap, Arterial 15 10 - 25 mmo/L    Patient Temperature 37.0 degrees Celsius    FiO2 80 %   POCT GLUCOSE   Result Value Ref Range    POCT Glucose 141 (H) 74 - 99 mg/dL   Fibrinogen   Result Value Ref Range    Fibrinogen 56 (LL) 200 - 400 mg/dL   POCT GLUCOSE   Result Value Ref Range    POCT Glucose 164 (H) 74 - 99 mg/dL   Blood Gas Arterial Full Panel   Result Value Ref Range    POCT pH, Arterial 7.36 (L) 7.38 - 7.42 pH    POCT pCO2, Arterial 32 (L) 38 - 42 mm Hg    POCT pO2, Arterial 91 85 - 95 mm Hg    POCT SO2, Arterial 99 94 - 100 %    POCT Oxy Hemoglobin, Arterial 97.2 94.0 - 98.0  %    POCT Hematocrit Calculated, Arterial 23.0 (L) 36.0 - 46.0 %    POCT Sodium, Arterial 127 (L) 136 - 145 mmol/L    POCT Potassium, Arterial 3.9 3.5 - 5.3 mmol/L    POCT Chloride, Arterial 98 98 - 107 mmol/L    POCT Ionized Calcium, Arterial 1.01 (L) 1.10 - 1.33 mmol/L    POCT Glucose, Arterial 149 (H) 74 - 99 mg/dL    POCT Lactate, Arterial 4.2 (HH) 0.4 - 2.0 mmol/L    POCT Base Excess, Arterial -6.7 (L) -2.0 - 3.0 mmol/L    POCT HCO3 Calculated, Arterial 18.1 (L) 22.0 - 26.0 mmol/L    POCT Hemoglobin, Arterial 7.5 (L) 12.0 - 16.0 g/dL    POCT Anion Gap, Arterial 15 10 - 25 mmo/L    Patient Temperature 37.0 degrees Celsius    FiO2 60 %                                                                              Imaging:     XR chest 1 view    Result Date: 4/11/2024  Interpreted By:  Jayme Johnson, STUDY: XR CHEST 1 VIEW;  4/11/2024 7:58 am   INDICATION: Signs/Symptoms:ARDS.   COMPARISON: Exam dated 04/10/2024   ACCESSION NUMBER(S): [id number]   ORDERING CLINICIAN: VIKAS BARRY   FINDINGS: AP radiograph of the chest was provided.   Endotracheal tube tip projects 3.6 cm above the faby. Enteric tube projects over the esophagus and left upper quadrant, tip not visualized. Left internal jugular central venous catheter tip projects over the lower SVC. Right internal jugular central venous catheter tip projects over the superior cavoatrial junction.   CARDIOMEDIASTINAL SILHOUETTE: Cardiomediastinal silhouette is stable in size and configuration.   LUNGS: Interval increase in multifocal bilateral airspace opacities, left-greater-than-right. No large pleural effusion or evidence of pneumothorax.   ABDOMEN: No remarkable upper abdominal findings.   BONES: No acute osseous changes.       1.  Interval increase in left-greater-than-right airspace opacities. Consider pulmonary edema, multifocal infection, pulmonary hemorrhage or ARDS. 2. Medical devices as above.       MACRO: None   Signed by: Jayme Johnson 4/11/2024 8:48  AM Dictation workstation:   [id number]    XR chest 1 view    Result Date: 4/10/2024  Interpreted By:  Marylou Hugo and Nakamoto Kent STUDY: XR CHEST 1 VIEW;  4/10/2024 3:12 pm   INDICATION: Signs/Symptoms:confirm cvc.   COMPARISON: Same day chest radiograph 7:47 a.m.   ACCESSION NUMBER(S): [id number]   ORDERING CLINICIAN: VIKAS BARRY   FINDINGS: AP radiograph of the chest was provided. Radiograph is rotated to the right.   Endotracheal tube noted with tip projecting approximately 5.6 cm above the faby. Left IJ CVC with tip terminating at the cavoatrial junction. Enteric tube seen coursing below the level diaphragm with tip out of the field of view.   CARDIOMEDIASTINAL SILHOUETTE: Cardiomediastinal silhouette is stable in size and configuration.   LUNGS: No pneumothorax. There is similar multifocal consolidative opacities within the bilateral lungs. There is minimally worsened aeration of the left upper lobe. Bilateral costophrenic angles appear to be blunted.   ABDOMEN: No remarkable upper abdominal findings.   BONES: No acute osseous changes.       1. Redemonstrated multifocal hazy opacities within the bilateral lungs. There is decreased aeration of the left upper lobe compared to prior exam. These findings are again compatible with pulmonary alveolar edema or infective/inflammatory process. 2. Bilateral small pleural effusions. 3. Medical devices as described above.   I personally reviewed the images/study and I agree with the findings as stated by Kennedy Garcia MD. This study was interpreted at [facility], [place], [place].   MACRO: None   Signed by: Marylou Hugo 4/10/2024 3:22 PM Dictation workstation:   [id number]    CT chest abdomen pelvis wo IV contrast    Result Date: 4/10/2024  Interpreted By:  Maxime Wilkes and Fu Tianyuan STUDY: CT CHEST ABDOMEN PELVIS WO CONTRAST;  4/9/2024 2:13 pm   INDICATION: Signs/Symptoms:Sepsis. Presenting with altered mental  status.   COMPARISON: None.   ACCESSION NUMBER(S): [id number]   ORDERING CLINICIAN: BESS SEGOVIA   TECHNIQUE: CT of the chest, abdomen and pelvis was performed. Contiguous axial images were obtained at 3 mm slice thickness through the chest, abdomen and pelvis. Coronal and sagittal reconstructions at 3 mm slice thickness were performed. No intravenous contrast was administered.   FINDINGS: Please note that the study is limited without intravenous contrast.   CHEST:   LUNG/PLEURA/LARGE AIRWAYS: Endotracheal tube in place with the tip terminating at the level of the faby/right mainstem bronchus. Mild secretions in the distal trachea.   There are extensive confluent and patchy consolidations and ground-glass opacities throughout the bilateral lungs consistent with multifocal pneumonia. Small bilateral pleural effusions, right-greater-than-left. No pneumothorax.   VESSELS: Aorta and pulmonary arteries are normal caliber.  No significant atherosclerotic changes of the thoracic aorta. Mild coronary artery calcifications are present.   HEART: The heart is normal in size. Small pericardial effusion.   MEDIASTINUM AND CHARIS: No mediastinal, hilar, or axillary lymphadenopathy. The esophagus is within normal limits.   CHEST WALL AND LOWER NECK: Severe diffuse chest wall edema is present. The visualized thyroid gland appears within normal limits.   ABDOMEN:   LIVER: Normal size and smooth contour. Severe diffuse hepatic steatosis. No focal parenchymal abnormalities are seen.   BILE DUCTS: The intrahepatic and extrahepatic bile ducts are nondilated.   GALLBLADDER: Cholelithiasis. The gallbladder is nondistended.   PANCREAS: The pancreas is unremarkable without evidence of ductal dilation or masses.   SPLEEN: The spleen is normal in size and without evidence of focal lesions.   ADRENAL GLANDS: Within normal limits.   KIDNEYS AND URETERS: Bilateral kidneys are mildly atrophic. Punctate 0.3 cm calculus at the right upper  pole. No hydronephrosis.   PELVIS:   BLADDER: The bladder is decompressed with Tadeo catheter in place.   REPRODUCTIVE ORGANS: The uterus is present. No suspicious pelvic masses.   BOWEL: The stomach is unremarkable.  The small and large bowel are normal in caliber and demonstrate no wall thickening.  The appendix is not definitively visualized.   VESSELS: There is no aneurysmal dilatation of the abdominal aorta. The IVC is within normal limits. Mild atherosclerotic changes of the abdominal aorta and its branch vessels.   PERITONEUM/RETROPERITONEUM/LYMPH NODES: There is large volume ascites. No loculated fluid collections or intraperitoneal free air. No abdominopelvic lymphadenopathy is present.   ABDOMINAL WALL: Severe diffuse abdominal wall anasarca.   BONES: No acute osseous abnormality or suspicious osseous lesions.       Chest 1. Endotracheal tube in place with the tip terminating at the level of the origin of the right mainstem bronchus. 2. Extensive consolidation/ground-glass opacities throughout both lungs consistent with multifocal pneumonia. 3. Small bilateral pleural effusions. 4. Small pericardial effusion.   Abdomen-Pelvis 1. Large volume ascites. 2. Severe diffuse hepatic steatosis. 3. Cholelithiasis. 4. Severe body wall anasarca.   I personally reviewed the images/study and I agree with the findings as stated by resident physician Rosales Vernon MD. This study was interpreted at [facility], [place], [place].   The above information was relayed to Ordering provider Perri Watts by Rosales Vernon MD via epic haiku at 2:30 p.m. on 04/09/2024 with readback verification.   MACRO: None   Signed by: Maxime Wilkes 4/10/2024 12:20 PM Dictation workstation:   [id number]    XR chest 1 view    Result Date: 4/10/2024  Interpreted By:  Clary Mcmillan, STUDY: XR CHEST 1 VIEW; 4/10/2024 7:49 am   INDICATION: Signs/Symptoms:dyspnea.   COMPARISON: Radiograph dated 04/09/2024    ACCESSION NUMBER(S): [id number]   ORDERING CLINICIAN: VIKAS BARRY   FINDINGS: ET tube is terminating 3.9 cm from the faby. Enteric tube is in place with the tip outside the field of view. Right IJ central venous catheter is projecting over right atrium.   The cardiac silhouette size is within normal limits.   Multifocal consolidation in bilateral lungs with slight improvement in the aeration of the right upper lung. Small left pleural effusion. No sizable pneumothorax.   No acute osseous change.       1. Multifocal consolidative opacities with increased interstitial markings. Findings may be due to severe edema or multifocal infection. Aeration of the right upper lung is improved compared to prior study. 2. Small left pleural effusion. 3. Medical devices as described       Signed by: Clary Green 4/10/2024 8:09 AM Dictation workstation:   [id number]    US liver with doppler    Result Date: 4/10/2024  Interpreted By:  Robert Lwaler  and Katherine Nelson STUDY: US LIVER WITH DOPPLER;  4/10/2024 4:12 am   INDICATION: Signs/Symptoms:SBP, alcohol cirrhosis decompensated; with dopplers.   COMPARISON: CT cap 04/09/2024   ACCESSION NUMBER(S): [id number]   ORDERING CLINICIAN: FERNANDO GUIDRY   TECHNIQUE: Multiple images of the right upper quadrant were obtained.  Gray scale, color Doppler and spectral Doppler waveform analysis was performed.   FINDINGS: Examination is significantly limited secondary to patient body habitus and extensive ascites. Within the limitation:   LIVER: The liver is small, heterogeneous, with nodular contour measuring 13.9 cm in the craniocaudal dimension.   GALLBLADDER: Cholelithiasis is seen without gallbladder dilatation, wall thickening, or pericholecystic fluid. Sonographic Jackson's sign is negative.   BILIARY SYSTEM: No significant intrahepatic biliary ductal dilatation. The CBD was not seen.   DOPPLER EVALUATION:   HEPATIC ARTERIES: Poorly visualized.   PORTAL VEIN: Limited  evaluation of the portal vein system and branches. The main portal vein is patent measures 0.8 cm with peak velocity of 19 cm/s.   HEPATIC VEIN: The right, middle and left hepatic veins are patent and demonstrate triphasic antegrade flow. IVC appears also patent.   PANCREAS: The pancreas is poorly visualized due to overlying bowel gas.   RIGHT KIDNEY: The right kidney measures 7.9 cm in length. No overt hydronephrosis or renal calculi are seen.   SPLEEN: Poorly visualized.   PERITONEUM AND RETROPERITONEUM: Large volume ascites is seen.       Significantly limited examination.   1. Hepatic cirrhosis with large volume ascites. Limited evaluation of the portal venous system. 2. Cholelithiasis without acute cholecystitis.     I personally reviewed the images/study and I agree with Dr. Omar Murphy and the findings as stated.   MACRO: None   Signed by: Robert Lawler 4/10/2024 5:59 AM Dictation workstation:   [id number]    ECG 12 lead    Result Date: 4/10/2024  Sinus tachycardia with Premature atrial complexes with Aberrant conduction Low voltage QRS Nonspecific T wave abnormality Abnormal ECG No previous ECGs available See ED provider note for full interpretation and clinical correlation Confirmed by Cesar Bentley ([id number]) on 4/10/2024 2:36:33 AM    XR abdomen 1 view    Result Date: 4/9/2024  Interpreted By:  Omer Tsai, STUDY: XR ABDOMEN 1 VIEW;  4/9/2024 6:19 pm   INDICATION: Signs/Symptoms:OG placement.   COMPARISON: CT chest abdomen pelvis 04/09/2024.   ACCESSION NUMBER(S): [id number]   ORDERING CLINICIAN: JAIME HE   FINDINGS: Enteric tube courses midline below the level of the diaphragm with the tip projecting over the expected location of the gastric antrum.   Nonobstructive bowel gas pattern. There is centralization of visualized loops of large bowel consistent with large volume ascites and diffuse anasarca on CT from 04/09/2024. Limited evaluation of pneumoperitoneum on supine imaging, however  no gross evidence of free air is noted.   Unchanged patchy alveolar opacities throughout the bilateral lower lungs compared to prior examination with small left pleural effusion.   Osseous structures demonstrate no acute bony changes.       1.  Enteric tube with the distal tip projecting over the expected location of the gastric antrum. 2. Nonobstructive bowel gas pattern with centralization of visualized loops of large bowel consistent with previously observed large volume ascites and diffuse anasarca. 3. Unchanged patchy alveolar opacities and small left-sided pleural effusion better characterized on same day x-ray of the chest from 04/09/2024.   I personally reviewed the images/study and I agree with the findings as stated by resident Omer Tsai. This study was interpreted at [facility], [place], [place].   MACRO: None     Dictation workstation:   [id number]    XR chest 1 view    Result Date: 4/9/2024  Interpreted By:  Kosmas, Servando,  and Shazia Lea STUDY: XR CHEST 1 VIEW;  4/9/2024 3:35 pm   INDICATION: Signs/Symptoms:central line placement, RIJ.   COMPARISON: Same day chest radiograph time stamped 1:34 p.m.   ACCESSION NUMBER(S): [id number]   ORDERING CLINICIAN: RYAN GUERRERO   FINDINGS: Single AP view of the chest was provided.   Interval retraction of an endotracheal tube with tip now terminating 3.2 cm above the faby, in satisfactory positioning. Interval placement of a right internal jugular approach central venous catheter with tip overlying the superior cavoatrial junction.   CARDIOMEDIASTINAL SILHOUETTE: Similar obscuration of much of the cardiac border due to overlying pulmonary opacities.   LUNGS: Similar dense patchy alveolar opacities throughout the bilateral lungs, primarily in the right upper lobe. Probable small left pleural effusion. No pneumothorax.   ABDOMEN: Hyperdense material overlying the stomach. Mild dilation of multiple loops of  bowel in the upper abdomen.   BONES: No acute osseous changes.       1. Satisfactory positioning of right internal jugular approach central venous catheter. 2. Unchanged appearance of patchy pulmonary opacities throughout the bilateral lungs, particularly in the right upper lobe, with a probable small left pleural effusion. 3. Hyperdense material overlying the stomach with multiple dilated loops of bowel in the upper abdomen. Correlate with same day CT.   I personally reviewed the image(s)/study and resident interpretation as stated by Dr. Lea Mccray MD. I agree with the findings as stated. This study was interpreted at [facility], [place], [place].   MACRO: None   Signed by: Servando Sosa 4/9/2024 4:09 PM Dictation workstation:   [id number]    CT head wo IV contrast    Result Date: 4/9/2024  Interpreted By:  Bob Mitchell, STUDY: CT HEAD WO IV CONTRAST;  4/9/2024 2:08 pm   INDICATION: Signs/Symptoms:AMS.   COMPARISON: None.   ACCESSION NUMBER(S): [id number]   ORDERING CLINICIAN: BRITT JOHNSON   TECHNIQUE: Noncontrast axial CT scan of head was performed.   FINDINGS: Parenchyma: There is no intracranial hemorrhage. The grey-white differentiation is intact. There is no mass effect or midline shift. Patchy nonspecific white matter hypodensities involving the deep white matter of the bilateral cerebral hemispheres.   CSF Spaces: The ventricles, sulci and basal cisterns are within normal limits for age.   Extra-Axial Fluid: There is no extraaxial fluid collection.   Calvarium: The calvarium is unremarkable.   Paranasal sinuses: Visualized paranasal sinuses are clear.   Mastoids: Clear.   Orbits: Normal.   Soft tissues: Unremarkable.       No acute intracranial hemorrhage, mass effect, or CT apparent acute infarct.   Mild nonspecific patchy white matter hypodensity involving the bilateral cerebral hemispheres that may reflect age advanced chronic small vessel ischemic  disease. MRI may be helpful for further characterization as clinically warranted.   MACRO: None   Signed by: Bob Mitchell 4/9/2024 2:30 PM Dictation workstation:   [id number]    XR chest 1 view    Result Date: 4/9/2024  Interpreted By:  Kosmas, Servando,  and Shazia Lea STUDY: XR CHEST 1 VIEW;  4/9/2024 1:43 pm   INDICATION: Signs/Symptoms:Intubation.   COMPARISON: Chest radiograph 04/09/2024 time stamped 11:53 a.m..   ACCESSION NUMBER(S): [id number]   ORDERING CLINICIAN: BESS SEGOVIA   FINDINGS: Single AP supine radiograph of the chest was provided.   Interval intubation with endotracheal tube tip terminating 0.6 cm above the faby.   CARDIOMEDIASTINAL SILHOUETTE: Much of the cardiomediastinal silhouette is obscured by adjacent pulmonary opacities.   LUNGS: Increasing patchy alveolar pulmonary opacities throughout the bilateral lungs, particularly worsening in the right upper lung field and adjacent to the right heart border. Probable small left pleural effusion. No pneumothorax.   ABDOMEN: No remarkable upper abdominal findings.   BONES: No acute osseous changes.       1. Interval intubation with endotracheal tube 0.6 cm above the faby, recommend slight retraction. 2. Worsening patchy alveolar opacities throughout the bilateral lungs, particularly in the right upper and middle lobes suspicious for multifocal pneumonia likely with a component of pulmonary edema.   I personally reviewed the image(s)/study and resident interpretation as stated by Dr. Lea Mccray MD. I agree with the findings as stated. This study was interpreted at [facility], [place], [place].   MACRO: None   Signed by: Servando Sosa 4/9/2024 2:19 PM Dictation workstation:   [id number]    XR chest 1 view    Result Date: 4/9/2024  Interpreted By:  Servando Sosa, STUDY: XR CHEST 1 VIEW;  4/9/2024 12:02 pm   INDICATION: Signs/Symptoms:Hypoxia.   COMPARISON: None.   ACCESSION NUMBER(S):  [id number]   ORDERING CLINICIAN: BESS SEGOVIA   FINDINGS:         CARDIOMEDIASTINAL SILHOUETTE: Cardiomediastinal silhouette is normal in size and configuration.   LUNGS: There is extensive multifocal dense airspace disease worse in the right upper lung and the bases bilaterally.   ABDOMEN: No remarkable upper abdominal findings.   BONES: No acute osseous changes.       Extensive multifocal dense airspace disease compatible with multifocal pneumonia. CT or radiographic follow-up to resolution is advised   MACRO: None   Signed by: Servando Sosa 4/9/2024 12:49 PM Dictation workstation:   [id number]                                                                           GI Procedures     DATE OF EXAM: Mar  8 2024  9:00AM    [facility] [place]   [address]  -  US ABD LIVER VASCULAR  / ACCESSION #  [id number]   IMPRESSION:   Patent hepatic vasculature with appropriately directed flow.   Diffuse hepatic steatosis. Cholelithiasis.      Upper GI endoscopy   3/1/2024  Findings:       The examined esophagus was normal.        Type 1 isolated gastric varices (IGV1, varices located in the fundus)        with no bleeding were found in the gastric fundus. They were large in        largest diameter. EUS was performed and given large size and extent,        decision was made to perform endoscoic glue injection rather than EUS        guided coil injection. The regular GIF was inserted and gastric        varices were successfully injected with 1 mL cyanoacrylate for        eradication of varices.        A moderate post-ulcer deformity was found in the duodenal bulb.        The second portion of the duodenum was normal.   Impression:            - EUS with large gastric varices.                          - Normal esophagus.                          - Type 1 isolated gastric varices (IGV1, varices                          located in the fundus), without bleeding. Injected.                          - Duodenal deformity.                          -  Normal second portion of the duodenum.                          - No specimens collected.   Estimated Blood Loss:  Estimated blood loss: none.   Recommendation:        - Return patient to ICU for ongoing care.                          - NPO.                          - Patient has a contact number available for                          emergencies. The signs and symptoms of potential                          delayed complications were discussed with the                          patient. Return to normal activities tomorrow.                          Written discharge instructions were provided to the                          patient.      Attending Participation:        I personally performed the entire procedure.   Scope In: 1:15:46 PM   Scope Out: 2:28:54 PM   MD Lashell Kent MD   3/1/2024 6:04:34 PM   This report has been signed electronically by MD Kaylen Myers MD       ASSESSMENT / PLAN     ASSESSMENT/PLAN:    Ms. Vera Beard is a 43yoF with alcohol use disorder c/b cirrhosis, HE (intermittently refuses lactulose), Hx GIB (gastric varices, no banding, injected with cyanoacrylate on 3/1/24) and chronic pancreatitis who presented to ED 4/9 for AMS and lethargy, intubated for airway protection. Currently in MICU on vasopressor support and mechanical ventilation for suspected septic shock from multifocal pneumonia + hemorrhagic shock  given Hgb of 6.3 on admission. Recent admission at [facility] from 2/29-3/9 for hemetemesis --> hemorrhagic shock from upper GIB w/ Hgb of 3.2, lactate 13, hospital course noteworthy for cardiac arrest due to acidosis during emergent intubation, ROSC after 2 rounds with no neuro deficits. Left AMA in setting of downtrending Hgb.     Acute decompensated liver cirrhosis likely due to septic shock, HE?  Metabolic encephalopathy due to decompensated cirrhosis, Hx HE, metabolic derangements, sepsis  Suspected septic shock due to multifocal Pseudomonal  pneumonia + SBP  AHRF due to above requiring mechanical ventilation  Possible HRS  Acute on chronic macrocytic anemia s/p 2 units PRBCs   Oliguric KEITH requiring CVVH  Thrombocytopenia and coagulopathy due to Hx cirrhosis, shock  Lactic acidosis  -SCR of 4.8 on admission (baseline 1)--> 2.8 in setting of CRRT  -diagnostic paracentesis in ED w/ 2,072,000 RBC, 17,078 WBC w. 76% PMNs; 4691 PMNs after correction for RBCs   -Hb 6.3 on admission, previously 3.2 on admission at [facility], baseline ~11; s/p 2 unit PRBCs. No obvious bleeding to date, brown BMs  -US doppler --> Hepatic cirrhosis with large volume ascites. Limited evaluation of the portal venous system.  -75g 25% albumin, 1L LR in ED     MELD 3.0: 35 at 2024  2:00 AM  MELD-Na: 32 at 2024  2:00 AM  Calculated from:  Serum Creatinine: 2.87 mg/dL at 2024  2:00 AM  Serum Sodium: 128 mmol/L at 2024  2:00 AM  Total Bilirubin: 2.6 mg/dL at 2024  2:00 AM  Serum Albumin: 2.6 g/dL at 2024  2:00 AM  INR(ratio): 2.0 at 4/10/2024  5:11 AM  Age at listing (hypothetical): 43 years  Sex: Female at 2024  2:00 AM     - Ascites: present, large amount, last tap 24  +SBP w/ correction from traumatic tap. No prior history SBP- no fluid studies to review, no ascites on CT a/P or ultrasound. Will need SBP ppx once off antibiotics  - Volume: Diffusely overloaded, anasarca. No home diuretics, would not start at this time given severe KEITH, on CVVH  - EV: gastric varices c/b hemorrhagic shock [facility] admission 3/9, present on EGD 3/1/24 IGV1 s/p glue embolization  - HE: unable to assess given sedation. No clear history HE, no home lactulose on med rec. Currently on lactulose/rifaximin  - HCC screenin/10 Ultrasound liver without lesions, no AFP sent  - Transplant: Defer given critical illness.      Recommendations  -Agree w/ management per MICU team, continue empiric abx  -Trend daily MELD labs including INR  -Continue Rifaximin 550 mg twice daily,  Lactulose 20 g every 2 hours until bowel movements achieved; will then titrate to 3-4 bowel movements per day  -Monitor for overt GIB  -Transfuse Hb<7, goal Hgb 7-8, avoid over transfusion as can worsen portal HTN  -Will need SBP ppx once off broad spectrum antibiotics  -Today is Day 3 SBP, however will hold off on further albumin given high PEEP/vent setting requirements     This note was generated by a resident and staffed w/ fellow and attending physician.  Basilia Alcazar, DO     Thank you for the consultation. Hepatology will continue to follow.     - During weekday hours of 7am- 5pm, please do not hesitate to contact me on [employer] Chat or page [phone] if there are any further questions   - After hours, on weekends, and on holidays, please page the on-call GI fellow at [phone]. Thank you.      Sherie Ramos MD  PGY4 Gastroenterology Fellow  [facility]

## 2024-04-12 PROBLEM — R65.21 SEPTIC SHOCK (MULTI): Status: ACTIVE | Noted: 2024-01-01

## 2024-04-12 PROBLEM — A41.9 SEPTIC SHOCK (MULTI): Status: ACTIVE | Noted: 2024-01-01

## 2024-04-12 NOTE — CARE PLAN
The patient's goals for the shift include        Problem: Pain  Goal: My pain/discomfort is manageable  Outcome: Progressing     Problem: Safety  Goal: Patient will be injury free during hospitalization  Outcome: Progressing  Goal: I will remain free of falls  Outcome: Progressing     Problem: Daily Care  Goal: Daily care needs are met  Outcome: Progressing     Problem: Psychosocial Needs  Goal: Demonstrates ability to cope with hospitalization/illness  Outcome: Progressing  Goal: Collaborate with me, my family, and caregiver to identify my specific goals  Outcome: Progressing     Problem: Discharge Barriers  Goal: My discharge needs are met  Outcome: Progressing     Problem: Safety - Medical Restraint  Goal: Remains free of injury from restraints (Restraint for Interference with Medical Device)  Outcome: Progressing  Goal: Free from restraint(s) (Restraint for Interference with Medical Device)  Outcome: Progressing     Problem: Skin  Goal: Decreased wound size/increased tissue granulation at next dressing change  Outcome: Progressing  Flowsheets (Taken 4/12/2024 1352)  Decreased wound size/increased tissue granulation at next dressing change: Utilize specialty bed per algorithm  Goal: Participates in plan/prevention/treatment measures  Outcome: Progressing  Flowsheets (Taken 4/12/2024 1352)  Participates in plan/prevention/treatment measures: Elevate heels  Goal: Prevent/manage excess moisture  Outcome: Progressing  Flowsheets (Taken 4/12/2024 1352)  Prevent/manage excess moisture:   Follow provider orders for dressing changes   Monitor for/manage infection if present  Goal: Prevent/minimize sheer/friction injuries  Outcome: Progressing  Flowsheets (Taken 4/12/2024 1352)  Prevent/minimize sheer/friction injuries:   Complete micro-shifts as needed if patient unable. Adjust patient position to relieve pressure points, not a full turn   HOB 30 degrees or less   Turn/reposition every 2 hours/use positioning/transfer  devices   Use pull sheet  Goal: Promote/optimize nutrition  Outcome: Progressing  Flowsheets (Taken 4/12/2024 1352)  Promote/optimize nutrition: Monitor/record intake including meals  Goal: Promote skin healing  Outcome: Progressing  Flowsheets (Taken 4/12/2024 1352)  Promote skin healing:   Turn/reposition every 2 hours/use positioning/transfer devices   Protective dressings over bony prominences     Problem: Knowledge Deficit  Goal: Patient/family/caregiver demonstrates understanding of disease process, treatment plan, medications, and discharge instructions  Outcome: Progressing     Problem: Mechanical Ventilation  Goal: Patient Will Maintain Patent Airway  Outcome: Progressing  Goal: Oral health is maintained or improved  Outcome: Progressing  Goal: Tracheostomy will be managed safely  Outcome: Progressing  Goal: ET tube will be managed safely  Outcome: Progressing  Goal: Ability to express needs and understand communication  Outcome: Progressing  Goal: Mobility/activity is maintained at optimum level for patient  Outcome: Progressing     Problem: Pain - Adult  Goal: Verbalizes/displays adequate comfort level or baseline comfort level  Outcome: Progressing     Problem: Safety - Adult  Goal: Free from fall injury  Outcome: Progressing     Problem: Discharge Planning  Goal: Discharge to home or other facility with appropriate resources  Outcome: Progressing     Problem: Chronic Conditions and Co-morbidities  Goal: Patient's chronic conditions and co-morbidity symptoms are monitored and maintained or improved  Outcome: Progressing

## 2024-04-12 NOTE — PROGRESS NOTES
Daily Progress Note    Vera Beard is a 43 y.o. female admitted on 4/9/2024 10:55 AM for possible HRS and HD management. Pt is hyponatremic and acidemic.     Overnight:  No acute events reported    Subjective   Pt was seen at the bedside. Still sedated and intubated. Still requiring 3x pressor support.     Objective   Vitals: I/O:   Vitals:    04/12/24 1000   BP:    Pulse: 75   Resp: 18   Temp: 35.7 °C (96.3 °F)   SpO2: 97%        24hr Min/Max:  Temp  Min: 34.7 °C (94.5 °F)  Max: 36.3 °C (97.3 °F)  Pulse  Min: 68  Max: 100  BP  Min: 91/53  Max: 116/61  Resp  Min: 14  Max: 20  SpO2  Min: 82 %  Max: 98 %   Intake/Output Summary (Last 24 hours) at 4/12/2024 1004  Last data filed at 4/12/2024 0900  Gross per 24 hour   Intake 2291.43 ml   Output 2013 ml   Net 278.43 ml        Net IO Since Admission: 7,999.29 mL [04/12/24 1004]      Physical Examination:  General: Intubated, sedated; does not withdraw to pain   HEENT:  L conjunctival hemorrhage, Broken teeth, blood   Skin: No suspect lesions or rashes noted on visible skin  Chest: On mechanical ventilation  Cardiac: Regular rate and rhythm, normal s1, s2, no M/R/G, no JVD  Abdomen: Soft, ND, NT, no involuntary guarding  : No flank pain, indwelling urinary catheter in place  EXT: b/l LE +2 edema     Labs:  CBC RFP   Lab Results   Component Value Date    WBC 28.6 (H) 04/12/2024    HGB 6.7 (L) 04/12/2024    HCT 18.7 (L) 04/12/2024    MCV 87 04/12/2024    PLT 37 (LL) 04/12/2024    NEUTROABS 8.26 (H) 04/09/2024    Lab Results   Component Value Date     (L) 04/12/2024    K 4.2 04/12/2024    CL 96 (L) 04/12/2024    CO2 17 (L) 04/12/2024    BUN 7 04/12/2024    CREATININE 1.64 (H) 04/12/2024    CREATININE 2.15 (H) 04/11/2024     Lab Results   Component Value Date    MG 2.40 04/12/2024    PHOS 2.7 04/12/2024    CALCIUM 7.8 (L) 04/12/2024         Hepatic Function ABG/VBG   Lab Results   Component Value Date    ALT 21 04/12/2024    AST 65 (H) 04/12/2024    ALKPHOS 87  04/12/2024     Lab Results   Component Value Date    BILIDIR 2.3 (H) 04/12/2024      Lab Results   Component Value Date    PROTIME 38.1 (H) 04/12/2024    APTT 101 (HH) 04/12/2024    INR 3.3 (H) 04/12/2024    Lab Results   Component Value Date    LACTATE 5.5 (HH) 04/11/2024        Results from last 7 days   Lab Units 04/12/24  0317 04/11/24  1608 04/11/24  0200 04/09/24  1730 04/09/24  1105   WBC AUTO x10*3/uL 28.6* 25.6* 21.5*   < > 10.7   HEMOGLOBIN g/dL 6.7* 7.2* 7.5*   < > 6.3*   HEMATOCRIT % 18.7* 20.0* 20.2*   < > 17.2*   PLATELETS AUTO x10*3/uL 37* 47* 59*   < > 74*   NEUTROS PCT AUTO %  --   --   --   --  77.0   LYMPHO PCT MAN % 0.0 1.6 15.0   < >  --    LYMPHS PCT AUTO %  --   --   --   --  12.8   MONO PCT MAN % 2.5 0.8 52.0   < >  --    MONOS PCT AUTO %  --   --   --   --  9.1   EOSINO PCT MAN % 0.0 0.0 1.0   < >  --    EOS PCT AUTO %  --   --   --   --  0.3    < > = values in this interval not displayed.     Results from last 7 days   Lab Units 04/12/24  0317 04/11/24  1753 04/11/24  1244 04/11/24  0200 04/10/24  1759   SODIUM mmol/L 129* 129* 127*  128* 127*  128* 128*   POTASSIUM mmol/L 4.2 4.7 4.2  4.3 3.7  3.7 4.0   CHLORIDE mmol/L 96* 97* 96*  98 98  97* 97*   CO2 mmol/L 17* 19* 18*  18* 16*  17* 17*   BUN mg/dL 7 8 9  9 12  12 15   CREATININE mg/dL 1.64* 2.15* 2.23*  2.26* 2.80*  2.87* 3.62*   CALCIUM mg/dL 7.8* 7.6* 7.5* 7.3* 7.4*   PROTEIN TOTAL g/dL 5.4*  --   --  5.4* 5.5*   BILIRUBIN TOTAL mg/dL 3.6*  --   --  2.6* 2.4*   ALK PHOS U/L 87  --   --  72 81   ALT U/L 21  --   --  14 13   AST U/L 65*  --   --  51* 44*   GLUCOSE mg/dL 175* 158* 163* 194* 240*     Results from last 7 days   Lab Units 04/12/24  0317 04/11/24  1753 04/11/24  1244   MAGNESIUM mg/dL 2.40 2.32 2.29     Results from last 7 days   Lab Units 04/12/24  0749 04/12/24  0323 04/12/24  0317 04/12/24  0015 04/11/24  1936 04/11/24  1753 04/11/24  1615 04/11/24  1244 04/11/24  0213 04/11/24  0200 04/10/24  1804  04/10/24  1759   POCT GLUCOSE mg/dL 157* 182*  --  170* 167*  --  164*  --    < >  --    < >  --    GLUCOSE mg/dL  --   --  175*  --   --  158*  --  163*  --  194*  --  240*    < > = values in this interval not displayed.     ABG 4/11 ~8PM:  Blood Gas Arterial Full Panel   Result Value Ref Range    POCT pH, Arterial 7.33 (L) 7.38 - 7.42 pH    POCT pCO2, Arterial 33 (L) 38 - 42 mm Hg    POCT pO2, Arterial 78 (L) 85 - 95 mm Hg    POCT SO2, Arterial 97 94 - 100 %    POCT Oxy Hemoglobin, Arterial 94.6 94.0 - 98.0 %    POCT Hematocrit Calculated, Arterial 22.0 (L) 36.0 - 46.0 %    POCT Sodium, Arterial 126 (L) 136 - 145 mmol/L    POCT Potassium, Arterial 4.5 3.5 - 5.3 mmol/L    POCT Chloride, Arterial 99 98 - 107 mmol/L    POCT Ionized Calcium, Arterial 1.03 (L) 1.10 - 1.33 mmol/L    POCT Glucose, Arterial 162 (H) 74 - 99 mg/dL    POCT Lactate, Arterial 5.6 (HH) 0.4 - 2.0 mmol/L    POCT Base Excess, Arterial -7.8 (L) -2.0 - 3.0 mmol/L    POCT HCO3 Calculated, Arterial 17.4 (L) 22.0 - 26.0 mmol/L    POCT Hemoglobin, Arterial 7.4 (L) 12.0 - 16.0 g/dL    POCT Anion Gap, Arterial 14 10 - 25 mmo/L    Patient Temperature 37.0 degrees Celsius    FiO2 60 %     Imaging:  XR abdomen 1 view  Result Date: 4/12/2024  1. Increased gaseous distention of multiple loops of small and large bowel. No definite evidence of free intra-abdominal air, however evaluation is limited due to patient positioning and portable technique. 2. Partially visualized patchy opacities of the bilateral lung bases.   I personally reviewed the image(s)/study and resident interpretation as stated by Dr. Lea Mccray MD. I agree with the findings as stated. This study was interpreted at [facility], [place], [place].   MACRO: None   Signed by: Reina Hanna 4/12/2024 8:04 AM Dictation workstation:   [id number]    XR chest 1 view  Result Date: 4/11/2024    1.  Interval increase in left-greater-than-right airspace opacities.  Consider pulmonary edema, multifocal infection, pulmonary hemorrhage or ARDS.   2. Medical devices as above.           XR chest 1 view  Result Date: 4/10/2024  1. Multifocal consolidative opacities with increased interstitial markings. Findings may be due to severe edema or multifocal infection. Aeration of the right upper lung is improved compared to prior study.   2. Small left pleural effusion.   3. Medical devices as described            US liver with doppler  Result Date: 4/10/2024  1. Hepatic cirrhosis with large volume ascites. Limited evaluation of the portal venous system. 2. Cholelithiasis without acute cholecystitis.       CT chest abdomen pelvis wo IV contrast  Result Date: 4/9/2024  Chest   1. Endotracheal tube in place with the tip terminating at the level of the faby/right mainstem bronchus. Recommend retracting tube.   2. Extensive consolidation/ground-glass opacities throughout both lungs consistent with multifocal pneumonia.   3. Small bilateral pleural effusions.   4. Small pericardial effusion.      Abdomen-Pelvis   1. Large volume ascites.   2. Severe diffuse hepatic steatosis.   3. Cholelithiasis.   4. Severe body wall anasarca.        Kidneys Ureter:   Bilateral kidneys are mildly atrophic.   Punctate 0.3 cm calculus at the right upper pole.   No hydronephrosis.       Medications  Scheduled Medications:  PRN Medications:    Scheduled medications  bisacodyl, 10 mg, rectal, Daily  bisacodyl, 10 mg, rectal, Once  folic acid, 1 mg, oral, Daily  hydrocortisone sodium succinate, 50 mg, intravenous, q6h  insulin lispro, 0-10 Units, subcutaneous, q4h  levothyroxine, 88 mcg, oral, Daily  meropenem, 2 g, intravenous, q12h  micafungin, 100 mg, intravenous, q24h  pantoprazole, 40 mg, intravenous, BID  polyethylene glycol, 17 g, oral, BID  rifAXIMin, 550 mg, oral, q12h TEZ  tobramycin, 7 mg/kg (Adjusted), intravenous, q24h  vancomycin, 500 mg, intravenous, q12h      Continuous medications  angiotensin II  (Giapreza) 2.5 mg in sodium chloride 0.9% 250 mL (0.01 mg/mL) infusion, 1.25-80 ng/kg/min  angiotensin II (Giapreza) 2.5 mg in sodium chloride 0.9% 250 mL (0.01 mg/mL) infusion, 1.25-40 ng/kg/min  EPINEPHrine, 0-2 mcg/kg/min (Order-Specific)  EPINEPHrine, 0-2 mcg/kg/min, Last Rate: 0.07 mcg/kg/min (04/12/24 0825)  fentaNYL,  mcg/hr, Last Rate: 25 mcg/hr (04/12/24 0400)  norepinephrine, 0-3 mcg/kg/min (Order-Specific), Last Rate: 0.4 mcg/kg/min (04/12/24 0825)  oxygen,   PrismaSol 4/2.5, 2,100 mL/hr, Last Rate: 2,100 mL/hr (04/11/24 1939)  propofol, 5-50 mcg/kg/min, Last Rate: 15 mcg/kg/min (04/12/24 0800)  vasopressin, 0.03 Units/min, Last Rate: 0.03 Units/min (04/12/24 0719)     PRN medications  PRN medications: dextrose, dextrose, fentaNYL, glucagon, insulin regular, ketamine, oxygen, rocuronium, vancomycin         Assessment:  Ms. Vera Beard is a 43 y.o. female w/ a PMH of Alcohol-induced Liver Cirrhosis, Chronic pancreatitis, Hx of UGIB (no banding done), Hepatic Encephalopathy (on home lactulose, some non-compliance) that that presented to the ED for Lethargy and AMS, and then seen to be in shock, likely multifactorial from sepsis (MF PNA and SBP), hemorrhagic (Hgb 6.3, hemoptysis) and now intubated in the MICU requiring pressor support.      #KEITH, oliguric, Stage 2  ::Bl Cr 0.7-0.8  ::Current Cr improved w/ continued diuresis 2.8 ->1.64 ; oliguric to ~300ccs  Etiology:  Likely multifactorial iso of septic/hemorrhagic shock, Utox+ for cocaine; can compromise renal perfusion. She has significant 3rd spacing which may also affect preload and global perfusion as well. Contrast use, Vanc/Zosyn; are nephrotoxic and can cause direct renal injury. Pt is now on Vanc/Meropenem/Azithro given Respiratory Cx smear positive for Pseudomonas.      #HRS consideration  :: Current KEITH has multiple likely causes other than HRS     #Acidosis, metabolic acidosis (AGMA, NAGMA), resolved   ::Intake ABG 7.20, Bicarb 11, AG 15,  d-d gap 5, PCO2 26 (appropriately compensated)  ::Glucose on intake was 39, LA 4, K+ is wnl  ::NAGMA likely related to GI losses from Lactulose  ::Last ABG 4/11 pH 7.32  ::Lactic acid stable at 5.6  - Currently on CVVH.      #Acute moderate Hyponatremia, hypervolemic  ::Na 129 4/12  ::Likely etiology 2/2 Liver cirrhosis  - On CVVH now and improving with diuresis     Recommendations:  Pt is HD unstable and will require supportive management for now  - Avoid Nephrotoxins, contrast if possible  - Strict Is/Os  - Renal dosing for medications for latest eGFR, follow medication trough as appropriate  - Avoid hypotension/rapid fluctuations in BPs; Keep SBP >100, MAPs >65  - Na levels improving, will CTM  - Currently on CVVH - plan to continue      - Nephrology will continue to follow and manage RRT. Daily decision for modality.      Assessment and Plan discussed with Attending Nephrologist, Dr. Garcia.      Jose Antonio Lal MD  Prelim PGY-1 Internal Medicine   Nephrology Pager # [phone]

## 2024-04-12 NOTE — PROGRESS NOTES
SOCIAL WORK NOTE   Per team, ongoing GOC discussion. Family coming from out of town, anticipate transitioning to comfort care. Social work to follow.  Nataliia Gore, JENNIFER, LISW-S ([id number])

## 2024-04-12 NOTE — CARE PLAN
Problem: Safety  Goal: Patient will be injury free during hospitalization  Outcome: Progressing  Goal: I will remain free of falls  Outcome: Progressing     Problem: Safety - Medical Restraint  Goal: Remains free of injury from restraints (Restraint for Interference with Medical Device)  Outcome: Progressing     Problem: Skin  Goal: Prevent/manage excess moisture  Outcome: Progressing   The patient's goals for the shift include      The clinical goals for the shift include patient maintains map >65    Over the shift, the patient did not make progress toward the following goals. Barriers to progression include critical illness. Recommendations to address these barriers include orders per MD.

## 2024-04-12 NOTE — PROGRESS NOTES
"[facility]  [facility]  CONSULT FOLLOW-UP     Reason For Consult  etoh cirrhosis, hx varices (no banding), admitted for HE and septic shock 2/2 pneumonia +SBP    History Of Present Illness  Vera Beard is a 43 y.o. female with a past medical history of chronic pancreatitis c/b splenic vein thrombosis, alcohol induced hepatic cirrhosis with prior upper GI bleed (no banding) and hepatic encephalopathy (hx intermittently refusing lactulose) admitted on 4/9/2024 with lethargy and AMS as well as ED reports of hematemesis in ED. Hepatology is consulted for above. Pt remains intubated and sedated and triple pressors, on broad spectrum abx for pseudomonas pneumonia + SBP. Nephro is following for oliguric KEITH. Recent admission to [facility] MICU for hemorrhagic shock s/p MTP 2/29-3/9/24, course c/b PEA arrest w/ ROSC after 2 rounds and pt left AMA to home.     SUBJECTIVE     Remains critically ill, triple pressor support (epi, concentrated levo, vaso), now adding giapreza and mechanical ventilation (Fi02/ PEEP requirements remains very high and does continue to desaturate). Uptrending lactate and remains in DIC s/p cryo, 1 unit PRBC. Hgb downtrending to 6.7. No urine made yesterday, ~1L CVVH fluid removal. Abx w/ vanc, merrem, tobra, micafungin    EXAM     Last Recorded Vitals  Blood pressure 91/53, pulse 90, temperature 36.3 °C (97.3 °F), temperature source Temporal, resp. rate 18, height 1.651 m (5' 5\"), weight 77.3 kg (170 lb 6.7 oz), SpO2 97%.      Intake/Output Summary (Last 24 hours) at 4/12/2024 0809  Last data filed at 4/12/2024 0730  Gross per 24 hour   Intake 2722.27 ml   Output 1819 ml   Net 903.27 ml          Physical Exam  GENERAL: Intubated, sedated. Acutely ill, appears older than stated age  EYES: L conjunctival hemorrhage  HENT: Multiple broken teeth, + scleral icterus. oozing blood around mouth and catheter/IV sites is mild, likely recently cleaned  LUNGS: Diffuse " rhonchi anteriorly, mechanical breath sounds  CV: +systolic  murmur. Not tachycardic  ABDOMEN: hypoactive bowel sounds, moderate distension  -EXTREMITIES: +1 pitting edema BL LE  SKIN: Extremities warm.  NEUROLOGIC: Intubated, sedated.       OBJECTIVE                                                                              Medications             Current Facility-Administered Medications:     bisacodyl (Dulcolax) suppository 10 mg, 10 mg, rectal, Daily, Biju Ford MD, 10 mg at 04/11/24 2057    bisacodyl (Dulcolax) suppository 10 mg, 10 mg, rectal, Once, Tom Smith MD    dextrose 50 % injection 12.5 g, 12.5 g, intravenous, q15 min PRN, Woodrow Cage MD    dextrose 50 % injection 25 g, 25 g, intravenous, q15 min PRN, Woodrow Cage MD    EPINEPHrine (Adrenalin) 4 mg in dextrose 5% 250 mL (16 mcg/mL) infusion (premix), 0-2 mcg/kg/min, intravenous, Continuous, Biju Ford MD, Last Rate: 15 mL/hr at 04/12/24 0400, 0.056 mcg/kg/min at 04/12/24 0400    fentanyl (Sublimaze) 1000 mcg in sodium chloride 0.9% 100 mL (10 mcg/mL) infusion (premix),  mcg/hr, intravenous, Continuous, Iona Dalal MD, Last Rate: 2.5 mL/hr at 04/12/24 0400, 25 mcg/hr at 04/12/24 0400    fentaNYL PF (Sublimaze) injection 50 mcg, 50 mcg, intravenous, q2h PRN, Jayden Rojas DO    folic acid (Folvite) tablet 1 mg, 1 mg, oral, Daily, Sarah Araujo MD, 1 mg at 04/11/24 0843    glucagon (Glucagen) injection 1 mg, 1 mg, intramuscular, q15 min PRN, Woodrow Cage MD    hydrocortisone sod succ (PF) (Solu-CORTEF) injection 50 mg, 50 mg, intravenous, q6h, Sarah Araujo MD, 50 mg at 04/12/24 0419    insulin lispro (HumaLOG) injection 0-10 Units, 0-10 Units, subcutaneous, TID with meals, Iona Dalal MD, 2 Units at 04/12/24 0327    insulin regular (HumuLIN, NovoLIN) bolus from bag 2-6 Units, 2-6 Units, intravenous, PRN, Woodrow Cage MD    levothyroxine (Synthroid, Levoxyl) tablet 88 mcg, 88 mcg, oral, Daily, Tiffany PEREZ  MD Romeo, 88 mcg at 04/12/24 0418    micafungin (Mycamine) 100 mg in dextrose 5 % in water (D5W) 100 mL IV, 100 mg, intravenous, q24h, Carmen Cordova MD, Stopped at 04/12/24 0100    norepinephrine (Levophed) 16 mg in dextrose 5 % in water (D5W) 250 mL (0.064 mg/mL) infusion, 0-3 mcg/kg/min (Order-Specific), intravenous, Continuous, Iona Dalal MD, Last Rate: 25.5 mL/hr at 04/12/24 0400, 0.34 mcg/kg/min at 04/12/24 0400    oxygen (O2) therapy, , inhalation, Continuous PRN, Cheyenne Suazo MD    pantoprazole (ProtoNix) injection 40 mg, 40 mg, intravenous, BID, Woodrow Cage MD, 40 mg at 04/11/24 2007    piperacillin-tazobactam-dextrose (Zosyn) IV 3.375 g, 3.375 g, intravenous, q6h, Woodrow Cage MD, Stopped at 04/12/24 0600    polyethylene glycol (Glycolax, Miralax) packet 17 g, 17 g, oral, BID, Carmen Cordova MD    PrismaSol 4/2.5 CRRT solution, 2,100 mL/hr, CRRT, Continuous, Myles Lambert MD, Last Rate: 2,100 mL/hr at 04/11/24 1939, 2,100 mL/hr at 04/11/24 1939    propofol (Diprivan) infusion, 5-50 mcg/kg/min, intravenous, Continuous, Iona Daall MD, Last Rate: 6.47 mL/hr at 04/12/24 0800, 15 mcg/kg/min at 04/12/24 0800    rifAXIMin (Xifaxan) tablet 550 mg, 550 mg, oral, q12h TEZ, Sarah Araujo MD, 550 mg at 04/11/24 0843    vancomycin (Vancocin) pharmacy to dose - pharmacy monitoring, , miscellaneous, Daily PRN, Sarah Araujo MD    vancomycin in dextrose 5 % (Vancocin) IVPB 500 mg, 500 mg, intravenous, q12h, Rachelle Minor MD    vasopressin (Vasostrict) 0.2 unit/mL infusion, 0.03 Units/min, intravenous, Continuous, Sarah Araujo MD, Last Rate: 9 mL/hr at 04/12/24 0719, 0.03 Units/min at 04/12/24 0719                                                                            Labs     Results for orders placed or performed during the hospital encounter of 04/09/24 (from the past 24 hour(s))   Blood Gas Arterial Full Panel   Result Value Ref Range    POCT pH, Arterial 7.36 (L)  7.38 - 7.42 pH    POCT pCO2, Arterial 32 (L) 38 - 42 mm Hg    POCT pO2, Arterial 91 85 - 95 mm Hg    POCT SO2, Arterial 99 94 - 100 %    POCT Oxy Hemoglobin, Arterial 97.2 94.0 - 98.0 %    POCT Hematocrit Calculated, Arterial 23.0 (L) 36.0 - 46.0 %    POCT Sodium, Arterial 127 (L) 136 - 145 mmol/L    POCT Potassium, Arterial 3.9 3.5 - 5.3 mmol/L    POCT Chloride, Arterial 98 98 - 107 mmol/L    POCT Ionized Calcium, Arterial 1.01 (L) 1.10 - 1.33 mmol/L    POCT Glucose, Arterial 149 (H) 74 - 99 mg/dL    POCT Lactate, Arterial 4.2 (HH) 0.4 - 2.0 mmol/L    POCT Base Excess, Arterial -6.7 (L) -2.0 - 3.0 mmol/L    POCT HCO3 Calculated, Arterial 18.1 (L) 22.0 - 26.0 mmol/L    POCT Hemoglobin, Arterial 7.5 (L) 12.0 - 16.0 g/dL    POCT Anion Gap, Arterial 15 10 - 25 mmo/L    Patient Temperature 37.0 degrees Celsius    FiO2 60 %   Factor 8 Activity   Result Value Ref Range    Factor VIII Activity 178 55 - 180 %   POCT GLUCOSE   Result Value Ref Range    POCT Glucose 186 (H) 74 - 99 mg/dL   BUN   Result Value Ref Range    Urea Nitrogen 9 6 - 23 mg/dL   Creatinine, Serum   Result Value Ref Range    Creatinine 2.23 (H) 0.50 - 1.05 mg/dL    eGFR 27 (L) >60 mL/min/1.73m*2   Electrolyte panel   Result Value Ref Range    Sodium 127 (L) 136 - 145 mmol/L    Potassium 4.2 3.5 - 5.3 mmol/L    Chloride 96 (L) 98 - 107 mmol/L    Bicarbonate 18 (L) 21 - 32 mmol/L    Anion Gap 17 10 - 20 mmol/L   Calcium, ionized   Result Value Ref Range    POCT Calcium, Ionized 0.96 (L) 1.1 - 1.33 mmol/L   Phosphorus   Result Value Ref Range    Phosphorus 3.2 2.5 - 4.9 mg/dL   Renal Function Panel   Result Value Ref Range    Glucose 163 (H) 74 - 99 mg/dL    Sodium 128 (L) 136 - 145 mmol/L    Potassium 4.3 3.5 - 5.3 mmol/L    Chloride 98 98 - 107 mmol/L    Bicarbonate 18 (L) 21 - 32 mmol/L    Anion Gap 16 10 - 20 mmol/L    Urea Nitrogen 9 6 - 23 mg/dL    Creatinine 2.26 (H) 0.50 - 1.05 mg/dL    eGFR 27 (L) >60 mL/min/1.73m*2    Calcium 7.5 (L) 8.6 - 10.6  mg/dL    Phosphorus 3.3 2.5 - 4.9 mg/dL    Albumin 2.5 (L) 3.4 - 5.0 g/dL   Magnesium   Result Value Ref Range    Magnesium 2.29 1.60 - 2.40 mg/dL   Blood Gas Arterial Full Panel   Result Value Ref Range    POCT pH, Arterial 7.33 (L) 7.38 - 7.42 pH    POCT pCO2, Arterial 33 (L) 38 - 42 mm Hg    POCT pO2, Arterial 78 (L) 85 - 95 mm Hg    POCT SO2, Arterial 97 94 - 100 %    POCT Oxy Hemoglobin, Arterial 94.6 94.0 - 98.0 %    POCT Hematocrit Calculated, Arterial 22.0 (L) 36.0 - 46.0 %    POCT Sodium, Arterial 126 (L) 136 - 145 mmol/L    POCT Potassium, Arterial 4.5 3.5 - 5.3 mmol/L    POCT Chloride, Arterial 99 98 - 107 mmol/L    POCT Ionized Calcium, Arterial 1.03 (L) 1.10 - 1.33 mmol/L    POCT Glucose, Arterial 162 (H) 74 - 99 mg/dL    POCT Lactate, Arterial 5.6 (HH) 0.4 - 2.0 mmol/L    POCT Base Excess, Arterial -7.8 (L) -2.0 - 3.0 mmol/L    POCT HCO3 Calculated, Arterial 17.4 (L) 22.0 - 26.0 mmol/L    POCT Hemoglobin, Arterial 7.4 (L) 12.0 - 16.0 g/dL    POCT Anion Gap, Arterial 14 10 - 25 mmo/L    Patient Temperature 37.0 degrees Celsius    FiO2 60 %   CBC and Auto Differential   Result Value Ref Range    WBC 25.6 (H) 4.4 - 11.3 x10*3/uL    nRBC 0.0 0.0 - 0.0 /100 WBCs    RBC 2.32 (L) 4.00 - 5.20 x10*6/uL    Hemoglobin 7.2 (L) 12.0 - 16.0 g/dL    Hematocrit 20.0 (L) 36.0 - 46.0 %    MCV 86 80 - 100 fL    MCH 31.0 26.0 - 34.0 pg    MCHC 36.0 32.0 - 36.0 g/dL    RDW 19.5 (H) 11.5 - 14.5 %    Platelets 47 (L) 150 - 450 x10*3/uL    Immature Granulocytes %, Automated 0.8 0.0 - 0.9 %    Immature Granulocytes Absolute, Automated 0.21 0.00 - 0.70 x10*3/uL   Manual Differential   Result Value Ref Range    Neutrophils %, Manual 74.0 40.0 - 80.0 %    Bands %, Manual 20.5 0.0 - 5.0 %    Lymphocytes %, Manual 1.6 13.0 - 44.0 %    Monocytes %, Manual 0.8 2.0 - 10.0 %    Eosinophils %, Manual 0.0 0.0 - 6.0 %    Basophils %, Manual 0.0 0.0 - 2.0 %    Myelocytes %, Manual 3.1 0.0 - 0.0 %    Seg Neutrophils Absolute, Manual 18.94  (H) 1.20 - 7.00 x10*3/uL    Bands Absolute, Manual 5.25 (H) 0.00 - 0.70 x10*3/uL    Lymphocytes Absolute, Manual 0.41 (L) 1.20 - 4.80 x10*3/uL    Monocytes Absolute, Manual 0.20 0.10 - 1.00 x10*3/uL    Eosinophils Absolute, Manual 0.00 0.00 - 0.70 x10*3/uL    Basophils Absolute, Manual 0.00 0.00 - 0.10 x10*3/uL    Myelocytes Absolute, Manual 0.79 0.00 - 0.00 x10*3/uL    Total Cells Counted 127     Neutrophils Absolute, Manual 24.19 (H) 1.20 - 7.70 x10*3/uL    RBC Morphology See Below     Flor Cells Many     Acanthocytes Few    POCT GLUCOSE   Result Value Ref Range    POCT Glucose 164 (H) 74 - 99 mg/dL   Renal Function Panel   Result Value Ref Range    Glucose 158 (H) 74 - 99 mg/dL    Sodium 129 (L) 136 - 145 mmol/L    Potassium 4.7 3.5 - 5.3 mmol/L    Chloride 97 (L) 98 - 107 mmol/L    Bicarbonate 19 (L) 21 - 32 mmol/L    Anion Gap 18 10 - 20 mmol/L    Urea Nitrogen 8 6 - 23 mg/dL    Creatinine 2.15 (H) 0.50 - 1.05 mg/dL    eGFR 28 (L) >60 mL/min/1.73m*2    Calcium 7.6 (L) 8.6 - 10.6 mg/dL    Phosphorus 3.0 2.5 - 4.9 mg/dL    Albumin 2.6 (L) 3.4 - 5.0 g/dL   Magnesium   Result Value Ref Range    Magnesium 2.32 1.60 - 2.40 mg/dL   Tobramycin   Result Value Ref Range    Tobramycin 1.4 SEE BELOW ug/mL   Vancomycin, Trough   Result Value Ref Range    Vancomycin, Trough 20.1 (HH) 5.0 - 20.0 ug/mL   POCT GLUCOSE   Result Value Ref Range    POCT Glucose 167 (H) 74 - 99 mg/dL   BLOOD GAS ARTERIAL FULL PANEL   Result Value Ref Range    POCT pH, Arterial 7.32 (L) 7.38 - 7.42 pH    POCT pCO2, Arterial 32 (L) 38 - 42 mm Hg    POCT pO2, Arterial 100 (H) 85 - 95 mm Hg    POCT SO2, Arterial 99 94 - 100 %    POCT Oxy Hemoglobin, Arterial 97.5 94.0 - 98.0 %    POCT Hematocrit Calculated, Arterial 21.0 (L) 36.0 - 46.0 %    POCT Sodium, Arterial 127 (L) 136 - 145 mmol/L    POCT Potassium, Arterial 4.5 3.5 - 5.3 mmol/L    POCT Chloride, Arterial 98 98 - 107 mmol/L    POCT Ionized Calcium, Arterial 1.05 (L) 1.10 - 1.33 mmol/L    POCT  Glucose, Arterial 174 (H) 74 - 99 mg/dL    POCT Lactate, Arterial 6.7 (HH) 0.4 - 2.0 mmol/L    POCT Base Excess, Arterial -8.8 (L) -2.0 - 3.0 mmol/L    POCT HCO3 Calculated, Arterial 16.5 (L) 22.0 - 26.0 mmol/L    POCT Hemoglobin, Arterial 7.0 (L) 12.0 - 16.0 g/dL    POCT Anion Gap, Arterial 17 10 - 25 mmo/L    Patient Temperature 37.0 degrees Celsius    FiO2 60 %   POCT GLUCOSE   Result Value Ref Range    POCT Glucose 170 (H) 74 - 99 mg/dL   CBC and Auto Differential   Result Value Ref Range    WBC 28.6 (H) 4.4 - 11.3 x10*3/uL    nRBC 0.1 (H) 0.0 - 0.0 /100 WBCs    RBC 2.16 (L) 4.00 - 5.20 x10*6/uL    Hemoglobin 6.7 (L) 12.0 - 16.0 g/dL    Hematocrit 18.7 (L) 36.0 - 46.0 %    MCV 87 80 - 100 fL    MCH 31.0 26.0 - 34.0 pg    MCHC 35.8 32.0 - 36.0 g/dL    RDW 19.2 (H) 11.5 - 14.5 %    Platelets 37 (LL) 150 - 450 x10*3/uL    Immature Granulocytes %, Automated 0.6 0.0 - 0.9 %    Immature Granulocytes Absolute, Automated 0.16 0.00 - 0.70 x10*3/uL   Hepatic function panel   Result Value Ref Range    Albumin 2.4 (L) 3.4 - 5.0 g/dL    Bilirubin, Total 3.6 (H) 0.0 - 1.2 mg/dL    Bilirubin, Direct 2.3 (H) 0.0 - 0.3 mg/dL    Alkaline Phosphatase 87 33 - 110 U/L    ALT 21 7 - 45 U/L    AST 65 (H) 9 - 39 U/L    Total Protein 5.4 (L) 6.4 - 8.2 g/dL   Calcium, ionized   Result Value Ref Range    POCT Calcium, Ionized 1.05 (L) 1.1 - 1.33 mmol/L   Renal Function Panel   Result Value Ref Range    Glucose 175 (H) 74 - 99 mg/dL    Sodium 129 (L) 136 - 145 mmol/L    Potassium 4.2 3.5 - 5.3 mmol/L    Chloride 96 (L) 98 - 107 mmol/L    Bicarbonate 17 (L) 21 - 32 mmol/L    Anion Gap 20 10 - 20 mmol/L    Urea Nitrogen 7 6 - 23 mg/dL    Creatinine 1.64 (H) 0.50 - 1.05 mg/dL    eGFR 39 (L) >60 mL/min/1.73m*2    Calcium 7.8 (L) 8.6 - 10.6 mg/dL    Phosphorus 2.7 2.5 - 4.9 mg/dL    Albumin 2.4 (L) 3.4 - 5.0 g/dL   Magnesium   Result Value Ref Range    Magnesium 2.40 1.60 - 2.40 mg/dL   Coagulation Screen   Result Value Ref Range    Protime  38.1 (H) 9.8 - 12.8 seconds    INR 3.3 (H) 0.9 - 1.1    aPTT 101 (HH) 27 - 38 seconds   Fibrinogen   Result Value Ref Range    Fibrinogen 55 (LL) 200 - 400 mg/dL   Manual Differential   Result Value Ref Range    Neutrophils %, Manual 97.5 40.0 - 80.0 %    Lymphocytes %, Manual 0.0 13.0 - 44.0 %    Monocytes %, Manual 2.5 2.0 - 10.0 %    Eosinophils %, Manual 0.0 0.0 - 6.0 %    Basophils %, Manual 0.0 0.0 - 2.0 %    Seg Neutrophils Absolute, Manual 27.89 (H) 1.20 - 7.00 x10*3/uL    Lymphocytes Absolute, Manual 0.00 (L) 1.20 - 4.80 x10*3/uL    Monocytes Absolute, Manual 0.72 0.10 - 1.00 x10*3/uL    Eosinophils Absolute, Manual 0.00 0.00 - 0.70 x10*3/uL    Basophils Absolute, Manual 0.00 0.00 - 0.10 x10*3/uL    Total Cells Counted 122     RBC Morphology See Below     Hypochromia Mild     Target Cells Few     Flor Cells Few    POCT GLUCOSE   Result Value Ref Range    POCT Glucose 182 (H) 74 - 99 mg/dL   Prepare RBC: 1 Units   Result Value Ref Range    PRODUCT CODE D7619Y37     Unit Number [id number]     Unit ABO A     Unit RH POS     XM INTEP COMP     Dispense Status TR     Blood Expiration Date April 30, 2024 23:59 EDT     PRODUCT BLOOD TYPE 6200     UNIT VOLUME 350    POCT GLUCOSE   Result Value Ref Range    POCT Glucose 157 (H) 74 - 99 mg/dL                                                                              Imaging:     XR chest 1 view    Result Date: 4/12/2024  Interpreted By:  Reina Hanna, STUDY: XR CHEST 1 VIEW;  4/12/2024 7:43 am   INDICATION: Signs/Symptoms:ARDS.   COMPARISON: 04/11/2024   ACCESSION NUMBER(S): [id number]   ORDERING CLINICIAN: VIKAS BARRY   FINDINGS: Multiple support devices similar to the prior exam including ET tube with tip approximately 5.3 cm above the faby, NG tube extending into the upper abdomen and bilateral jugular catheters. Numerous additional presumed overlying monitoring/support devices again seen.   Diffuse bilateral infiltrates again seen. There is likely new  slight blunting of the left costophrenic angle. No obvious pneumothorax. The cardiac silhouette is normal in size. Distended gas-filled loops of bowel in the visualized upper abdomen.       Support devices as above. Persistent diffuse infiltrates. Questionable developing left pleural effusion.   MACRO: None.   Signed by: Reina Hanna 4/12/2024 8:36 AM Dictation workstation:   [id number]    XR abdomen 1 view    Result Date: 4/12/2024  Interpreted By:  Yohann, Reina,  and Shazia Lea STUDY: XR ABDOMEN 1 VIEW;  4/11/2024 7:32 pm   INDICATION: Signs/Symptoms:Concern for perf.   COMPARISON: Abdominal radiograph 04/09/2024 and CT chest abdomen pelvis 04/29/2024   ACCESSION NUMBER(S): [id number]   ORDERING CLINICIAN: KWAKU VELA   FINDINGS: 2 portable views of the abdomen were obtained. The lower pelvis is not included in imaging field.   Stable positioning of an enteric tube with tip overlying the gastric body.   Increased gaseous distention of multiple loops of small and large bowel now measuring up to 7.3 cm in diameter in the right upper quadrant.   No evidence of free intra-abdominal air although evaluation is limited on semi-upright radiograph.   Multiple densities again seen overlying the stomach.   Similar patchy opacities of the bilateral lung bases, partially visualized.   Tubal ligation clips overlying the pelvis.   Osseous structures demonstrate no acute bony changes. Note is made of 6 lumbar-type vertebrae.       1. Increased gaseous distention of multiple loops of small and large bowel. No definite evidence of free intra-abdominal air, however evaluation is limited due to patient positioning and portable technique. 2. Partially visualized patchy opacities of the bilateral lung bases.   I personally reviewed the image(s)/study and resident interpretation as stated by Dr. Lea Mccray MD. I agree with the findings as stated. This study was interpreted at [facility]  [place], [place].   MACRO: None   Signed by: Reina Hanna 4/12/2024 8:04 AM Dictation workstation:   [id number]    Electrocardiogram, 12-lead PRN ACS symptoms    Result Date: 4/11/2024  Normal sinus rhythm Low voltage QRS Nonspecific T wave abnormality Abnormal ECG When compared with ECG of 09-APR-2024 11:04, Aberrant conduction is no longer Present Nonspecific T wave abnormality, improved in Lateral leads QT has lengthened Confirmed by Juan Perez ([id number]) on 4/11/2024 5:16:35 PM    XR chest 1 view    Result Date: 4/11/2024  Interpreted By:  Jayme Johnson, STUDY: XR CHEST 1 VIEW;  4/11/2024 7:58 am   INDICATION: Signs/Symptoms:ARDS.   COMPARISON: Exam dated 04/10/2024   ACCESSION NUMBER(S): [id number]   ORDERING CLINICIAN: VIKAS BARRY   FINDINGS: AP radiograph of the chest was provided.   Endotracheal tube tip projects 3.6 cm above the faby. Enteric tube projects over the esophagus and left upper quadrant, tip not visualized. Left internal jugular central venous catheter tip projects over the lower SVC. Right internal jugular central venous catheter tip projects over the superior cavoatrial junction.   CARDIOMEDIASTINAL SILHOUETTE: Cardiomediastinal silhouette is stable in size and configuration.   LUNGS: Interval increase in multifocal bilateral airspace opacities, left-greater-than-right. No large pleural effusion or evidence of pneumothorax.   ABDOMEN: No remarkable upper abdominal findings.   BONES: No acute osseous changes.       1.  Interval increase in left-greater-than-right airspace opacities. Consider pulmonary edema, multifocal infection, pulmonary hemorrhage or ARDS. 2. Medical devices as above.       MACRO: None   Signed by: Jayme Johnson 4/11/2024 8:48 AM Dictation workstation:   [id number]    XR chest 1 view    Result Date: 4/10/2024  Interpreted By:  Marylou Hugo and Nakamoto Kent STUDY: XR CHEST 1 VIEW;  4/10/2024 3:12 pm   INDICATION: Signs/Symptoms:confirm cvc.    COMPARISON: Same day chest radiograph 7:47 a.m.   ACCESSION NUMBER(S): [id number]   ORDERING CLINICIAN: VIKAS BARRY   FINDINGS: AP radiograph of the chest was provided. Radiograph is rotated to the right.   Endotracheal tube noted with tip projecting approximately 5.6 cm above the faby. Left IJ CVC with tip terminating at the cavoatrial junction. Enteric tube seen coursing below the level diaphragm with tip out of the field of view.   CARDIOMEDIASTINAL SILHOUETTE: Cardiomediastinal silhouette is stable in size and configuration.   LUNGS: No pneumothorax. There is similar multifocal consolidative opacities within the bilateral lungs. There is minimally worsened aeration of the left upper lobe. Bilateral costophrenic angles appear to be blunted.   ABDOMEN: No remarkable upper abdominal findings.   BONES: No acute osseous changes.       1. Redemonstrated multifocal hazy opacities within the bilateral lungs. There is decreased aeration of the left upper lobe compared to prior exam. These findings are again compatible with pulmonary alveolar edema or infective/inflammatory process. 2. Bilateral small pleural effusions. 3. Medical devices as described above.   I personally reviewed the images/study and I agree with the findings as stated by Kennedy Garcia MD. This study was interpreted at [facility], [place], [place].   MACRO: None   Signed by: Marylou Hugo 4/10/2024 3:22 PM Dictation workstation:   [id number]    CT chest abdomen pelvis wo IV contrast    Result Date: 4/10/2024  Interpreted By:  Maxime Wilkes and Fu Tianyuan STUDY: CT CHEST ABDOMEN PELVIS WO CONTRAST;  4/9/2024 2:13 pm   INDICATION: Signs/Symptoms:Sepsis. Presenting with altered mental status.   COMPARISON: None.   ACCESSION NUMBER(S): [id number]   ORDERING CLINICIAN: BESS SEGOVIA   TECHNIQUE: CT of the chest, abdomen and pelvis was performed. Contiguous axial images were obtained at 3 mm slice thickness  through the chest, abdomen and pelvis. Coronal and sagittal reconstructions at 3 mm slice thickness were performed. No intravenous contrast was administered.   FINDINGS: Please note that the study is limited without intravenous contrast.   CHEST:   LUNG/PLEURA/LARGE AIRWAYS: Endotracheal tube in place with the tip terminating at the level of the faby/right mainstem bronchus. Mild secretions in the distal trachea.   There are extensive confluent and patchy consolidations and ground-glass opacities throughout the bilateral lungs consistent with multifocal pneumonia. Small bilateral pleural effusions, right-greater-than-left. No pneumothorax.   VESSELS: Aorta and pulmonary arteries are normal caliber.  No significant atherosclerotic changes of the thoracic aorta. Mild coronary artery calcifications are present.   HEART: The heart is normal in size. Small pericardial effusion.   MEDIASTINUM AND CHARIS: No mediastinal, hilar, or axillary lymphadenopathy. The esophagus is within normal limits.   CHEST WALL AND LOWER NECK: Severe diffuse chest wall edema is present. The visualized thyroid gland appears within normal limits.   ABDOMEN:   LIVER: Normal size and smooth contour. Severe diffuse hepatic steatosis. No focal parenchymal abnormalities are seen.   BILE DUCTS: The intrahepatic and extrahepatic bile ducts are nondilated.   GALLBLADDER: Cholelithiasis. The gallbladder is nondistended.   PANCREAS: The pancreas is unremarkable without evidence of ductal dilation or masses.   SPLEEN: The spleen is normal in size and without evidence of focal lesions.   ADRENAL GLANDS: Within normal limits.   KIDNEYS AND URETERS: Bilateral kidneys are mildly atrophic. Punctate 0.3 cm calculus at the right upper pole. No hydronephrosis.   PELVIS:   BLADDER: The bladder is decompressed with Tadeo catheter in place.   REPRODUCTIVE ORGANS: The uterus is present. No suspicious pelvic masses.   BOWEL: The stomach is unremarkable.  The small and  large bowel are normal in caliber and demonstrate no wall thickening.  The appendix is not definitively visualized.   VESSELS: There is no aneurysmal dilatation of the abdominal aorta. The IVC is within normal limits. Mild atherosclerotic changes of the abdominal aorta and its branch vessels.   PERITONEUM/RETROPERITONEUM/LYMPH NODES: There is large volume ascites. No loculated fluid collections or intraperitoneal free air. No abdominopelvic lymphadenopathy is present.   ABDOMINAL WALL: Severe diffuse abdominal wall anasarca.   BONES: No acute osseous abnormality or suspicious osseous lesions.       Chest 1. Endotracheal tube in place with the tip terminating at the level of the origin of the right mainstem bronchus. 2. Extensive consolidation/ground-glass opacities throughout both lungs consistent with multifocal pneumonia. 3. Small bilateral pleural effusions. 4. Small pericardial effusion.   Abdomen-Pelvis 1. Large volume ascites. 2. Severe diffuse hepatic steatosis. 3. Cholelithiasis. 4. Severe body wall anasarca.   I personally reviewed the images/study and I agree with the findings as stated by resident physician Rosales Vernon MD. This study was interpreted at [place], [place].   The above information was relayed to Ordering provider Perri Watts by Rosales Vernon MD via epic haiku at 2:30 p.m. on 04/09/2024 with readback verification.   MACRO: None   Signed by: Maxime Wilkes 4/10/2024 12:20 PM Dictation workstation:   [id number]    XR chest 1 view    Result Date: 4/10/2024  Interpreted By:  Clary Mcmillan, STUDY: XR CHEST 1 VIEW; 4/10/2024 7:49 am   INDICATION: Signs/Symptoms:dyspnea.   COMPARISON: Radiograph dated 04/09/2024   ACCESSION NUMBER(S): [id number]   ORDERING CLINICIAN: VIKAS BARRY   FINDINGS: ET tube is terminating 3.9 cm from the faby. Enteric tube is in place with the tip outside the field of view. Right IJ central venous catheter is  projecting over right atrium.   The cardiac silhouette size is within normal limits.   Multifocal consolidation in bilateral lungs with slight improvement in the aeration of the right upper lung. Small left pleural effusion. No sizable pneumothorax.   No acute osseous change.       1. Multifocal consolidative opacities with increased interstitial markings. Findings may be due to severe edema or multifocal infection. Aeration of the right upper lung is improved compared to prior study. 2. Small left pleural effusion. 3. Medical devices as described       Signed by: Clary Green 4/10/2024 8:09 AM Dictation workstation:   [id number]    US liver with doppler    Result Date: 4/10/2024  Interpreted By:  Robert Lawler  and Katherine Nelson STUDY: US LIVER WITH DOPPLER;  4/10/2024 4:12 am   INDICATION: Signs/Symptoms:SBP, alcohol cirrhosis decompensated; with dopplers.   COMPARISON: CT cap 04/09/2024   ACCESSION NUMBER(S): [id number]   ORDERING CLINICIAN: FERNANDO GUIDRY   TECHNIQUE: Multiple images of the right upper quadrant were obtained.  Gray scale, color Doppler and spectral Doppler waveform analysis was performed.   FINDINGS: Examination is significantly limited secondary to patient body habitus and extensive ascites. Within the limitation:   LIVER: The liver is small, heterogeneous, with nodular contour measuring 13.9 cm in the craniocaudal dimension.   GALLBLADDER: Cholelithiasis is seen without gallbladder dilatation, wall thickening, or pericholecystic fluid. Sonographic Jackson's sign is negative.   BILIARY SYSTEM: No significant intrahepatic biliary ductal dilatation. The CBD was not seen.   DOPPLER EVALUATION:   HEPATIC ARTERIES: Poorly visualized.   PORTAL VEIN: Limited evaluation of the portal vein system and branches. The main portal vein is patent measures 0.8 cm with peak velocity of 19 cm/s.   HEPATIC VEIN: The right, middle and left hepatic veins are patent and demonstrate triphasic  antegrade flow. IVC appears also patent.   PANCREAS: The pancreas is poorly visualized due to overlying bowel gas.   RIGHT KIDNEY: The right kidney measures 7.9 cm in length. No overt hydronephrosis or renal calculi are seen.   SPLEEN: Poorly visualized.   PERITONEUM AND RETROPERITONEUM: Large volume ascites is seen.       Significantly limited examination.   1. Hepatic cirrhosis with large volume ascites. Limited evaluation of the portal venous system. 2. Cholelithiasis without acute cholecystitis.     I personally reviewed the images/study and I agree with Dr. Omar Murphy and the findings as stated.   MACRO: None   Signed by: Robert Lawler 4/10/2024 5:59 AM Dictation workstation:   [id number]    ECG 12 lead    Result Date: 4/10/2024  Sinus tachycardia with Premature atrial complexes with Aberrant conduction Low voltage QRS Nonspecific T wave abnormality Abnormal ECG No previous ECGs available See ED provider note for full interpretation and clinical correlation Confirmed by Cesar Bentley ([id number]) on 4/10/2024 2:36:33 AM    XR abdomen 1 view    Result Date: 4/9/2024  Interpreted By:  Omer Tsai, STUDY: XR ABDOMEN 1 VIEW;  4/9/2024 6:19 pm   INDICATION: Signs/Symptoms:OG placement.   COMPARISON: CT chest abdomen pelvis 04/09/2024.   ACCESSION NUMBER(S): [id number]   ORDERING CLINICIAN: JAIME HE   FINDINGS: Enteric tube courses midline below the level of the diaphragm with the tip projecting over the expected location of the gastric antrum.   Nonobstructive bowel gas pattern. There is centralization of visualized loops of large bowel consistent with large volume ascites and diffuse anasarca on CT from 04/09/2024. Limited evaluation of pneumoperitoneum on supine imaging, however no gross evidence of free air is noted.   Unchanged patchy alveolar opacities throughout the bilateral lower lungs compared to prior examination with small left pleural effusion.   Osseous structures demonstrate no acute  bony changes.       1.  Enteric tube with the distal tip projecting over the expected location of the gastric antrum. 2. Nonobstructive bowel gas pattern with centralization of visualized loops of large bowel consistent with previously observed large volume ascites and diffuse anasarca. 3. Unchanged patchy alveolar opacities and small left-sided pleural effusion better characterized on same day x-ray of the chest from 04/09/2024.   I personally reviewed the images/study and I agree with the findings as stated by resident Omer Tsai. This study was interpreted at [place], [place].   MACRO: None     Dictation workstation:   [id number]    XR chest 1 view    Result Date: 4/9/2024  Interpreted By:  Kosmas, Servando,  and Shazia Lea STUDY: XR CHEST 1 VIEW;  4/9/2024 3:35 pm   INDICATION: Signs/Symptoms:central line placement, RIJ.   COMPARISON: Same day chest radiograph time stamped 1:34 p.m.   ACCESSION NUMBER(S): [id number]   ORDERING CLINICIAN: RYAN GUERRERO   FINDINGS: Single AP view of the chest was provided.   Interval retraction of an endotracheal tube with tip now terminating 3.2 cm above the faby, in satisfactory positioning. Interval placement of a right internal jugular approach central venous catheter with tip overlying the superior cavoatrial junction.   CARDIOMEDIASTINAL SILHOUETTE: Similar obscuration of much of the cardiac border due to overlying pulmonary opacities.   LUNGS: Similar dense patchy alveolar opacities throughout the bilateral lungs, primarily in the right upper lobe. Probable small left pleural effusion. No pneumothorax.   ABDOMEN: Hyperdense material overlying the stomach. Mild dilation of multiple loops of bowel in the upper abdomen.   BONES: No acute osseous changes.       1. Satisfactory positioning of right internal jugular approach central venous catheter. 2. Unchanged appearance of patchy pulmonary opacities throughout  the bilateral lungs, particularly in the right upper lobe, with a probable small left pleural effusion. 3. Hyperdense material overlying the stomach with multiple dilated loops of bowel in the upper abdomen. Correlate with same day CT.   I personally reviewed the image(s)/study and resident interpretation as stated by Dr. Lea Mccray MD. I agree with the findings as stated. This study was interpreted at [facility], [place], [place].   MACRO: None   Signed by: Servando Sosa 4/9/2024 4:09 PM Dictation workstation:   [id number]    CT head wo IV contrast    Result Date: 4/9/2024  Interpreted By:  Bob Mitchell, STUDY: CT HEAD WO IV CONTRAST;  4/9/2024 2:08 pm   INDICATION: Signs/Symptoms:AMS.   COMPARISON: None.   ACCESSION NUMBER(S): [id number]   ORDERING CLINICIAN: BRITT JOHNSON   TECHNIQUE: Noncontrast axial CT scan of head was performed.   FINDINGS: Parenchyma: There is no intracranial hemorrhage. The grey-white differentiation is intact. There is no mass effect or midline shift. Patchy nonspecific white matter hypodensities involving the deep white matter of the bilateral cerebral hemispheres.   CSF Spaces: The ventricles, sulci and basal cisterns are within normal limits for age.   Extra-Axial Fluid: There is no extraaxial fluid collection.   Calvarium: The calvarium is unremarkable.   Paranasal sinuses: Visualized paranasal sinuses are clear.   Mastoids: Clear.   Orbits: Normal.   Soft tissues: Unremarkable.       No acute intracranial hemorrhage, mass effect, or CT apparent acute infarct.   Mild nonspecific patchy white matter hypodensity involving the bilateral cerebral hemispheres that may reflect age advanced chronic small vessel ischemic disease. MRI may be helpful for further characterization as clinically warranted.   MACRO: None   Signed by: Bob Mitchell 4/9/2024 2:30 PM Dictation workstation:   [id number]    XR chest 1 view    Result Date:  4/9/2024  Interpreted By:  Kosmas, Servando,  and Shazia Lea STUDY: XR CHEST 1 VIEW;  4/9/2024 1:43 pm   INDICATION: Signs/Symptoms:Intubation.   COMPARISON: Chest radiograph 04/09/2024 time stamped 11:53 a.m..   ACCESSION NUMBER(S): [id number]   ORDERING CLINICIAN: BESS SEGOVIA   FINDINGS: Single AP supine radiograph of the chest was provided.   Interval intubation with endotracheal tube tip terminating 0.6 cm above the faby.   CARDIOMEDIASTINAL SILHOUETTE: Much of the cardiomediastinal silhouette is obscured by adjacent pulmonary opacities.   LUNGS: Increasing patchy alveolar pulmonary opacities throughout the bilateral lungs, particularly worsening in the right upper lung field and adjacent to the right heart border. Probable small left pleural effusion. No pneumothorax.   ABDOMEN: No remarkable upper abdominal findings.   BONES: No acute osseous changes.       1. Interval intubation with endotracheal tube 0.6 cm above the faby, recommend slight retraction. 2. Worsening patchy alveolar opacities throughout the bilateral lungs, particularly in the right upper and middle lobes suspicious for multifocal pneumonia likely with a component of pulmonary edema.   I personally reviewed the image(s)/study and resident interpretation as stated by Dr. Lea Mccray MD. I agree with the findings as stated. This study was interpreted at [facility], [place], [place].   MACRO: None   Signed by: Servando Sosa 4/9/2024 2:19 PM Dictation workstation:   [id number]    XR chest 1 view    Result Date: 4/9/2024  Interpreted By:  Servando Sosa, STUDY: XR CHEST 1 VIEW;  4/9/2024 12:02 pm   INDICATION: Signs/Symptoms:Hypoxia.   COMPARISON: None.   ACCESSION NUMBER(S): [id number]   ORDERING CLINICIAN: BESS SEGOVIA   FINDINGS:         CARDIOMEDIASTINAL SILHOUETTE: Cardiomediastinal silhouette is normal in size and configuration.   LUNGS: There is extensive multifocal dense  airspace disease worse in the right upper lung and the bases bilaterally.   ABDOMEN: No remarkable upper abdominal findings.   BONES: No acute osseous changes.       Extensive multifocal dense airspace disease compatible with multifocal pneumonia. CT or radiographic follow-up to resolution is advised   MACRO: None   Signed by: Servando Sosa 4/9/2024 12:49 PM Dictation workstation:   [id number]                                                                            GI Procedures     DATE OF EXAM: Mar  8 2024  9:00AM    U   1233  -  US ABD LIVER VASCULAR  / ACCESSION #  [id number]   IMPRESSION:   Patent hepatic vasculature with appropriately directed flow.   Diffuse hepatic steatosis. Cholelithiasis.      Upper GI endoscopy   3/1/2024  Findings:       The examined esophagus was normal.        Type 1 isolated gastric varices (IGV1, varices located in the fundus)        with no bleeding were found in the gastric fundus. They were large in        largest diameter. EUS was performed and given large size and extent,        decision was made to perform endoscoic glue injection rather than EUS        guided coil injection. The regular GIF was inserted and gastric        varices were successfully injected with 1 mL cyanoacrylate for        eradication of varices.        A moderate post-ulcer deformity was found in the duodenal bulb.        The second portion of the duodenum was normal.   Impression:            - EUS with large gastric varices.                          - Normal esophagus.                          - Type 1 isolated gastric varices (IGV1, varices                          located in the fundus), without bleeding. Injected.                          - Duodenal deformity.                          - Normal second portion of the duodenum.                          - No specimens collected.   Estimated Blood Loss:  Estimated blood loss: none.   Recommendation:        - Return patient to ICU for ongoing care.                           - NPO.                          - Patient has a contact number available for                          emergencies. The signs and symptoms of potential                          delayed complications were discussed with the                          patient. Return to normal activities tomorrow.                          Written discharge instructions were provided to the                          patient.      Attending Participation:        I personally performed the entire procedure.   Scope In: 1:15:46 PM   Scope Out: 2:28:54 PM   MD Lashell Kent MD   3/1/2024 6:04:34 PM   This report has been signed electronically by MD Kaylen Myers MD        ASSESSMENT / PLAN     ASSESSMENT/PLAN:    Ms. Vera Beard is a 43yoF with alcohol use disorder c/b cirrhosis, HE (intermittently refuses lactulose), Hx GIB (gastric varices, no banding, injected with cyanoacrylate on 3/1/24) and chronic pancreatitis who presented to ED 4/9 for AMS and lethargy, intubated for airway protection. Currently in MICU on vasopressor support and mechanical ventilation for suspected septic shock from multifocal pneumonia + hemorrhagic shock  given Hgb of 6.3 on admission. Recent admission at [facility] from 2/29-3/9 for hemetemesis --> hemorrhagic shock from upper GIB w/ Hgb of 3.2, lactate 13, hospital course noteworthy for cardiac arrest due to acidosis during emergent intubation, ROSC after 2 rounds with no neuro deficits. Left AMA in setting of downtrending Hgb.     Acute decompensated liver cirrhosis likely due to septic shock, HE?  Metabolic encephalopathy due to decompensated cirrhosis, Hx HE, metabolic derangements, sepsis  Suspected septic shock due to multifocal Pseudomonal pneumonia + SBP  AHRF due to above requiring mechanical ventilation  Possible HRS  Acute on chronic macrocytic anemia s/p 2 units PRBCs 4/9  Oliguric KEITH requiring CVVH  Thrombocytopenia and coagulopathy due to Hx  cirrhosis, shock  Lactic acidosis  -SCR of 4.8 on admission (baseline 1)--> 2.8 in setting of CRRT  -diagnostic paracentesis in ED w/ 2,072,000 RBC, 17,078 WBC w. 76% PMNs; 4691 PMNs after correction for RBCs   -Hb 6.3 on admission, previously 3.2 on admission at [facility], baseline ~11; s/p 2 unit PRBCs. No obvious bleeding to date, brown BMs  -US doppler 4/9--> Hepatic cirrhosis with large volume ascites. Limited evaluation of the portal venous system.  -75g 25% albumin, 1L LR in ED  MELD 3.0: 35 at 4/12/2024  3:17 AM  MELD-Na: 32 at 4/12/2024  3:17 AM  Calculated from:  Serum Creatinine: 1.64 mg/dL at 4/12/2024  3:17 AM  Serum Sodium: 129 mmol/L at 4/12/2024  3:17 AM  Total Bilirubin: 3.6 mg/dL at 4/12/2024  3:17 AM  Serum Albumin: 2.4 g/dL at 4/12/2024  3:17 AM  INR(ratio): 3.3 at 4/12/2024  3:17 AM  Age at listing (hypothetical): 43 years  Sex: Female at 4/12/2024  3:17 AM     - Ascites: present, large amount, last tap 4/9/24  +SBP w/ correction from traumatic tap. No prior history SBP- no fluid studies to review, no ascites on CT a/P or ultrasound. Will need SBP ppx once off antibiotics  - Volume: Diffusely overloaded, anasarca. No home diuretics, would not start at this time given severe KEITH, on CVVH  - EV: gastric varices c/b hemorrhagic shock [facility] admission 3/9, present on EGD 3/1/24 IGV1 s/p glue embolization. Last egd at [facility] 3/1 large isolated gastric varices were seen in the gastric fundus but no evidence of active bleeding or stigmata of bleeding from these. No EV. Given this findings, we came back to perform a bedside EUS with advanced endoscopy staff with plans for coil embolization of gastric varices - however there were a large extent of vessels of large size without a culprit vessel. So the decision was made to re-approach with upper endoscopy and inject glue into largest varix for eradication. Of note, she also had a duodenal abnormality that made entering her duodenum very diffucult - possibly related  to her hx of chronic pancreatitis / necrotizing pancreatitis.   - HE: unable to assess given sedation. No clear history HE, no home lactulose on med rec. Currently on lactulose/rifaximin  - HCC screenin/10 Ultrasound liver without lesions, no AFP sent  - Transplant: Defer given critical illness.      Recommendations  -Recommend GOC convo with pall care consult given multi organ failure  -Agree w/ management per MICU team, continue empiric abx  -Trend daily MELD labs including INR  -Continue Rifaximin 550 mg twice daily, Lactulose 20 g every 2 hours until bowel movements achieved; will then titrate to 3-4 bowel movements per day. Has had 1 small brown on 4/10  -Monitor for overt GIB  -Transfuse Hb<7, goal Hgb 7-8, avoid over transfusion as can worsen portal HTN  -Will need SBP ppx once off broad spectrum antibiotics     This note was generated by a resident and staffed w/ fellow and attending physician.  Basilia Alcazar, DO     Thank you for the consultation. Hepatology will sign off, please reconsult if needed.     - During weekday hours of 7am- 5pm, please do not hesitate to contact me on trinket Chat or page [phone] if there are any further questions   - After hours, on weekends, and on holidays, please page the on-call GI fellow at [phone]. Thank you.      Sherie Ramos MD  PGY4 Gastroenterology Fellow  [facility]

## 2024-04-12 NOTE — PROGRESS NOTES
"Vancomycin Dosing by Pharmacy- FOLLOW UP    Vera Beard is a 43 y.o. year old female who Pharmacy has been consulted for vancomycin dosing for pneumonia. Based on the patient's indication and renal status this patient is being dosed based on a goal trough/random level of 15-20.     Renal function is currently declining.  On CVVH    Current vancomycin dose: 750 mg given every 12 hours    Most recent random level: 20.1 mcg/mL    Visit Vitals  /60   Pulse 98   Temp 35.5 °C (95.9 °F)   Resp 18        Lab Results   Component Value Date    CREATININE 2.15 (H) 04/11/2024    CREATININE 2.23 (H) 04/11/2024    CREATININE 2.26 (H) 04/11/2024    CREATININE 2.87 (H) 04/11/2024    CREATININE 2.80 (H) 04/11/2024        Patient weight is No results found for: \"PTWEIGHT\"    No results found for: \"CULTURE\"     I/O last 3 completed shifts:  In: 6168.9 (85.8 mL/kg) [I.V.:3078.9 (42.8 mL/kg); Blood:300; NG/GT:840; IV Piggyback:1950]  Out: 939 (13.1 mL/kg) [Emesis/NG output:200; Other:739]  Weight: 71.9 kg   [unfilled]    Lab Results   Component Value Date    PATIENTTEMP 37.0 04/11/2024    PATIENTTEMP 37.0 04/11/2024    PATIENTTEMP 37.0 04/11/2024        Assessment/Plan    Above goal random/trough level. Orders placed for new vancomycin regimen of 500mg every 12 hours to begin at 0800 .  The next level will be obtained on 4/13 at 5a. May be obtained sooner if clinically indicated.   Will continue to monitor renal function daily while on vancomycin and order serum creatinine at least every 48 hours if not already ordered.  Follow for continued vancomycin needs, clinical response, and signs/symptoms of toxicity.       Hernesto Brink, PharmD           "

## 2024-04-13 NOTE — PROGRESS NOTES
Vancomycin Dosing by Pharmacy- Cessation of Therapy    Consult to pharmacy for vancomycin dosing has been discontinued by the prescriber, pharmacy will sign off at this time.    Please call pharmacy if there are further questions or re-enter a consult if vancomycin is resumed.     PATRIC WOLF, PharmD

## 2024-04-13 NOTE — ACP (ADVANCE CARE PLANNING)
Confirming Previous Code Status:   Advance Care Planning Note     Discussion Date: 04/13/24   Discussion Participants: patient, daughter, and prior      The patient wishes to discuss Advance Care Planning today and the following is a brief summary of our discussion.     Patient has capacity to make their own medical decisions: No  Health Care Agent/Surrogate Decision Maker documented in chart: Yes    Documents on file and valid:  Advance Directive/Living Will:  No    Health Care Power of :  No    Communication of Medical Status/Prognosis:   Pt has overall poor prognosis with multi-organ failure, currently on 4 pressors on max vent settings and on dialysis.    Communication of Treatment Goals/Options:   Family communicated to author that they do not want pt to suffer and that they want her to be at peace. Was told by family that pt is an organ donor and they would like [facility] called to see if there was a chance for organ donation.     Treatment Decisions  Goals of Care: comfort is paramount; other goals are not relevant or achievable   Plan is to keep pt alive for now while awaiting [facility] assessment; code status has been changed to comfort care     Follow Up Plan  Await [facility] assessment for organ donation     Team Members  Ana Cordova MD     Time Statement: Total face to face time spent on advance care planning was 20 minutes with 10 minutes spent in counseling, including the explanation.      UPDATE:   Pt deemed not candidate for organ donation by [facility]. Have contacted family and they have decided to withdraw care at this time. Will initiate full comfort measures.     Carmen Cordova MD  4/13/2024 1:12 AM

## 2024-04-13 NOTE — SIGNIFICANT EVENT
Death Note    Date of Death:  04/13/24   Time of Death:  2.46 AM   Preliminary Cause of Death:  Multi-system organ failure     Notified by RN that patient was found without a pulse.   On physical exam pt not responsive to stimuli.  Pupils fixed and dilated.   Absent cardiopulmonary sounds, auscultated for >60 seconds.  Absent pulses, palpated for >60 seconds.    Patients next of kin informed via telephone and condolences were provided. Attending physician Dr. Hammer was notified of patients status.      Robert Cordova MD, PGY 3

## 2024-04-15 NOTE — SIGNIFICANT EVENT
Death Within 24 Hours of Soft Wrist Restraint Utilization    Death within 24 hours of soft wrist restraint logged at 4/15/2024 at 11:03 AM.    Rae Rodriguez RN  Date: 4/15/2024  Time: 11:03 AM

## 2024-04-17 LAB
ATRIAL RATE: 109 BPM
P AXIS: 61 DEGREES
P OFFSET: 204 MS
P ONSET: 163 MS
PR INTERVAL: 120 MS
Q ONSET: 223 MS
QRS COUNT: 18 BEATS
QRS DURATION: 74 MS
QT INTERVAL: 424 MS
QTC CALCULATION(BAZETT): 570 MS
QTC FREDERICIA: 517 MS
R AXIS: -8 DEGREES
T AXIS: 26 DEGREES
T OFFSET: 435 MS
VENTRICULAR RATE: 109 BPM

## 2024-04-17 NOTE — DISCHARGE SUMMARY
Discharge Diagnosis  Hypoglycemia    Issues Requiring Follow-Up  Patient passed away.     Test Results Pending At Discharge  Pending Labs       Order Current Status    Pathologist Review-CBC Differential In process            Hospital Course   43 y.o. female  with past medical history of chronic pancreatitis per chart review, alcohol induced hepatic cirrhosis with prior upper GI bleed and hepatic encephalopathy who presented to the emergency department for lethargy and altered mental status. While in the emergency department, patient was hypotensive and mental status worsened necessitating emergent intubation and initiation of vasopressors. Currently suspect septic shock in setting of multifocal pneumonia seen on CT chest on admission, as well as some component of hemorrhagic shock given hemoglobin of 6.3 on admission. Required RRT. Worsened shock requiring 4 pressors. Family opted for comfort care measures.    Pertinent Physical Exam At Time of Discharge  Physical Exam    Home Medications     Medication List      ASK your doctor about these medications     escitalopram 5 mg tablet; Commonly known as: Lexapro   folic acid 1 mg tablet; Commonly known as: Folvite   pantoprazole 40 mg EC tablet; Commonly known as: ProtoNix   thiamine 100 mg tablet; Commonly known as: Vitamin B-1   traZODone 50 mg tablet; Commonly known as: Desyrel       Outpatient Follow-Up  No future appointments.    Rachelle Minor MD

## 2024-10-08 NOTE — PROGRESS NOTES
"Vera Beard is a 43 y.o. female on day 3 of admission presenting with septic shock.    Subjective   Overnight: overnight, patient noted to have elevated bladder pressure, KUB showing ileus. Lactulose was discontinued and patient was started on miralax. She was started on micafungin given concern for abdominal process. She was given 1 unit PRBCs for hb 6.7. Was given 1 unit cryo for for low fibrinogen and coagulopathy.        Objective     Physical Exam  Constitutional:       Comments: Intubated, sedated; not responding/following   HENT:      Mouth/Throat:      Mouth: Mucous membranes are moist.      Pharynx: Oropharynx is clear.   Eyes:      Pupils: Pupils are equal, round, and reactive to light.      Comments: L conjunctival hemorrhage   Cardiovascular:      Rate and Rhythm: Normal rate and regular rhythm.      Pulses: Normal pulses.      Heart sounds: No murmur heard.  Pulmonary:      Comments: Coarse bilateral rhonchi, R>L  Abdominal:      Comments: Distended, firm   Musculoskeletal:      Right lower leg: Edema present.      Left lower leg: Edema present.   Skin:     General: Skin is dry.      Capillary Refill: Capillary refill takes 2 to 3 seconds.   Neurological:      Comments: Sedated, not responding/following         Last Recorded Vitals  Blood pressure 129/66, pulse 100, temperature 35.9 °C (96.6 °F), resp. rate 10, height 1.651 m (5' 5\"), weight 77.3 kg (170 lb 6.7 oz), SpO2 93%.  Intake/Output last 3 Shifts:  I/O last 3 completed shifts:  In: 5114.3 (66.2 mL/kg) [I.V.:2359.3 (30.5 mL/kg); NG/GT:700; IV Piggyback:2055]  Out: 1968 (25.5 mL/kg) [Emesis/NG output:800; Other:1168]  Weight: 77.3 kg     Relevant Results  Scheduled medications  albumin human, 50 g, intravenous, Once  bisacodyl, 10 mg, rectal, Daily  bisacodyl, 10 mg, rectal, Once  folic acid, 1 mg, oral, Daily  hydrocortisone sodium succinate, 50 mg, intravenous, q6h  insulin lispro, 0-10 Units, subcutaneous, q4h  levothyroxine, 88 mcg, oral, " Daily  meropenem, 2 g, intravenous, q12h  micafungin, 100 mg, intravenous, q24h  pantoprazole, 40 mg, intravenous, BID  polyethylene glycol, 17 g, oral, BID  rifAXIMin, 550 mg, oral, q12h TEZ  tobramycin, 7 mg/kg (Adjusted), intravenous, q24h  vancomycin, 500 mg, intravenous, q12h      Continuous medications  angiotensin II (Giapreza) 2.5 mg in sodium chloride 0.9% 250 mL (0.01 mg/mL) infusion, 1.25-40 ng/kg/min, Last Rate: 40 ng/kg/min (04/12/24 1400)  EPINEPHrine, 0-2 mcg/kg/min (Order-Specific)  EPINEPHrine, 0-2 mcg/kg/min, Last Rate: 0.12 mcg/kg/min (04/12/24 1420)  fentaNYL,  mcg/hr, Last Rate: 25 mcg/hr (04/12/24 1400)  norepinephrine, 0-3 mcg/kg/min (Order-Specific), Last Rate: 0.4 mcg/kg/min (04/12/24 1420)  oxygen,   PrismaSol 4/2.5, 2,100 mL/hr, Last Rate: 2,100 mL/hr (04/12/24 1035)  propofol, 5-50 mcg/kg/min, Last Rate: Stopped (04/12/24 1228)  vasopressin, 0.03 Units/min, Last Rate: 0.03 Units/min (04/12/24 1400)      PRN medications  PRN medications: dextrose, dextrose, fentaNYL, glucagon, insulin regular, oxygen, vancomycin  Results for orders placed or performed during the hospital encounter of 04/09/24 (from the past 24 hour(s))   Blood Gas Arterial Full Panel   Result Value Ref Range    POCT pH, Arterial 7.33 (L) 7.38 - 7.42 pH    POCT pCO2, Arterial 33 (L) 38 - 42 mm Hg    POCT pO2, Arterial 78 (L) 85 - 95 mm Hg    POCT SO2, Arterial 97 94 - 100 %    POCT Oxy Hemoglobin, Arterial 94.6 94.0 - 98.0 %    POCT Hematocrit Calculated, Arterial 22.0 (L) 36.0 - 46.0 %    POCT Sodium, Arterial 126 (L) 136 - 145 mmol/L    POCT Potassium, Arterial 4.5 3.5 - 5.3 mmol/L    POCT Chloride, Arterial 99 98 - 107 mmol/L    POCT Ionized Calcium, Arterial 1.03 (L) 1.10 - 1.33 mmol/L    POCT Glucose, Arterial 162 (H) 74 - 99 mg/dL    POCT Lactate, Arterial 5.6 (HH) 0.4 - 2.0 mmol/L    POCT Base Excess, Arterial -7.8 (L) -2.0 - 3.0 mmol/L    POCT HCO3 Calculated, Arterial 17.4 (L) 22.0 - 26.0 mmol/L    POCT  Hemoglobin, Arterial 7.4 (L) 12.0 - 16.0 g/dL    POCT Anion Gap, Arterial 14 10 - 25 mmo/L    Patient Temperature 37.0 degrees Celsius    FiO2 60 %   CBC and Auto Differential   Result Value Ref Range    WBC 25.6 (H) 4.4 - 11.3 x10*3/uL    nRBC 0.0 0.0 - 0.0 /100 WBCs    RBC 2.32 (L) 4.00 - 5.20 x10*6/uL    Hemoglobin 7.2 (L) 12.0 - 16.0 g/dL    Hematocrit 20.0 (L) 36.0 - 46.0 %    MCV 86 80 - 100 fL    MCH 31.0 26.0 - 34.0 pg    MCHC 36.0 32.0 - 36.0 g/dL    RDW 19.5 (H) 11.5 - 14.5 %    Platelets 47 (L) 150 - 450 x10*3/uL    Immature Granulocytes %, Automated 0.8 0.0 - 0.9 %    Immature Granulocytes Absolute, Automated 0.21 0.00 - 0.70 x10*3/uL   Manual Differential   Result Value Ref Range    Neutrophils %, Manual 74.0 40.0 - 80.0 %    Bands %, Manual 20.5 0.0 - 5.0 %    Lymphocytes %, Manual 1.6 13.0 - 44.0 %    Monocytes %, Manual 0.8 2.0 - 10.0 %    Eosinophils %, Manual 0.0 0.0 - 6.0 %    Basophils %, Manual 0.0 0.0 - 2.0 %    Myelocytes %, Manual 3.1 0.0 - 0.0 %    Seg Neutrophils Absolute, Manual 18.94 (H) 1.20 - 7.00 x10*3/uL    Bands Absolute, Manual 5.25 (H) 0.00 - 0.70 x10*3/uL    Lymphocytes Absolute, Manual 0.41 (L) 1.20 - 4.80 x10*3/uL    Monocytes Absolute, Manual 0.20 0.10 - 1.00 x10*3/uL    Eosinophils Absolute, Manual 0.00 0.00 - 0.70 x10*3/uL    Basophils Absolute, Manual 0.00 0.00 - 0.10 x10*3/uL    Myelocytes Absolute, Manual 0.79 0.00 - 0.00 x10*3/uL    Total Cells Counted 127     Neutrophils Absolute, Manual 24.19 (H) 1.20 - 7.70 x10*3/uL    RBC Morphology See Below     [place] Cells Many     Acanthocytes Few    POCT GLUCOSE   Result Value Ref Range    POCT Glucose 164 (H) 74 - 99 mg/dL   Renal Function Panel   Result Value Ref Range    Glucose 158 (H) 74 - 99 mg/dL    Sodium 129 (L) 136 - 145 mmol/L    Potassium 4.7 3.5 - 5.3 mmol/L    Chloride 97 (L) 98 - 107 mmol/L    Bicarbonate 19 (L) 21 - 32 mmol/L    Anion Gap 18 10 - 20 mmol/L    Urea Nitrogen 8 6 - 23 mg/dL    Creatinine 2.15 (H) 0.50  - 1.05 mg/dL    eGFR 28 (L) >60 mL/min/1.73m*2    Calcium 7.6 (L) 8.6 - 10.6 mg/dL    Phosphorus 3.0 2.5 - 4.9 mg/dL    Albumin 2.6 (L) 3.4 - 5.0 g/dL   Magnesium   Result Value Ref Range    Magnesium 2.32 1.60 - 2.40 mg/dL   Tobramycin   Result Value Ref Range    Tobramycin 1.4 SEE BELOW ug/mL   Vancomycin, Trough   Result Value Ref Range    Vancomycin, Trough 20.1 (HH) 5.0 - 20.0 ug/mL   POCT GLUCOSE   Result Value Ref Range    POCT Glucose 167 (H) 74 - 99 mg/dL   BLOOD GAS ARTERIAL FULL PANEL   Result Value Ref Range    POCT pH, Arterial 7.32 (L) 7.38 - 7.42 pH    POCT pCO2, Arterial 32 (L) 38 - 42 mm Hg    POCT pO2, Arterial 100 (H) 85 - 95 mm Hg    POCT SO2, Arterial 99 94 - 100 %    POCT Oxy Hemoglobin, Arterial 97.5 94.0 - 98.0 %    POCT Hematocrit Calculated, Arterial 21.0 (L) 36.0 - 46.0 %    POCT Sodium, Arterial 127 (L) 136 - 145 mmol/L    POCT Potassium, Arterial 4.5 3.5 - 5.3 mmol/L    POCT Chloride, Arterial 98 98 - 107 mmol/L    POCT Ionized Calcium, Arterial 1.05 (L) 1.10 - 1.33 mmol/L    POCT Glucose, Arterial 174 (H) 74 - 99 mg/dL    POCT Lactate, Arterial 6.7 (HH) 0.4 - 2.0 mmol/L    POCT Base Excess, Arterial -8.8 (L) -2.0 - 3.0 mmol/L    POCT HCO3 Calculated, Arterial 16.5 (L) 22.0 - 26.0 mmol/L    POCT Hemoglobin, Arterial 7.0 (L) 12.0 - 16.0 g/dL    POCT Anion Gap, Arterial 17 10 - 25 mmo/L    Patient Temperature 37.0 degrees Celsius    FiO2 60 %   POCT GLUCOSE   Result Value Ref Range    POCT Glucose 170 (H) 74 - 99 mg/dL   CBC and Auto Differential   Result Value Ref Range    WBC 28.6 (H) 4.4 - 11.3 x10*3/uL    nRBC 0.1 (H) 0.0 - 0.0 /100 WBCs    RBC 2.16 (L) 4.00 - 5.20 x10*6/uL    Hemoglobin 6.7 (L) 12.0 - 16.0 g/dL    Hematocrit 18.7 (L) 36.0 - 46.0 %    MCV 87 80 - 100 fL    MCH 31.0 26.0 - 34.0 pg    MCHC 35.8 32.0 - 36.0 g/dL    RDW 19.2 (H) 11.5 - 14.5 %    Platelets 37 (LL) 150 - 450 x10*3/uL    Immature Granulocytes %, Automated 0.6 0.0 - 0.9 %    Immature Granulocytes Absolute,  Automated 0.16 0.00 - 0.70 x10*3/uL   Hepatic function panel   Result Value Ref Range    Albumin 2.4 (L) 3.4 - 5.0 g/dL    Bilirubin, Total 3.6 (H) 0.0 - 1.2 mg/dL    Bilirubin, Direct 2.3 (H) 0.0 - 0.3 mg/dL    Alkaline Phosphatase 87 33 - 110 U/L    ALT 21 7 - 45 U/L    AST 65 (H) 9 - 39 U/L    Total Protein 5.4 (L) 6.4 - 8.2 g/dL   Calcium, ionized   Result Value Ref Range    POCT Calcium, Ionized 1.05 (L) 1.1 - 1.33 mmol/L   Renal Function Panel   Result Value Ref Range    Glucose 175 (H) 74 - 99 mg/dL    Sodium 129 (L) 136 - 145 mmol/L    Potassium 4.2 3.5 - 5.3 mmol/L    Chloride 96 (L) 98 - 107 mmol/L    Bicarbonate 17 (L) 21 - 32 mmol/L    Anion Gap 20 10 - 20 mmol/L    Urea Nitrogen 7 6 - 23 mg/dL    Creatinine 1.64 (H) 0.50 - 1.05 mg/dL    eGFR 39 (L) >60 mL/min/1.73m*2    Calcium 7.8 (L) 8.6 - 10.6 mg/dL    Phosphorus 2.7 2.5 - 4.9 mg/dL    Albumin 2.4 (L) 3.4 - 5.0 g/dL   Magnesium   Result Value Ref Range    Magnesium 2.40 1.60 - 2.40 mg/dL   Coagulation Screen   Result Value Ref Range    Protime 38.1 (H) 9.8 - 12.8 seconds    INR 3.3 (H) 0.9 - 1.1    aPTT 101 (HH) 27 - 38 seconds   Fibrinogen   Result Value Ref Range    Fibrinogen 55 (LL) 200 - 400 mg/dL   Manual Differential   Result Value Ref Range    Neutrophils %, Manual 97.5 40.0 - 80.0 %    Lymphocytes %, Manual 0.0 13.0 - 44.0 %    Monocytes %, Manual 2.5 2.0 - 10.0 %    Eosinophils %, Manual 0.0 0.0 - 6.0 %    Basophils %, Manual 0.0 0.0 - 2.0 %    Seg Neutrophils Absolute, Manual 27.89 (H) 1.20 - 7.00 x10*3/uL    Lymphocytes Absolute, Manual 0.00 (L) 1.20 - 4.80 x10*3/uL    Monocytes Absolute, Manual 0.72 0.10 - 1.00 x10*3/uL    Eosinophils Absolute, Manual 0.00 0.00 - 0.70 x10*3/uL    Basophils Absolute, Manual 0.00 0.00 - 0.10 x10*3/uL    Total Cells Counted 122     RBC Morphology See Below     Hypochromia Mild     Target Cells Few     Flor Cells Few    POCT GLUCOSE   Result Value Ref Range    POCT Glucose 182 (H) 74 - 99 mg/dL   Prepare  RBC: 1 Units   Result Value Ref Range    PRODUCT CODE Z1561W14     Unit Number [id number]     Unit ABO A     Unit RH POS     XM INTEP COMP     Dispense Status TR     Blood Expiration Date April 30, 2024 23:59 EDT     PRODUCT BLOOD TYPE 6200     UNIT VOLUME 350    POCT GLUCOSE   Result Value Ref Range    POCT Glucose 157 (H) 74 - 99 mg/dL   Blood Gas Arterial Full Panel   Result Value Ref Range    POCT pH, Arterial 7.33 (L) 7.38 - 7.42 pH    POCT pCO2, Arterial 33 (L) 38 - 42 mm Hg    POCT pO2, Arterial 189 (H) 85 - 95 mm Hg    POCT SO2, Arterial 100 94 - 100 %    POCT Oxy Hemoglobin, Arterial 97.8 94.0 - 98.0 %    POCT Hematocrit Calculated, Arterial 24.0 (L) 36.0 - 46.0 %    POCT Sodium, Arterial 129 (L) 136 - 145 mmol/L    POCT Potassium, Arterial 4.1 3.5 - 5.3 mmol/L    POCT Chloride, Arterial 100 98 - 107 mmol/L    POCT Ionized Calcium, Arterial 1.07 (L) 1.10 - 1.33 mmol/L    POCT Glucose, Arterial 149 (H) 74 - 99 mg/dL    POCT Lactate, Arterial 6.4 (HH) 0.4 - 2.0 mmol/L    POCT Base Excess, Arterial -7.8 (L) -2.0 - 3.0 mmol/L    POCT HCO3 Calculated, Arterial 17.4 (L) 22.0 - 26.0 mmol/L    POCT Hemoglobin, Arterial 8.1 (L) 12.0 - 16.0 g/dL    POCT Anion Gap, Arterial 16 10 - 25 mmo/L    Patient Temperature 37.0 degrees Celsius    FiO2 100 %   Prepare Cryoprecipitated AHF (Pooled Units): 1 Pools   Result Value Ref Range    PRODUCT CODE K3050F55     Unit Number [id number]     Unit ABO O     Unit RH POS     Dispense Status TR     Blood Expiration Date April 12, 2024 13:47 EDT     PRODUCT BLOOD TYPE 5100     UNIT VOLUME 90    CBC   Result Value Ref Range    WBC 25.4 (H) 4.4 - 11.3 x10*3/uL    nRBC 0.3 (H) 0.0 - 0.0 /100 WBCs    RBC 2.64 (L) 4.00 - 5.20 x10*6/uL    Hemoglobin 7.7 (L) 12.0 - 16.0 g/dL    Hematocrit 22.0 (L) 36.0 - 46.0 %    MCV 83 80 - 100 fL    MCH 29.2 26.0 - 34.0 pg    MCHC 35.0 32.0 - 36.0 g/dL    RDW 20.9 (H) 11.5 - 14.5 %    Platelets 28 (LL) 150 - 450 x10*3/uL   POCT GLUCOSE   Result  Value Ref Range    POCT Glucose 159 (H) 74 - 99 mg/dL   Blood Gas Arterial Full Panel   Result Value Ref Range    POCT pH, Arterial 7.20 (LL) 7.38 - 7.42 pH    POCT pCO2, Arterial 50 (H) 38 - 42 mm Hg    POCT pO2, Arterial 69 (L) 85 - 95 mm Hg    POCT SO2, Arterial 92 (L) 94 - 100 %    POCT Oxy Hemoglobin, Arterial 90.3 (L) 94.0 - 98.0 %    POCT Hematocrit Calculated, Arterial 31.0 (L) 36.0 - 46.0 %    POCT Sodium, Arterial 128 (L) 136 - 145 mmol/L    POCT Potassium, Arterial 4.5 3.5 - 5.3 mmol/L    POCT Chloride, Arterial 99 98 - 107 mmol/L    POCT Ionized Calcium, Arterial 1.08 (L) 1.10 - 1.33 mmol/L    POCT Glucose, Arterial 137 (H) 74 - 99 mg/dL    POCT Lactate, Arterial 5.6 (HH) 0.4 - 2.0 mmol/L    POCT Base Excess, Arterial -8.4 (L) -2.0 - 3.0 mmol/L    POCT HCO3 Calculated, Arterial 19.5 (L) 22.0 - 26.0 mmol/L    POCT Hemoglobin, Arterial 10.2 (L) 12.0 - 16.0 g/dL    POCT Anion Gap, Arterial 14 10 - 25 mmo/L    Patient Temperature 37.0 degrees Celsius    FiO2 100 %     CT chest abdomen pelvis wo IV contrast    Result Date: 4/10/2024  Interpreted By:  Maxime Wilkes and Fu Tianyuan STUDY: CT CHEST ABDOMEN PELVIS WO CONTRAST;  4/9/2024 2:13 pm   INDICATION: Signs/Symptoms:Sepsis. Presenting with altered mental status.   COMPARISON: None.   ACCESSION NUMBER(S): [id number]   ORDERING CLINICIAN: BESS SEGOVIA   TECHNIQUE: CT of the chest, abdomen and pelvis was performed. Contiguous axial images were obtained at 3 mm slice thickness through the chest, abdomen and pelvis. Coronal and sagittal reconstructions at 3 mm slice thickness were performed. No intravenous contrast was administered.   FINDINGS: Please note that the study is limited without intravenous contrast.   CHEST:   LUNG/PLEURA/LARGE AIRWAYS: Endotracheal tube in place with the tip terminating at the level of the faby/right mainstem bronchus. Mild secretions in the distal trachea.   There are extensive confluent and patchy consolidations and  ground-glass opacities throughout the bilateral lungs consistent with multifocal pneumonia. Small bilateral pleural effusions, right-greater-than-left. No pneumothorax.   VESSELS: Aorta and pulmonary arteries are normal caliber.  No significant atherosclerotic changes of the thoracic aorta. Mild coronary artery calcifications are present.   HEART: The heart is normal in size. Small pericardial effusion.   MEDIASTINUM AND CHARIS: No mediastinal, hilar, or axillary lymphadenopathy. The esophagus is within normal limits.   CHEST WALL AND LOWER NECK: Severe diffuse chest wall edema is present. The visualized thyroid gland appears within normal limits.   ABDOMEN:   LIVER: Normal size and smooth contour. Severe diffuse hepatic steatosis. No focal parenchymal abnormalities are seen.   BILE DUCTS: The intrahepatic and extrahepatic bile ducts are nondilated.   GALLBLADDER: Cholelithiasis. The gallbladder is nondistended.   PANCREAS: The pancreas is unremarkable without evidence of ductal dilation or masses.   SPLEEN: The spleen is normal in size and without evidence of focal lesions.   ADRENAL GLANDS: Within normal limits.   KIDNEYS AND URETERS: Bilateral kidneys are mildly atrophic. Punctate 0.3 cm calculus at the right upper pole. No hydronephrosis.   PELVIS:   BLADDER: The bladder is decompressed with Tadeo catheter in place.   REPRODUCTIVE ORGANS: The uterus is present. No suspicious pelvic masses.   BOWEL: The stomach is unremarkable.  The small and large bowel are normal in caliber and demonstrate no wall thickening.  The appendix is not definitively visualized.   VESSELS: There is no aneurysmal dilatation of the abdominal aorta. The IVC is within normal limits. Mild atherosclerotic changes of the abdominal aorta and its branch vessels.   PERITONEUM/RETROPERITONEUM/LYMPH NODES: There is large volume ascites. No loculated fluid collections or intraperitoneal free air. No abdominopelvic lymphadenopathy is present.    ABDOMINAL WALL: Severe diffuse abdominal wall anasarca.   BONES: No acute osseous abnormality or suspicious osseous lesions.       Chest 1. Endotracheal tube in place with the tip terminating at the level of the origin of the right mainstem bronchus. 2. Extensive consolidation/ground-glass opacities throughout both lungs consistent with multifocal pneumonia. 3. Small bilateral pleural effusions. 4. Small pericardial effusion.   Abdomen-Pelvis 1. Large volume ascites. 2. Severe diffuse hepatic steatosis. 3. Cholelithiasis. 4. Severe body wall anasarca.   I personally reviewed the images/study and I agree with the findings as stated by resident physician Rosales Vernon MD. This study was interpreted at [place], [place].   The above information was relayed to Ordering provider Perri Watts by Rosales Vernon MD via epic haiku at 2:30 p.m. on 04/09/2024 with readback verification.   MACRO: None   Signed by: Maxime Wilkes 4/10/2024 12:20 PM Dictation workstation:   [id number]    XR chest 1 view    Result Date: 4/10/2024  Interpreted By:  Clary Mcmillan, STUDY: XR CHEST 1 VIEW; 4/10/2024 7:49 am   INDICATION: Signs/Symptoms:dyspnea.   COMPARISON: Radiograph dated 04/09/2024   ACCESSION NUMBER(S): [id number]   ORDERING CLINICIAN: VIKAS BARRY   FINDINGS: ET tube is terminating 3.9 cm from the faby. Enteric tube is in place with the tip outside the field of view. Right IJ central venous catheter is projecting over right atrium.   The cardiac silhouette size is within normal limits.   Multifocal consolidation in bilateral lungs with slight improvement in the aeration of the right upper lung. Small left pleural effusion. No sizable pneumothorax.   No acute osseous change.       1. Multifocal consolidative opacities with increased interstitial markings. Findings may be due to severe edema or multifocal infection. Aeration of the right upper lung is improved compared  to prior study. 2. Small left pleural effusion. 3. Medical devices as described       Signed by: Clary Green 4/10/2024 8:09 AM Dictation workstation:   [id number]    US liver with doppler    Result Date: 4/10/2024  Interpreted By:  Robert Lawler  and Katherine Nelson STUDY: US LIVER WITH DOPPLER;  4/10/2024 4:12 am   INDICATION: Signs/Symptoms:SBP, alcohol cirrhosis decompensated; with dopplers.   COMPARISON: CT cap 04/09/2024   ACCESSION NUMBER(S): [id number]   ORDERING CLINICIAN: FERNANDO GUIDRY   TECHNIQUE: Multiple images of the right upper quadrant were obtained.  Gray scale, color Doppler and spectral Doppler waveform analysis was performed.   FINDINGS: Examination is significantly limited secondary to patient body habitus and extensive ascites. Within the limitation:   LIVER: The liver is small, heterogeneous, with nodular contour measuring 13.9 cm in the craniocaudal dimension.   GALLBLADDER: Cholelithiasis is seen without gallbladder dilatation, wall thickening, or pericholecystic fluid. Sonographic Jackson's sign is negative.   BILIARY SYSTEM: No significant intrahepatic biliary ductal dilatation. The CBD was not seen.   DOPPLER EVALUATION:   HEPATIC ARTERIES: Poorly visualized.   PORTAL VEIN: Limited evaluation of the portal vein system and branches. The main portal vein is patent measures 0.8 cm with peak velocity of 19 cm/s.   HEPATIC VEIN: The right, middle and left hepatic veins are patent and demonstrate triphasic antegrade flow. IVC appears also patent.   PANCREAS: The pancreas is poorly visualized due to overlying bowel gas.   RIGHT KIDNEY: The right kidney measures 7.9 cm in length. No overt hydronephrosis or renal calculi are seen.   SPLEEN: Poorly visualized.   PERITONEUM AND RETROPERITONEUM: Large volume ascites is seen.       Significantly limited examination.   1. Hepatic cirrhosis with large volume ascites. Limited evaluation of the portal venous system. 2. Cholelithiasis  without acute cholecystitis.     I personally reviewed the images/study and I agree with Dr. Omar Murphy and the findings as stated.   MACRO: None   Signed by: Robert Lawler 4/10/2024 5:59 AM Dictation workstation:   [id number]    ECG 12 lead    Result Date: 4/10/2024  Sinus tachycardia with Premature atrial complexes with Aberrant conduction Low voltage QRS Nonspecific T wave abnormality Abnormal ECG No previous ECGs available See ED provider note for full interpretation and clinical correlation Confirmed by Cesar Bentley ([id number]) on 4/10/2024 2:36:33 AM    XR abdomen 1 view    Result Date: 4/9/2024  Interpreted By:  Omer Tsai, STUDY: XR ABDOMEN 1 VIEW;  4/9/2024 6:19 pm   INDICATION: Signs/Symptoms:OG placement.   COMPARISON: CT chest abdomen pelvis 04/09/2024.   ACCESSION NUMBER(S): [id number]   ORDERING CLINICIAN: JAIME HE   FINDINGS: Enteric tube courses midline below the level of the diaphragm with the tip projecting over the expected location of the gastric antrum.   Nonobstructive bowel gas pattern. There is centralization of visualized loops of large bowel consistent with large volume ascites and diffuse anasarca on CT from 04/09/2024. Limited evaluation of pneumoperitoneum on supine imaging, however no gross evidence of free air is noted.   Unchanged patchy alveolar opacities throughout the bilateral lower lungs compared to prior examination with small left pleural effusion.   Osseous structures demonstrate no acute bony changes.       1.  Enteric tube with the distal tip projecting over the expected location of the gastric antrum. 2. Nonobstructive bowel gas pattern with centralization of visualized loops of large bowel consistent with previously observed large volume ascites and diffuse anasarca. 3. Unchanged patchy alveolar opacities and small left-sided pleural effusion better characterized on same day x-ray of the chest from 04/09/2024.   I personally reviewed the images/study  and I agree with the findings as stated by resident Omer Tsai. This study was interpreted at [place], [place].   MACRO: None     Dictation workstation:   [id number]    XR chest 1 view    Result Date: 4/9/2024  Interpreted By:  Kosmas, Servando,  and Shazia Lea STUDY: XR CHEST 1 VIEW;  4/9/2024 3:35 pm   INDICATION: Signs/Symptoms:central line placement, RIJ.   COMPARISON: Same day chest radiograph time stamped 1:34 p.m.   ACCESSION NUMBER(S): [id number]   ORDERING CLINICIAN: RYAN GUERRERO   FINDINGS: Single AP view of the chest was provided.   Interval retraction of an endotracheal tube with tip now terminating 3.2 cm above the faby, in satisfactory positioning. Interval placement of a right internal jugular approach central venous catheter with tip overlying the superior cavoatrial junction.   CARDIOMEDIASTINAL SILHOUETTE: Similar obscuration of much of the cardiac border due to overlying pulmonary opacities.   LUNGS: Similar dense patchy alveolar opacities throughout the bilateral lungs, primarily in the right upper lobe. Probable small left pleural effusion. No pneumothorax.   ABDOMEN: Hyperdense material overlying the stomach. Mild dilation of multiple loops of bowel in the upper abdomen.   BONES: No acute osseous changes.       1. Satisfactory positioning of right internal jugular approach central venous catheter. 2. Unchanged appearance of patchy pulmonary opacities throughout the bilateral lungs, particularly in the right upper lobe, with a probable small left pleural effusion. 3. Hyperdense material overlying the stomach with multiple dilated loops of bowel in the upper abdomen. Correlate with same day CT.   I personally reviewed the image(s)/study and resident interpretation as stated by Dr. Lea Mccray MD. I agree with the findings as stated. This study was interpreted at [facility],  [place].   MACRO: None   Signed by: Servando Sosa 4/9/2024 4:09 PM Dictation workstation:   [id number]    CT head wo IV contrast    Result Date: 4/9/2024  Interpreted By:  Bob Mitchell, STUDY: CT HEAD WO IV CONTRAST;  4/9/2024 2:08 pm   INDICATION: Signs/Symptoms:AMS.   COMPARISON: None.   ACCESSION NUMBER(S): [id number]   ORDERING CLINICIAN: BRITT JOHNSON   TECHNIQUE: Noncontrast axial CT scan of head was performed.   FINDINGS: Parenchyma: There is no intracranial hemorrhage. The grey-white differentiation is intact. There is no mass effect or midline shift. Patchy nonspecific white matter hypodensities involving the deep white matter of the bilateral cerebral hemispheres.   CSF Spaces: The ventricles, sulci and basal cisterns are within normal limits for age.   Extra-Axial Fluid: There is no extraaxial fluid collection.   Calvarium: The calvarium is unremarkable.   Paranasal sinuses: Visualized paranasal sinuses are clear.   Mastoids: Clear.   Orbits: Normal.   Soft tissues: Unremarkable.       No acute intracranial hemorrhage, mass effect, or CT apparent acute infarct.   Mild nonspecific patchy white matter hypodensity involving the bilateral cerebral hemispheres that may reflect age advanced chronic small vessel ischemic disease. MRI may be helpful for further characterization as clinically warranted.   MACRO: None   Signed by: Bob Mitchell 4/9/2024 2:30 PM Dictation workstation:   [id number]    XR chest 1 view    Result Date: 4/9/2024  Interpreted By:  Kosmas, Servando,  and Lynnwood Lea STUDY: XR CHEST 1 VIEW;  4/9/2024 1:43 pm   INDICATION: Signs/Symptoms:Intubation.   COMPARISON: Chest radiograph 04/09/2024 time stamped 11:53 a.m..   ACCESSION NUMBER(S): [id number]   ORDERING CLINICIAN: BESS SEGOVIA   FINDINGS: Single AP supine radiograph of the chest was provided.   Interval intubation with endotracheal tube tip terminating 0.6 cm above the faby.   CARDIOMEDIASTINAL SILHOUETTE: Much of  Him/He the cardiomediastinal silhouette is obscured by adjacent pulmonary opacities.   LUNGS: Increasing patchy alveolar pulmonary opacities throughout the bilateral lungs, particularly worsening in the right upper lung field and adjacent to the right heart border. Probable small left pleural effusion. No pneumothorax.   ABDOMEN: No remarkable upper abdominal findings.   BONES: No acute osseous changes.       1. Interval intubation with endotracheal tube 0.6 cm above the faby, recommend slight retraction. 2. Worsening patchy alveolar opacities throughout the bilateral lungs, particularly in the right upper and middle lobes suspicious for multifocal pneumonia likely with a component of pulmonary edema.   I personally reviewed the image(s)/study and resident interpretation as stated by Dr. Lea Mccray MD. I agree with the findings as stated. This study was interpreted at [facility], [place], [place].   MACRO: None   Signed by: Servando Sosa 4/9/2024 2:19 PM Dictation workstation:   [id number]    XR chest 1 view    Result Date: 4/9/2024  Interpreted By:  Servando Sosa, STUDY: XR CHEST 1 VIEW;  4/9/2024 12:02 pm   INDICATION: Signs/Symptoms:Hypoxia.   COMPARISON: None.   ACCESSION NUMBER(S): [id number]   ORDERING CLINICIAN: BESS SEGOVIA   FINDINGS:         CARDIOMEDIASTINAL SILHOUETTE: Cardiomediastinal silhouette is normal in size and configuration.   LUNGS: There is extensive multifocal dense airspace disease worse in the right upper lung and the bases bilaterally.   ABDOMEN: No remarkable upper abdominal findings.   BONES: No acute osseous changes.       Extensive multifocal dense airspace disease compatible with multifocal pneumonia. CT or radiographic follow-up to resolution is advised   MACRO: None   Signed by: Servando Sosa 4/9/2024 12:49 PM Dictation workstation:   [id number]     This patient has a central line   Reason for the central line remaining today?  Dialysis/Hemapheresis, Hemodynamic monitoring, and Parenteral medication      This patient is intubated   Reason for patient to remain intubated today? they have continued cardiopulmonary lability/instability and they have inadequate gas-exchange without positive pressure        Malnutrition Diagnosis Status: New  Malnutrition Diagnosis: Moderate malnutrition related to chronic disease or condition  As Evidenced by: noted moderate to severe fat and muscle wasting on physical exam; suspect pt was not eating adequately PTA in light of EtOH abuse based on admit records from [facility]  I agree with the dietitian's malnutrition diagnosis.      Assessment/Plan   Principal Problem:    Hypoglycemia  Active Problems:    Septic shock (Multi)  Vera Beard is a 43-year-old female with past medical history of chronic pancreatitis per chart review, alcohol induced hepatic cirrhosis with prior upper GI bleed and hepatic encephalopathy who presented to the emergency department for lethargy and altered mental status.  While in the emergency department, patient was hypotensive and mental status worsened necessitating emergent intubation and initiation of vasopressors.  Currently suspect septic shock in setting of multifocal pneumonia seen on CT chest on admission, as well as some component of hemorrhagic shock given hemoglobin of 6.3 on admission. Patient had increasing O2 requirements, requiring APRV and inhaled epoprostenol on 4/10. Developing ARDS. Patient was able to be returned to VC/AC and inhaled epoprostenol was weaned off, but requiring 4 pressor support 4/12.     Updates 4/12:   - Factor 8 activity normal 4/11, not DIC  - Started on micafungin overnight given concern for abdominal process and ileus  - Hypotension, started on epinephrine and angiotensin II  - 1 unit PRBCs, 1 unit cryo overnight for anemia and coagulopathy; wasn't stable enough for CT, KUB showing ileus; started bisacodyl suppository   - resp culture growing  klebsiella, and given patient WBC going up, will switch from Zosyn to Ivonne  - Started angiotensin II for hypotension   - repeat dose tobramycin 460mg for double gram negative coverage   - per discussion with the patient's daughter, possible transition to comfort care; she is flying from [place] today, will be at the hospital ~5pm     NEURO  # Metabolic encephalopathy  #Hepatic encephalopathy  :: Recent admission at [facility] for hemorrhagic shock in the setting of hematemesis; hemoglobin was 3.2 on admission with lactate of 13; EGD during that admission revealed grade 1 gastric varices which were injected   :: Ammonia 115 on admission  :: Possible some component of encephalopathy from metabolic derangements from sepsis  :: TSH 17.27, free T4 0.32 on admission  - 200mcg synthroid IV in ED   - 100mg hydrocortisone in ED   - Intubated in ED for AMS and airway protection  Plan:   -Rifaximin 550 mg twice daily  -Lactulose 20g q2hr held in setting of ileus; start miralax BID  -Antibiotics as under ID for sepsis  - Shock management as under cardiovascular  - thyroid as under endocrine  - acidosis as under pulm    #Sedation  - sedated while on ventilator and for ARDS  - RASS goal -3 to -4     CARDIOVASCULAR  #Shock 2/2 septic from pneumonia and SBP  :: CT chest abdomen pelvis 4/9 showing multifocal pneumonia  :: diagnostic paracentesis in ED w/ 2,072,000 RBC, 17,078 WBC w. 76% PMNs; 4691 PMNs after correction for RBCs  - Hypotensive 70s/50s in ED, now on pressors   - Hb 6.3 on admission, previously 3.2 on admission at [facility], baseline ~11; s/p 1 unit PRBCs  - Strep and legionella antigens negative  - 75g 25% albumin, 2L LR on 4/9; 75g albumin 25% on 4/10  - s/p Azithromycin 500mg every day 4/9-4/11  Plan:   - blood cultures x2, urinalysis w/ reflex culture, ascitic fluid as above  - Vanc (4/9-*)  - Stop zosyn (4/9-4/10); pseudomonas sensitive to zosyn, was restarted 4/11  - s/p tobramycin IV x1 on 4/10 for double pseudomonal  coverage given critical illness   - repeat tobramycin 460mg x1 4/12 for double gram negative   - restart meropenem 2g q12hr given worsening in clinical status and increase in WBC count   - continue micafungin started overnight (4/12-*)  - 50g 25% albumin given drop in MAP w/ inspiration indicating volume depletion; increase sedation   - Currently on levophed, vasopressin, epinephrine, and angiotensin II, MAP goal >65  - Hydrocortisone 50mg q6hrs     PULMONARY  #Acute hypoxic respiratory failure 2/2 multifocal pneumonia  #Moderate ARDS  :: CT chest on admission showing bilateral patchy infiltrates consistent with multifocal pneumonia  :: Hypoxic on presentation requiring nasal cannula, subsequently intubated for AMS and airway protection   - Pt became hypoxic on 100% FiO2 (pO2 54) w/ P/F 54 in setting of sepsis and multifocal pneumonia  - Most recently P/F ratio ~150 (pO2 91, FiO2 60%), improved w/ APRV and inhaled epoprostenol   - strep and legionella antigens negative   - MRSA nares positive  - Now saturating in the 90s with VC/AC  - Inhaled epoprostenol started 4/10-stopped 4/11  Plan:   - Abx per ID  - resp culture growing pseudomonas and klebsiella; both susceptible to zosyn, consider de-escalating from meropenem if more stable   - blood cultures x2   - Daily CXR  - Will hold off on proning and paralyzing patient given hemodynamic in-stability   - Will hold off on dexamethasone for ARDS as currently giving precedent to hydrocortisone for septic shock     #mixed anion gap metabolic acidosis and non-gap metabolic acidosis  - continued mixed gap likely 2/2 lactic acidosis/uremia and renal failure  Plan:   - Infection management as above  - CVVH as per nephrology   - ABG prn to monitor acidosis and oxygenation     GI  # Acute decompensated liver cirrhosis secondary to ethanol  #Hepatic encephalopathy  #Spontaneous bacterial peritonitis  - hx of HE, recently admitted  - unclear if compliant w/ lactulose at home  -  ammonia elevated on admission  - RUQ ultrasound without actionable findings; hold on therapeutic paracentesis given KEITH and hypotension  - s/p 75g 25% albumin 4/9 and 4/10, will hold on further albumin given concern for ARDS and concern for volume overload   Plan:   - Abx for SBP as above  - Holding lactulose given ileus; miralax BID   - Rifaximin 550mg BID  - Hepatology consult, pt oxygenation too poor at this time for scoping, appreciate recommendations  - 80mg IV PPI in ED; decrease PPI to once daily 4/15  - Nutrition consult, currently on tube feeds   - Dulcolax suppository     #EtOH use disorder  - Per tavo hannah since 3/9  Plan  - High dose thiamine 500mg IV  - Folic acid 1mg daily   -If extubated <72 hrs, recommend CIWA; currently on propofol      RENAL/  #KEITH stage 3  - creatinine 4.8 from baseline ~1 prior to last admission  - Likely multifactorial in setting of septic shock, possible HRS  Plan:   - Nephrology consulted, appreciate recommendations, planning for CVVH given volume overload and acidosis  - CVVH since 4/10  - Tadeo for accurate ins and outs  - Urine lytes indicative of pre-renal etiology   - CT scan without signs of hydronephrosis  - Strict I and o   - PRN ABGs to assess acidosis and hyperK   - Renally dose medications  - Avoid nephrotoxins      #HAGMA   -2/2 Lactic acidosis and renal failure  - management under pulmonary      INFECTIOUS DISEASE  #SBP  #Multifocal pneumonia  - SBP on diagnostic paracentesis  - Pneumonia on CT scan w/ AHRF  - Strep and legionella antigens negative   Plan:   - Vancomycin (4/9-*)  - stop zosyn (4/9-4/10; 4/11-4/12)  - restart meropenem (4/12-*) given Klebsiella on sputum culture and WBC count increase on zosyn; pressor requirement increasing  - Started on micafungin overnight 4/12 for ileus, increased abdominal pressure; continue (4/12-*)  - repeat tobramycin 460mg IV x1 4/12  - Azithromycin 500mg (4/9-4/11)  - MRSA nares positive  - Ascites culture pending    - Respiratory culture growing abundant pseudomonas and klebsiella   - Blood cultures x2      HEME/ONC  #Acute on chronic macrocytic anemia  - Baseline Hb ~11 per [facility] records, 3.2 on recent admission there, 6.3 on admission here  - Family denies hematemesis prior to this admission  Plan:   - s/p 1 unit PRBCs 4/9, 1 unit PRBCs 4/12  - Iron, tibc, ferritin, b12, folate   - Thyroid as below   - Transfuse Hb<7     #Thrombocytopenia  - Likely secondary Cirrhosis, septic shock, DIC  Plan:  - Transfuse Plt <10 or <50 and bleeding  - DIC labs w/ fibrinogen, haptoglobin, LDH, peripheral smear; fibrinogen and haptoglobin low, but factor 8 activity normal/elevated, more consistent w/ sepsis/cirrhosis induced  - S/p 1 unit cryo 4/12     #Coagulopathy   - likely secondary to cirrhosis, septic shock  - DIC labs: low fibrinogen, elevated LDH, low haptoglobin; could be from chronic liver disease and cirrhosis; factor 8 level normal/elevated   Plan:   - vitamin K 10mg IV x3 days (4/9-4/11)  - continue to monitor   -1 unit cryo 4/12      MSK/RHEUM  #R inguinal fold laceration  - Wound consult      ENDOCRINE  #Hypothyroidism  - Unclear if chronic hypothyroidism, no TSH in EMR  - TSH 12.27 on admission, free T4 0.32  - s/p synthroid 200mcg IV in ED and hydrocortisone 100mg IV  Plan:   - Continue stress dose steroids as under CV  - Endocrinology consulted, appreciate recommendations:   - Levothyroxine 88mcg daily   - SSI #2    PSYCH  #MDD  - Holding home escitalopram 5mg daily      F: 50g 25% albumin 4/12  E: PRN  N: Two ysabel; held given high residuals and ileus   A: RIJ CVC (4/9), L radial A line (4/9), 2 20g IV, krishnamurthy (4/9), LIJ trialysis line (4/10)  O2: VC//18/100/10  Abx: Vanc, lucía, micafungin, tobramycin   Gtt: levophed, vasopressin, angiotensin II, epinephrine, fentanyl, propofol     NOK: Alexx Tomlin [phone]; cam[place] Degroot [phone]  Code status: DNR, ok with intubation         Woodrow Cage MD